# Patient Record
Sex: MALE | Race: WHITE | Employment: OTHER | URBAN - METROPOLITAN AREA
[De-identification: names, ages, dates, MRNs, and addresses within clinical notes are randomized per-mention and may not be internally consistent; named-entity substitution may affect disease eponyms.]

---

## 2017-03-09 ENCOUNTER — OFFICE VISIT (OUTPATIENT)
Dept: FAMILY MEDICINE CLINIC | Age: 37
End: 2017-03-09

## 2017-03-09 VITALS
BODY MASS INDEX: 38.98 KG/M2 | SYSTOLIC BLOOD PRESSURE: 108 MMHG | RESPIRATION RATE: 16 BRPM | DIASTOLIC BLOOD PRESSURE: 70 MMHG | WEIGHT: 278.4 LBS | HEIGHT: 71 IN | HEART RATE: 68 BPM | OXYGEN SATURATION: 98 %

## 2017-03-09 DIAGNOSIS — M25.461 KNEE EFFUSION, RIGHT: ICD-10-CM

## 2017-03-09 DIAGNOSIS — M25.561 ACUTE PAIN OF RIGHT KNEE: Primary | ICD-10-CM

## 2017-03-09 DIAGNOSIS — G44.209 TENSION HEADACHE: ICD-10-CM

## 2017-03-09 PROCEDURE — 99213 OFFICE O/P EST LOW 20 MIN: CPT | Performed by: FAMILY MEDICINE

## 2017-03-09 RX ORDER — MELOXICAM 15 MG/1
15 TABLET ORAL DAILY
Qty: 30 TABLET | Refills: 2 | Status: SHIPPED | OUTPATIENT
Start: 2017-03-09 | End: 2017-12-12 | Stop reason: ALTCHOICE

## 2017-03-09 ASSESSMENT — PATIENT HEALTH QUESTIONNAIRE - PHQ9
SUM OF ALL RESPONSES TO PHQ9 QUESTIONS 1 & 2: 0
2. FEELING DOWN, DEPRESSED OR HOPELESS: 0
SUM OF ALL RESPONSES TO PHQ QUESTIONS 1-9: 0
1. LITTLE INTEREST OR PLEASURE IN DOING THINGS: 0

## 2017-03-09 ASSESSMENT — ENCOUNTER SYMPTOMS
GASTROINTESTINAL NEGATIVE: 1
RESPIRATORY NEGATIVE: 1

## 2017-04-26 ENCOUNTER — TELEPHONE (OUTPATIENT)
Dept: CARDIOLOGY | Age: 37
End: 2017-04-26

## 2017-10-26 ENCOUNTER — OFFICE VISIT (OUTPATIENT)
Dept: PULMONOLOGY | Age: 37
End: 2017-10-26
Payer: COMMERCIAL

## 2017-10-26 VITALS
HEART RATE: 89 BPM | OXYGEN SATURATION: 97 % | DIASTOLIC BLOOD PRESSURE: 76 MMHG | BODY MASS INDEX: 38.16 KG/M2 | WEIGHT: 272.6 LBS | SYSTOLIC BLOOD PRESSURE: 112 MMHG | HEIGHT: 71 IN

## 2017-10-26 DIAGNOSIS — G47.33 OBSTRUCTIVE SLEEP APNEA: Primary | ICD-10-CM

## 2017-10-26 PROCEDURE — G8484 FLU IMMUNIZE NO ADMIN: HCPCS | Performed by: PHYSICIAN ASSISTANT

## 2017-10-26 PROCEDURE — 1036F TOBACCO NON-USER: CPT | Performed by: PHYSICIAN ASSISTANT

## 2017-10-26 PROCEDURE — G8417 CALC BMI ABV UP PARAM F/U: HCPCS | Performed by: PHYSICIAN ASSISTANT

## 2017-10-26 PROCEDURE — G8427 DOCREV CUR MEDS BY ELIG CLIN: HCPCS | Performed by: PHYSICIAN ASSISTANT

## 2017-10-26 PROCEDURE — 99213 OFFICE O/P EST LOW 20 MIN: CPT | Performed by: PHYSICIAN ASSISTANT

## 2017-12-12 ENCOUNTER — OFFICE VISIT (OUTPATIENT)
Dept: FAMILY MEDICINE CLINIC | Age: 37
End: 2017-12-12
Payer: COMMERCIAL

## 2017-12-12 VITALS
WEIGHT: 274.8 LBS | HEIGHT: 70 IN | SYSTOLIC BLOOD PRESSURE: 136 MMHG | RESPIRATION RATE: 16 BRPM | DIASTOLIC BLOOD PRESSURE: 88 MMHG | HEART RATE: 76 BPM | BODY MASS INDEX: 39.34 KG/M2

## 2017-12-12 DIAGNOSIS — M25.461 EFFUSION OF RIGHT KNEE: ICD-10-CM

## 2017-12-12 DIAGNOSIS — M23.91 INTERNAL DERANGEMENT OF RIGHT KNEE: ICD-10-CM

## 2017-12-12 DIAGNOSIS — M25.561 ACUTE PAIN OF RIGHT KNEE: Primary | ICD-10-CM

## 2017-12-12 PROCEDURE — G8427 DOCREV CUR MEDS BY ELIG CLIN: HCPCS | Performed by: FAMILY MEDICINE

## 2017-12-12 PROCEDURE — G8417 CALC BMI ABV UP PARAM F/U: HCPCS | Performed by: FAMILY MEDICINE

## 2017-12-12 PROCEDURE — 99213 OFFICE O/P EST LOW 20 MIN: CPT | Performed by: FAMILY MEDICINE

## 2017-12-12 PROCEDURE — 4004F PT TOBACCO SCREEN RCVD TLK: CPT | Performed by: FAMILY MEDICINE

## 2017-12-12 PROCEDURE — G8484 FLU IMMUNIZE NO ADMIN: HCPCS | Performed by: FAMILY MEDICINE

## 2017-12-12 RX ORDER — MELOXICAM 15 MG/1
15 TABLET ORAL DAILY
Qty: 30 TABLET | Refills: 2 | Status: SHIPPED | OUTPATIENT
Start: 2017-12-12 | End: 2018-01-10 | Stop reason: ALTCHOICE

## 2017-12-12 ASSESSMENT — ENCOUNTER SYMPTOMS
GASTROINTESTINAL NEGATIVE: 1
BACK PAIN: 1
RESPIRATORY NEGATIVE: 1

## 2017-12-12 NOTE — PROGRESS NOTES
Visit Information    Have you changed or started any medications since your last visit including any over-the-counter medicines, vitamins, or herbal medicines? no   Are you having any side effects from any of your medications? -  no  Have you stopped taking any of your medications? Is so, why? -  yes - tx complete    Have you seen any other physician or provider since your last visit? Yes, records obtained  Have you had any other diagnostic tests since your last visit? No  Have you been seen in the emergency room and/or had an admission to a hospital since we last saw you? No  Have you had your routine dental cleaning in the past 6 months? no    Have you activated your [employer] account? If not, what are your barriers?  Yes     Patient Care Team:  Héctor Sage DO as PCP - General (Family Medicine)    Medical History Review  Past Medical, Family, and Social History reviewed and does not contribute to the patient presenting condition    Health Maintenance   Topic Date Due    HIV screen  01/08/1994    DTaP/Tdap/Td vaccine (1 - Tdap) 01/08/1998    Flu vaccine (1) 09/01/2017

## 2017-12-12 NOTE — PROGRESS NOTES
Subjective:      Patient ID: Karlee Seo is a 44 y.o. male. HPI:    Chief Complaint   Patient presents with    Knee Pain     right knee pain, got worse the past 2wks     Pt here for follow up right knee pain. Seems to be getting worse over the last 2 weeks. Very swollen. Unable to walk due to the pain and swelling. Unable to flex the knee. Patient Active Problem List   Diagnosis    Tobacco abuse    Dyslipidemia    Nasal obstruction    Deviated nasal septum    Chronic maxillary sinusitis    Hypertrophy of both inferior nasal turbinates    Excessive cerumen in both ear canals    Shoulder pain    Right shoulder tendonitis    Obstructive sleep apnea    Obesity (BMI 30-39. 9)    Restless sleeper    Claustrophobia    Daytime somnolence    Atypical chest pain    Anxiety    IFG (impaired fasting glucose)    Metabolic syndrome     Past Surgical History:   Procedure Laterality Date    APPENDECTOMY  1996    SINUS SURGERY  07/16/2014    RT. MAXILLARY ANTROSTOMY WITH TISSUE REMOVAL SEPTOPLASTY TURBINATE REDUCTION EXCISION AND BIOPSLY LESION MOUTH      Prior to Admission medications    Medication Sig Start Date End Date Taking? Authorizing Provider   CPAP Machine MISC by Does not apply route Please change CPAP pressure to 10 cm H20. 10/26/17  Yes Negin Cr PA-C         Review of Systems   Constitutional: Negative. HENT: Negative. Respiratory: Negative. Cardiovascular: Negative. Gastrointestinal: Negative. Musculoskeletal: Positive for arthralgias (right knee pain), back pain and joint swelling. All other systems reviewed and are negative. Objective:   Physical Exam   Constitutional: He is oriented to person, place, and time. He appears well-developed and well-nourished. HENT:   Head: Normocephalic and atraumatic.    Right Ear: Tympanic membrane normal.   Left Ear: Tympanic membrane normal.   Mouth/Throat: Oropharynx is clear and moist and mucous membranes are normal.   Cardiovascular: Normal rate, regular rhythm and normal heart sounds. No murmur heard. Pulmonary/Chest: Effort normal and breath sounds normal.   Abdominal: Soft. Bowel sounds are normal.   Musculoskeletal: He exhibits no edema. Right knee: He exhibits decreased range of motion, swelling, effusion and abnormal meniscus. Tenderness found. Medial joint line tenderness noted. Neurological: He is alert and oriented to person, place, and time. Skin: Skin is warm and dry. Psychiatric: He has a normal mood and affect. His behavior is normal.   Nursing note and vitals reviewed. Assessment:      1. Acute pain of right knee  MRI LOWER EXTREMITY RIGHT W JT WO CONTRAST   2. Effusion of right knee  MRI LOWER EXTREMITY RIGHT W JT WO CONTRAST    meloxicam (MOBIC) 15 MG tablet   3.  Internal derangement of right knee  MRI LOWER EXTREMITY RIGHT W JT WO CONTRAST           Plan:      -  Continue Mobic  -  Order MRI, internal derangement likely  -  RTO prn for now, further recs after above

## 2017-12-21 ENCOUNTER — HOSPITAL ENCOUNTER (OUTPATIENT)
Dept: MRI IMAGING | Age: 37
Discharge: HOME | End: 2017-12-21
Payer: COMMERCIAL

## 2017-12-21 DIAGNOSIS — M94.261 CHONDROMALACIA, RIGHT KNEE: ICD-10-CM

## 2017-12-21 DIAGNOSIS — M25.561 ACUTE PAIN OF RIGHT KNEE: ICD-10-CM

## 2017-12-21 DIAGNOSIS — M25.561 ACUTE PAIN OF RIGHT KNEE: Primary | ICD-10-CM

## 2017-12-21 DIAGNOSIS — M23.91 INTERNAL DERANGEMENT OF RIGHT KNEE: ICD-10-CM

## 2017-12-21 DIAGNOSIS — M25.461 EFFUSION OF RIGHT KNEE: ICD-10-CM

## 2017-12-21 PROCEDURE — 73721 MRI JNT OF LWR EXTRE W/O DYE: CPT

## 2018-01-04 ENCOUNTER — TELEPHONE (OUTPATIENT)
Dept: FAMILY MEDICINE CLINIC | Age: 38
End: 2018-01-04

## 2018-01-04 NOTE — TELEPHONE ENCOUNTER
Pt was notified, he is getting the testing done tomorrow and appt for his pre op was scheduled next week.

## 2018-01-04 NOTE — TELEPHONE ENCOUNTER
Per pt was sent by Dr. Jose Dolan to [facility] for his knee. They are going to be doing arthroscopic knee surgery on him 1/17/2018. Pt. Is wondering does he have to come back in to get cleared or is the appt from 12/12/2017 okay for him to be cleared.

## 2018-01-10 ENCOUNTER — OFFICE VISIT (OUTPATIENT)
Dept: FAMILY MEDICINE CLINIC | Age: 38
End: 2018-01-10

## 2018-01-10 VITALS
WEIGHT: 276.6 LBS | DIASTOLIC BLOOD PRESSURE: 78 MMHG | SYSTOLIC BLOOD PRESSURE: 126 MMHG | HEART RATE: 76 BPM | HEIGHT: 70 IN | RESPIRATION RATE: 16 BRPM | BODY MASS INDEX: 39.6 KG/M2

## 2018-01-10 DIAGNOSIS — M25.561 RIGHT KNEE PAIN, UNSPECIFIED CHRONICITY: ICD-10-CM

## 2018-01-10 DIAGNOSIS — Z72.0 TOBACCO ABUSE: ICD-10-CM

## 2018-01-10 DIAGNOSIS — E78.5 DYSLIPIDEMIA: ICD-10-CM

## 2018-01-10 DIAGNOSIS — G47.33 OBSTRUCTIVE SLEEP APNEA: ICD-10-CM

## 2018-01-10 DIAGNOSIS — E66.9 OBESITY (BMI 30-39.9): ICD-10-CM

## 2018-01-10 DIAGNOSIS — M25.461 EFFUSION OF KNEE JOINT RIGHT: ICD-10-CM

## 2018-01-10 DIAGNOSIS — R73.01 IFG (IMPAIRED FASTING GLUCOSE): ICD-10-CM

## 2018-01-10 DIAGNOSIS — Z01.818 PRE-OP EVALUATION: Primary | ICD-10-CM

## 2018-01-10 PROCEDURE — PREOPEXAM PRE-OP EXAM: Performed by: FAMILY MEDICINE

## 2018-01-10 ASSESSMENT — ENCOUNTER SYMPTOMS
RESPIRATORY NEGATIVE: 1
GASTROINTESTINAL NEGATIVE: 1

## 2018-01-10 NOTE — PROGRESS NOTES
Visit Information    Have you changed or started any medications since your last visit including any over-the-counter medicines, vitamins, or herbal medicines? no   Are you having any side effects from any of your medications? -  no  Have you stopped taking any of your medications? Is so, why? -  no    Have you seen any other physician or provider since your last visit? Yes - Records Requested Dr Estelle Chapa  Have you had any other diagnostic tests since your last visit? Yes - Records Requested  Have you been seen in the emergency room and/or had an admission to a hospital since we last saw you? No  Have you had your routine dental cleaning in the past 6 months? no    Have you activated your [employer] account? If not, what are your barriers?  Yes     Patient Care Team:  Madisyn Bahena DO as PCP - General (Family Medicine)  Lorenza Angelucci, MD as Consulting Physician (Orthopedic Surgery)    Medical History Review  Past Medical, Family, and Social History reviewed and does contribute to the patient presenting condition    Health Maintenance   Topic Date Due    HIV screen  01/08/1994    DTaP/Tdap/Td vaccine (1 - Tdap) 01/08/1998    Pneumococcal med risk (1 of 1 - PPSV23) 01/08/1998    A1C test (Diabetic or Prediabetic)  09/28/2017    Flu vaccine (1) 09/01/2018 (Originally 9/1/2017)

## 2018-04-22 ENCOUNTER — HOSPITAL ENCOUNTER (EMERGENCY)
Age: 38
Discharge: HOME | End: 2018-04-23
Payer: COMMERCIAL

## 2018-04-22 DIAGNOSIS — S05.02XA ABRASION OF LEFT CORNEA, INITIAL ENCOUNTER: Primary | ICD-10-CM

## 2018-04-22 PROCEDURE — 99283 EMERGENCY DEPT VISIT LOW MDM: CPT

## 2018-04-22 RX ORDER — PROPARACAINE HYDROCHLORIDE 5 MG/ML
2 SOLUTION/ DROPS OPHTHALMIC ONCE
Status: COMPLETED | OUTPATIENT
Start: 2018-04-23 | End: 2018-04-23

## 2018-04-22 ASSESSMENT — VISUAL ACUITY
OU: 20 25
OD: 20 40
OS: 20 30

## 2018-04-22 ASSESSMENT — PAIN DESCRIPTION - ORIENTATION: ORIENTATION: LEFT

## 2018-04-22 ASSESSMENT — PAIN SCALES - GENERAL: PAINLEVEL_OUTOF10: 9

## 2018-04-22 ASSESSMENT — PAIN DESCRIPTION - LOCATION: LOCATION: EYE

## 2018-04-22 ASSESSMENT — PAIN DESCRIPTION - DESCRIPTORS: DESCRIPTORS: ACHING

## 2018-04-23 VITALS
OXYGEN SATURATION: 97 % | RESPIRATION RATE: 18 BRPM | HEART RATE: 71 BPM | DIASTOLIC BLOOD PRESSURE: 78 MMHG | TEMPERATURE: 97.8 F | SYSTOLIC BLOOD PRESSURE: 146 MMHG

## 2018-04-23 PROCEDURE — 6370000000 HC RX 637 (ALT 250 FOR IP): Performed by: NURSE PRACTITIONER

## 2018-04-23 RX ORDER — POLYMYXIN B SULFATE AND TRIMETHOPRIM 1; 10000 MG/ML; [USP'U]/ML
2 SOLUTION OPHTHALMIC ONCE
Status: COMPLETED | OUTPATIENT
Start: 2018-04-23 | End: 2018-04-23

## 2018-04-23 RX ADMIN — POLYMYXIN B SULFATE AND TRIMETHOPRIM SULFATE 2 DROP: 10000; 1 SOLUTION/ DROPS OPHTHALMIC at 01:59

## 2018-04-23 RX ADMIN — PROPARACAINE HYDROCHLORIDE 2 DROP: 5 SOLUTION/ DROPS OPHTHALMIC at 01:00

## 2018-04-23 ASSESSMENT — ENCOUNTER SYMPTOMS
ABDOMINAL PAIN: 0
EYE REDNESS: 0
DIARRHEA: 0
BACK PAIN: 0
NAUSEA: 0
VOMITING: 0
COUGH: 0
WHEEZING: 0
SORE THROAT: 0
SHORTNESS OF BREATH: 0
EYE PAIN: 1
EYE DISCHARGE: 0
RHINORRHEA: 0

## 2018-07-15 ENCOUNTER — HOSPITAL ENCOUNTER (EMERGENCY)
Age: 38
Discharge: HOME | End: 2018-07-15
Payer: COMMERCIAL

## 2018-07-15 VITALS
WEIGHT: 277 LBS | HEART RATE: 100 BPM | SYSTOLIC BLOOD PRESSURE: 124 MMHG | DIASTOLIC BLOOD PRESSURE: 78 MMHG | OXYGEN SATURATION: 96 % | HEIGHT: 70 IN | BODY MASS INDEX: 39.65 KG/M2 | RESPIRATION RATE: 16 BRPM | TEMPERATURE: 99.2 F

## 2018-07-15 DIAGNOSIS — H60.392 INFECTIVE OTITIS EXTERNA OF LEFT EAR: Primary | ICD-10-CM

## 2018-07-15 DIAGNOSIS — S53.402A ELBOW SPRAIN, LEFT, INITIAL ENCOUNTER: ICD-10-CM

## 2018-07-15 DIAGNOSIS — S30.0XXA CONTUSION OF BUTTOCK, INITIAL ENCOUNTER: ICD-10-CM

## 2018-07-15 PROCEDURE — 99212 OFFICE O/P EST SF 10 MIN: CPT

## 2018-07-15 PROCEDURE — 99213 OFFICE O/P EST LOW 20 MIN: CPT | Performed by: NURSE PRACTITIONER

## 2018-07-15 PROCEDURE — 6360000002 HC RX W HCPCS: Performed by: NURSE PRACTITIONER

## 2018-07-15 PROCEDURE — 96372 THER/PROPH/DIAG INJ SC/IM: CPT

## 2018-07-15 RX ORDER — OFLOXACIN 3 MG/ML
5 SOLUTION AURICULAR (OTIC) 2 TIMES DAILY
Qty: 10 ML | Refills: 0 | Status: SHIPPED | OUTPATIENT
Start: 2018-07-15 | End: 2018-07-16 | Stop reason: ALTCHOICE

## 2018-07-15 RX ORDER — OFLOXACIN 3 MG/ML
5 SOLUTION AURICULAR (OTIC) 2 TIMES DAILY
Qty: 10 ML | Refills: 0 | Status: SHIPPED | OUTPATIENT
Start: 2018-07-15 | End: 2018-07-15

## 2018-07-15 RX ORDER — METHYLPREDNISOLONE ACETATE 80 MG/ML
80 INJECTION, SUSPENSION INTRA-ARTICULAR; INTRALESIONAL; INTRAMUSCULAR; SOFT TISSUE ONCE
Status: COMPLETED | OUTPATIENT
Start: 2018-07-15 | End: 2018-07-15

## 2018-07-15 RX ORDER — IBUPROFEN 800 MG/1
800 TABLET ORAL EVERY 6 HOURS PRN
COMMUNITY
End: 2018-10-31 | Stop reason: ALTCHOICE

## 2018-07-15 RX ADMIN — METHYLPREDNISOLONE ACETATE 80 MG: 80 INJECTION, SUSPENSION INTRA-ARTICULAR; INTRALESIONAL; INTRAMUSCULAR; SOFT TISSUE at 16:55

## 2018-07-15 ASSESSMENT — ENCOUNTER SYMPTOMS
SORE THROAT: 0
NAUSEA: 0
VOMITING: 0
SHORTNESS OF BREATH: 0
DIARRHEA: 0
COUGH: 0

## 2018-07-15 ASSESSMENT — PAIN DESCRIPTION - DESCRIPTORS: DESCRIPTORS: DISCOMFORT;ACHING

## 2018-07-15 ASSESSMENT — PAIN DESCRIPTION - PAIN TYPE: TYPE: ACUTE PAIN

## 2018-07-15 ASSESSMENT — PAIN SCALES - GENERAL: PAINLEVEL_OUTOF10: 8

## 2018-07-15 ASSESSMENT — PAIN DESCRIPTION - FREQUENCY: FREQUENCY: CONTINUOUS

## 2018-07-15 ASSESSMENT — PAIN DESCRIPTION - ORIENTATION: ORIENTATION: LEFT

## 2018-07-15 NOTE — ED PROVIDER NOTES
[id number]  Urgent Care Encounter       CHIEF COMPLAINT       Chief Complaint   Patient presents with   James Botello     left ear    Elbow Pain     left from a fall    Buttocks Pain     left from a fall       Nurses Notes reviewed and I agree except as noted in the HPI. HISTORY OF PRESENT ILLNESS   Arabella Hoang is a 43 y.o. male who presents With complaints of left ear pain. Also complains of left elbow and left buttock pain from a fall over the weekend. Patient reports intermittent left ear pain for the past 2 weeks however, pain increased yesterday. He also reports not being able to hear out of the left ear as well. He does report temperature of 102°F today. He did take some Tylenol and the fever was down to 99.7°F at this visit. No reports of nausea, vomiting, diarrhea. Left elbow and left buttock pain are result of a fall while hunting over the weekend. He slipped while walking down a steep incline and braced himself with his left arm and also landed on a rock with his left buttock. The ports a large bruise to the left buttock. States he elbow is feeling better. The history is provided by the patient. No  was used. REVIEW OF SYSTEMS     Review of Systems   Constitutional: Positive for fever. Negative for appetite change, chills and fatigue. HENT: Positive for ear pain (Left). Negative for congestion and sore throat. Respiratory: Negative for cough and shortness of breath. Cardiovascular: Negative for chest pain. Gastrointestinal: Negative for diarrhea, nausea and vomiting. Musculoskeletal:        See HPI   Neurological: Negative for dizziness and headaches.        PAST MEDICAL HISTORY         Diagnosis Date    Anxiety     Asthma     as child    Depression     Hyperlipidemia     Metabolic syndrome 62/48/4854    Obesity     Sleep apnea     Has CPAP       SURGICAL HISTORY     Patient  has a past surgical history that includes Appendectomy (1996) and sinus surgery (07/16/2014). CURRENT MEDICATIONS       Discharge Medication List as of 7/15/2018  4:53 PM      CONTINUE these medications which have NOT CHANGED    Details   Pseudoephedrine-APAP-DM (DAYQUIL PO) Take by mouthHistorical Med      ibuprofen (ADVIL;MOTRIN) 800 MG tablet Take 800 mg by mouth every 6 hours as needed for PainHistorical Med      CPAP Machine MISC Starting Thu 10/26/2017, Disp-1 each, R-0, PrintPlease change CPAP pressure to 10 cm H20. ALLERGIES     Patient is has No Known Allergies. Patients   There is no immunization history on file for this patient. FAMILY HISTORY     Patient's family history includes Arthritis in his maternal grandfather, maternal grandmother, paternal grandfather, and paternal grandmother; Cancer in his father; Diabetes in his maternal grandfather, maternal grandmother, paternal grandfather, and paternal grandmother; Heart Disease in his maternal grandfather; Kidney Disease in his maternal grandfather; Stroke in his paternal grandmother. SOCIAL HISTORY     Patient  reports that he has been smoking Cigarettes. He started smoking about 22 years ago. He has a 20.00 pack-year smoking history. He has never used smokeless tobacco. He reports that he does not drink alcohol or use drugs. PHYSICAL EXAM     ED TRIAGE VITALS  BP: 124/78, Temp: 99.2 °F (37.3 °C), Pulse: 100, Resp: 16, SpO2: 96 %,Estimated body mass index is 39.75 kg/m² as calculated from the following:    Height as of this encounter: 5' 10\" (1.778 m). Weight as of this encounter: 277 lb (125.6 kg). ,No LMP for male patient. Physical Exam   Constitutional: He is oriented to person, place, and time. He appears well-developed and well-nourished. He is cooperative. He does not appear ill. He appears distressed (Mild). HENT:   Head: Normocephalic and atraumatic.    Right Ear: Hearing, tympanic membrane, external ear and ear canal normal.   Left Ear: Decreased hearing is noted. Left ear canal is almost completely swollen shut. Pain with ear movement tragus movement. Pulmonary/Chest: Effort normal. No respiratory distress. Musculoskeletal:        Legs:  Neurological: He is alert and oriented to person, place, and time. Skin: Skin is warm and dry. Psychiatric: He has a normal mood and affect. His speech is normal and behavior is normal. Thought content normal.   Nursing note and vitals reviewed. DIAGNOSTIC RESULTS   Labs:No results found for this visit on 07/15/18. IMAGING:    No orders to display         URGENT CARE COURSE:     Vitals:    07/15/18 1624 07/15/18 1712   BP: (!) 148/93 124/78   Pulse: 104 100   Resp: 18 16   Temp: 99.7 °F (37.6 °C) 99.2 °F (37.3 °C)   TempSrc: Oral Oral   SpO2: 96%    Weight: 277 lb (125.6 kg)    Height: 5' 10\" (1.778 m)        Medications   methylPREDNISolone acetate (DEPO-MEDROL) injection 80 mg (80 mg Intramuscular Given 7/15/18 1655)            PROCEDURES:  None    FINAL IMPRESSION      1. Infective otitis externa of left ear    2. Elbow sprain, left, initial encounter    3. Contusion of buttock, initial encounter          DISPOSITION/PLAN   DISPOSITION Decision To Discharge 07/15/2018 04:46:47 PM  Use eardrops as prescribed for the full 7 days.  Do not stop using even if symptoms have resolved. Keep ears clean and dry. No swimming or submerging head underwater for 7 days. If going to be around water, use the pliable earplugs. Follow-up with her family doctor as needed if symptoms do not resolve. Go to the emergency room for any concerns, fever greater than 102°F.  Patient with left otitis externa. He was given 80 mg of Depo-Medrol IM while in the urgent care center due to the edema of the ear canal.  He was prescribed ofloxacin drops to use ×7 days. Not concerned with the elbow and buttock injury. He can use ice to the buttock as desired. Left elbow with no swelling or decreased range of motion.     PATIENT

## 2018-07-16 ENCOUNTER — HOSPITAL ENCOUNTER (EMERGENCY)
Age: 38
Discharge: HOME | End: 2018-07-16
Payer: COMMERCIAL

## 2018-07-16 ENCOUNTER — OFFICE VISIT (OUTPATIENT)
Dept: ENT CLINIC | Age: 38
End: 2018-07-16
Payer: COMMERCIAL

## 2018-07-16 ENCOUNTER — APPOINTMENT (OUTPATIENT)
Dept: CT IMAGING | Age: 38
End: 2018-07-16
Payer: COMMERCIAL

## 2018-07-16 VITALS
WEIGHT: 277 LBS | HEIGHT: 70 IN | BODY MASS INDEX: 39.65 KG/M2 | SYSTOLIC BLOOD PRESSURE: 136 MMHG | RESPIRATION RATE: 18 BRPM | DIASTOLIC BLOOD PRESSURE: 92 MMHG | OXYGEN SATURATION: 96 % | TEMPERATURE: 98 F | HEART RATE: 85 BPM

## 2018-07-16 VITALS
WEIGHT: 279.9 LBS | TEMPERATURE: 98.2 F | SYSTOLIC BLOOD PRESSURE: 130 MMHG | BODY MASS INDEX: 40.07 KG/M2 | DIASTOLIC BLOOD PRESSURE: 84 MMHG | HEIGHT: 70 IN | HEART RATE: 96 BPM | RESPIRATION RATE: 16 BRPM

## 2018-07-16 DIAGNOSIS — H60.312 ACUTE DIFFUSE OTITIS EXTERNA OF LEFT EAR: Primary | ICD-10-CM

## 2018-07-16 DIAGNOSIS — H60.392 INFECTIVE OTITIS EXTERNA OF LEFT EAR: Primary | ICD-10-CM

## 2018-07-16 LAB
ANION GAP SERPL CALCULATED.3IONS-SCNC: 14 MEQ/L (ref 8–16)
BASOPHILS # BLD: 0.4 %
BASOPHILS ABSOLUTE: 0.1 THOU/MM3 (ref 0–0.1)
BUN BLDV-MCNC: 9 MG/DL (ref 7–22)
CALCIUM SERPL-MCNC: 9.4 MG/DL (ref 8.5–10.5)
CHLORIDE BLD-SCNC: 103 MEQ/L (ref 98–111)
CO2: 23 MEQ/L (ref 23–33)
CREAT SERPL-MCNC: 0.7 MG/DL (ref 0.4–1.2)
EOSINOPHIL # BLD: 1.3 %
EOSINOPHILS ABSOLUTE: 0.2 THOU/MM3 (ref 0–0.4)
ERYTHROCYTE [DISTWIDTH] IN BLOOD BY AUTOMATED COUNT: 13.7 % (ref 11.5–14.5)
ERYTHROCYTE [DISTWIDTH] IN BLOOD BY AUTOMATED COUNT: 46.5 FL (ref 35–45)
GFR SERPL CREATININE-BSD FRML MDRD: > 90 ML/MIN/1.73M2
GLUCOSE BLD-MCNC: 115 MG/DL (ref 70–108)
HCT VFR BLD CALC: 44 % (ref 42–52)
HEMOGLOBIN: 15.2 GM/DL (ref 14–18)
IMMATURE GRANS (ABS): 0.1 THOU/MM3 (ref 0–0.07)
IMMATURE GRANULOCYTES: 0.7 %
LYMPHOCYTES # BLD: 12.8 %
LYMPHOCYTES ABSOLUTE: 1.8 THOU/MM3 (ref 1–4.8)
MCH RBC QN AUTO: 31.9 PG (ref 26–33)
MCHC RBC AUTO-ENTMCNC: 34.5 GM/DL (ref 32.2–35.5)
MCV RBC AUTO: 92.4 FL (ref 80–94)
MONOCYTES # BLD: 9.9 %
MONOCYTES ABSOLUTE: 1.4 THOU/MM3 (ref 0.4–1.3)
NUCLEATED RED BLOOD CELLS: 0 /100 WBC
OSMOLALITY CALCULATION: 279 MOSMOL/KG (ref 275–300)
PLATELET # BLD: 226 THOU/MM3 (ref 130–400)
PMV BLD AUTO: 9.6 FL (ref 9.4–12.4)
POTASSIUM SERPL-SCNC: 4.1 MEQ/L (ref 3.5–5.2)
RBC # BLD: 4.76 MILL/MM3 (ref 4.7–6.1)
SEG NEUTROPHILS: 74.9 %
SEGMENTED NEUTROPHILS ABSOLUTE COUNT: 10.3 THOU/MM3 (ref 1.8–7.7)
SODIUM BLD-SCNC: 140 MEQ/L (ref 135–145)
WBC # BLD: 13.7 THOU/MM3 (ref 4.8–10.8)

## 2018-07-16 PROCEDURE — 87075 CULTR BACTERIA EXCEPT BLOOD: CPT

## 2018-07-16 PROCEDURE — 87205 SMEAR GRAM STAIN: CPT

## 2018-07-16 PROCEDURE — 4004F PT TOBACCO SCREEN RCVD TLK: CPT | Performed by: OTOLARYNGOLOGY

## 2018-07-16 PROCEDURE — G8427 DOCREV CUR MEDS BY ELIG CLIN: HCPCS | Performed by: OTOLARYNGOLOGY

## 2018-07-16 PROCEDURE — 6370000000 HC RX 637 (ALT 250 FOR IP): Performed by: EMERGENCY MEDICINE

## 2018-07-16 PROCEDURE — 87186 SC STD MICRODIL/AGAR DIL: CPT

## 2018-07-16 PROCEDURE — 36415 COLL VENOUS BLD VENIPUNCTURE: CPT

## 2018-07-16 PROCEDURE — 4130F TOPICAL PREP RX AOE: CPT | Performed by: OTOLARYNGOLOGY

## 2018-07-16 PROCEDURE — 80048 BASIC METABOLIC PNL TOTAL CA: CPT

## 2018-07-16 PROCEDURE — G8417 CALC BMI ABV UP PARAM F/U: HCPCS | Performed by: OTOLARYNGOLOGY

## 2018-07-16 PROCEDURE — 99203 OFFICE O/P NEW LOW 30 MIN: CPT | Performed by: OTOLARYNGOLOGY

## 2018-07-16 PROCEDURE — 85025 COMPLETE CBC W/AUTO DIFF WBC: CPT

## 2018-07-16 PROCEDURE — 87070 CULTURE OTHR SPECIMN AEROBIC: CPT

## 2018-07-16 PROCEDURE — 70480 CT ORBIT/EAR/FOSSA W/O DYE: CPT

## 2018-07-16 PROCEDURE — 99283 EMERGENCY DEPT VISIT LOW MDM: CPT

## 2018-07-16 RX ORDER — HYDROCODONE BITARTRATE AND ACETAMINOPHEN 5; 325 MG/1; MG/1
1 TABLET ORAL ONCE
Status: COMPLETED | OUTPATIENT
Start: 2018-07-16 | End: 2018-07-16

## 2018-07-16 RX ORDER — DEXAMETHASONE SODIUM PHOSPHATE 1 MG/ML
2 SOLUTION/ DROPS OPHTHALMIC ONCE
Status: DISCONTINUED | OUTPATIENT
Start: 2018-07-16 | End: 2018-07-16 | Stop reason: HOSPADM

## 2018-07-16 RX ORDER — CIPROFLOXACIN HYDROCHLORIDE 3.5 MG/ML
2 SOLUTION/ DROPS TOPICAL ONCE
Status: COMPLETED | OUTPATIENT
Start: 2018-07-16 | End: 2018-07-16

## 2018-07-16 RX ORDER — CIPROFLOXACIN AND DEXAMETHASONE 3; 1 MG/ML; MG/ML
4 SUSPENSION/ DROPS AURICULAR (OTIC) 2 TIMES DAILY
Qty: 1 BOTTLE | Refills: 0 | Status: SHIPPED | OUTPATIENT
Start: 2018-07-16 | End: 2018-07-23

## 2018-07-16 RX ORDER — NAPROXEN 500 MG/1
500 TABLET ORAL 2 TIMES DAILY
Qty: 60 TABLET | Refills: 0 | Status: SHIPPED | OUTPATIENT
Start: 2018-07-16 | End: 2018-10-31 | Stop reason: ALTCHOICE

## 2018-07-16 RX ORDER — TRAMADOL HYDROCHLORIDE 50 MG/1
50 TABLET ORAL EVERY 6 HOURS PRN
Qty: 12 TABLET | Refills: 0 | Status: SHIPPED | OUTPATIENT
Start: 2018-07-16 | End: 2018-07-19

## 2018-07-16 RX ADMIN — CIPROFLOXACIN HYDROCHLORIDE 2 DROP: 3 SOLUTION/ DROPS OPHTHALMIC at 09:00

## 2018-07-16 RX ADMIN — HYDROCODONE BITARTRATE AND ACETAMINOPHEN 1 TABLET: 5; 325 TABLET ORAL at 07:11

## 2018-07-16 ASSESSMENT — ENCOUNTER SYMPTOMS
WHEEZING: 0
NAUSEA: 0
COLOR CHANGE: 0
TROUBLE SWALLOWING: 0
TROUBLE SWALLOWING: 0
VOMITING: 0
VOICE CHANGE: 0
FACIAL SWELLING: 1
CHEST TIGHTNESS: 0
SHORTNESS OF BREATH: 0
ABDOMINAL PAIN: 0
BLOOD IN STOOL: 0
SINUS PAIN: 0
CONSTIPATION: 0
CHOKING: 0
SORE THROAT: 0
EYE REDNESS: 0
STRIDOR: 0
COLOR CHANGE: 0
RHINORRHEA: 0
EYE DISCHARGE: 0
FACIAL SWELLING: 1
EYE ITCHING: 0
SHORTNESS OF BREATH: 0
COUGH: 0
SORE THROAT: 0
SINUS PRESSURE: 0
ANAL BLEEDING: 0
ABDOMINAL DISTENTION: 0
PHOTOPHOBIA: 0
BACK PAIN: 0
VOICE CHANGE: 0
DIARRHEA: 0
SINUS PRESSURE: 0
COUGH: 0
EYE PAIN: 0
APNEA: 0
RECTAL PAIN: 0
ABDOMINAL PAIN: 0

## 2018-07-16 ASSESSMENT — PAIN DESCRIPTION - FREQUENCY: FREQUENCY: CONTINUOUS

## 2018-07-16 ASSESSMENT — PAIN DESCRIPTION - PAIN TYPE: TYPE: ACUTE PAIN

## 2018-07-16 ASSESSMENT — PAIN DESCRIPTION - PROGRESSION: CLINICAL_PROGRESSION: GRADUALLY WORSENING

## 2018-07-16 ASSESSMENT — PAIN DESCRIPTION - ONSET: ONSET: ON-GOING

## 2018-07-16 ASSESSMENT — PAIN SCALES - GENERAL
PAINLEVEL_OUTOF10: 8
PAINLEVEL_OUTOF10: 6
PAINLEVEL_OUTOF10: 10

## 2018-07-16 ASSESSMENT — PAIN DESCRIPTION - LOCATION: LOCATION: EAR

## 2018-07-16 ASSESSMENT — PAIN DESCRIPTION - ORIENTATION: ORIENTATION: LEFT

## 2018-07-16 NOTE — PROGRESS NOTES
Subjective:      Patient ID: Concetta Vitale is a 43 y.o. male. Patient is here for otitis externa, er per Crystal    HPI: Acute onset ear pain left SAturday. Seen in Urgent Care, placed on ear dops and a shot of steroids. Said drops were not going in. Pain worse. Went to ER. Had cultures done and they put in an ear wick. Two weeks ago, left ear was itchy. He stuck a Qtip in the ear and got blood on the QTip. Review of Systems   Constitutional: Negative for activity change, appetite change, chills, diaphoresis, fatigue, fever and unexpected weight change. HENT: Positive for ear pain, facial swelling and hearing loss. Negative for congestion, dental problem, drooling, ear discharge, mouth sores, nosebleeds, postnasal drip, rhinorrhea, sinus pressure, sneezing, sore throat, tinnitus, trouble swallowing and voice change. Eyes: Negative for photophobia, pain, discharge, redness, itching and visual disturbance. Respiratory: Negative for apnea, cough, choking, chest tightness, shortness of breath, wheezing and stridor. Cardiovascular: Negative for chest pain, palpitations and leg swelling. Gastrointestinal: Negative for abdominal distention, abdominal pain, anal bleeding, blood in stool, constipation, diarrhea, nausea, rectal pain and vomiting. Endocrine: Negative for cold intolerance, heat intolerance, polydipsia, polyphagia and polyuria. Genitourinary: Negative for decreased urine volume, difficulty urinating, discharge, dysuria, enuresis, flank pain, frequency, genital sores, hematuria, penile pain, penile swelling, scrotal swelling, testicular pain and urgency. Musculoskeletal: Negative for arthralgias, back pain, gait problem, joint swelling, myalgias, neck pain and neck stiffness. Skin: Negative for color change, pallor, rash and wound. Allergic/Immunologic: Negative for environmental allergies, food allergies and immunocompromised state.    Neurological: Negative for dizziness, tremors, seizures, syncope, speech difficulty, weakness, light-headedness, numbness and headaches. Hematological: Negative for adenopathy. Does not bruise/bleed easily. Psychiatric/Behavioral: Negative for agitation, behavioral problems, confusion, decreased concentration, dysphoric mood, hallucinations, self-injury, sleep disturbance and suicidal ideas. The patient is not nervous/anxious and is not hyperactive. Patient Active Problem List   Diagnosis    Tobacco abuse    Dyslipidemia    Nasal obstruction    Deviated nasal septum    Chronic maxillary sinusitis    Hypertrophy of both inferior nasal turbinates    Excessive cerumen in both ear canals    Shoulder pain    Right shoulder tendonitis    Obstructive sleep apnea    Obesity (BMI 30-39. 9)    Restless sleeper    Claustrophobia    Daytime somnolence    Atypical chest pain    Anxiety    IFG (impaired fasting glucose)    Metabolic syndrome       Past Medical History:   Diagnosis Date    Anxiety     Asthma     as child    Depression     Hyperlipidemia     Metabolic syndrome 70/50/7426    Obesity     Sleep apnea     Has CPAP        Past Surgical History:   Procedure Laterality Date    APPENDECTOMY  1996    KNEE ARTHROSCOPY  01/2018    SINUS SURGERY  07/16/2014    RT. MAXILLARY ANTROSTOMY WITH TISSUE REMOVAL SEPTOPLASTY TURBINATE REDUCTION EXCISION AND BIOPSLY LESION MOUTH        Current Outpatient Prescriptions   Medication Sig Dispense Refill    traMADol (ULTRAM) 50 MG tablet Take 1 tablet by mouth every 6 hours as needed for Pain for up to 3 days. Intended supply: 3 days. Take lowest dose possible to manage pain.  12 tablet 0    naproxen (NAPROSYN) 500 MG tablet Take 1 tablet by mouth 2 times daily 60 tablet 0    ciprofloxacin-dexamethasone (CIPRODEX) 0.3-0.1 % otic suspension Place 4 drops into the left ear 2 times daily for 7 days 1 Bottle 0    Pseudoephedrine-APAP-DM (DAYQUIL PO) Take by mouth as needed       ibuprofen

## 2018-07-16 NOTE — ED PROVIDER NOTES
Depression; Hyperlipidemia; Metabolic syndrome; Obesity; and Sleep apnea. SURGICAL HISTORY      has a past surgical history that includes Appendectomy () and sinus surgery (2014). CURRENT MEDICATIONS       Discharge Medication List as of 2018  8:54 AM      CONTINUE these medications which have NOT CHANGED    Details   Pseudoephedrine-APAP-DM (DAYQUIL PO) Take by mouthHistorical Med      ibuprofen (ADVIL;MOTRIN) 800 MG tablet Take 800 mg by mouth every 6 hours as needed for PainHistorical Med      CPAP Machine MISC Starting Thu 10/26/2017, Disp-1 each, R-0, PrintPlease change CPAP pressure to 10 cm H20. ALLERGIES     has No Known Allergies. FAMILY HISTORY     indicated that his mother is alive. He indicated that his father is . He indicated that his maternal grandmother is . He indicated that his maternal grandfather is . He indicated that his paternal grandmother is . He indicated that his paternal grandfather is . family history includes Arthritis in his maternal grandfather, maternal grandmother, paternal grandfather, and paternal grandmother; Cancer in his father; Diabetes in his maternal grandfather, maternal grandmother, paternal grandfather, and paternal grandmother; Heart Disease in his maternal grandfather; Kidney Disease in his maternal grandfather; Stroke in his paternal grandmother. SOCIAL HISTORY      reports that he has been smoking Cigarettes. He started smoking about 22 years ago. He has a 20.00 pack-year smoking history. He has never used smokeless tobacco. He reports that he does not drink alcohol or use drugs. PHYSICAL EXAM     INITIAL VITALS:  height is 5' 10\" (1.778 m) and weight is 277 lb (125.6 kg). His oral temperature is 98 °F (36.7 °C). His blood pressure is 136/92 (abnormal) and his pulse is 85. His respiration is 18 and oxygen saturation is 96%.     Physical Exam   Constitutional: He is oriented to person, electronically signed by Dr. Anusha Duvall on 7/16/2018 8:05 AM          LABS:     Labs Reviewed   CBC WITH AUTO DIFFERENTIAL - Abnormal; Notable for the following:        Result Value    WBC 13.7 (*)     RDW-SD 46.5 (*)     Segs Absolute 10.3 (*)     Monocytes # 1.4 (*)     Immature Grans (Abs) 0.10 (*)     All other components within normal limits   BASIC METABOLIC PANEL - Abnormal; Notable for the following:     Glucose 115 (*)     All other components within normal limits   ANAEROBIC AND AEROBIC CULTURE   ANION GAP   OSMOLALITY   GLOMERULAR FILTRATION RATE, ESTIMATED       EMERGENCY DEPARTMENT COURSE:   Vitals:    Vitals:    07/16/18 0617 07/16/18 0715   BP: (!) 144/97 (!) 136/92   Pulse: 87 85   Resp: 21 18   Temp: 98 °F (36.7 °C)    TempSrc: Oral    SpO2: 97% 96%   Weight: 277 lb (125.6 kg)    Height: 5' 10\" (1.778 m)        0650 AM: The patient was seen and evaluated. Per. Labs and imaging were ordered. The patient was treated for pain with Norco while in the department. Labs were reviewed. The patient does have leukocytosis with white blood cell count 13.7. Hemoglobin 15.2 hematocrit 44%. Blood cultures essentially normal.  Aerobic and anaerobic culture was collected of the left ear canal.  CT scan of the sella turcica shows that the mastoid air cells have to see small partial opacification, normally aerated, no bony destructive process. Is no abscess or cellulitis noted over the temporal mandibular joint. MDM:  I contacted Latasha Wynn NP ear nose and throat. She was able to review the labs and images of the CT scan. This is thought to be a severe otitis externa. She advised that she can see the patient in the office today at 11:30 to clean out the ear canal, evaluate and place airway and antibiotics. Until being evaluated, I will place an ear wick in the left ear canal and apply Cipro eardrops. Dexamethasone eardrops are not available at [facility]. Ce[facility] at this moment.   The patient

## 2018-07-16 NOTE — ED NOTES
Linda Andrea, NP at bedside to updated on results and plan of care.       Andrea Hanks, RN  07/16/18 6100

## 2018-07-16 NOTE — ED NOTES
Anaerobic and aerobic cultures obtained per Brooks Rizvi NP and sent to lab. Pt tolerated well. Rprts decreased pain following med administration and prior to cultures obtained. Lights dimmed. Call light within reach. Will continue to monitor for safety and comfort.       Andrea Hanks RN  07/16/18 8088

## 2018-07-16 NOTE — ED TRIAGE NOTES
Patient arrived to ED with c/o left ear pain that has been ongoing since Friday without improvement. Patient went to urgent care yesterday and was started on Ofloxacin ear drops and was given a steroid injection. Patient reports that pain has not improved and he feels as if it is getting worse. Denies dizziness or other symptoms. States that he does feel some jaw pain and toothache with it. The patient reports that he did fall on the left side and later after that he noticed the pain has gotten worse.

## 2018-07-17 ENCOUNTER — TELEPHONE (OUTPATIENT)
Dept: ENT CLINIC | Age: 38
End: 2018-07-17

## 2018-07-17 NOTE — TELEPHONE ENCOUNTER
Yes the otowick is working properly- it will turn a discolored, and it will take some time to absorb on the wick. The swelling will take several days of drops to help resolve. He can shower. Keep water out of the ear (can use cotton in outer ear).

## 2018-07-17 NOTE — TELEPHONE ENCOUNTER
Called patient and informed him that the otowick is working properly. Informed that it will turn discolored, and it will take some time to absorb on the wick. Informed the swelling will take several days of drops of drop to help resolve. informed patient he can shower. Keep ear dry can use cotton in the outer ear. He verbalized understanding.

## 2018-07-17 NOTE — TELEPHONE ENCOUNTER
Patients daughter in law called in and states she has a couple of questions, patient had otowick placed yesterday in the er and seen Dr Cheryl Corona in the office and his ear is swollen still. Checked patients HIPAA and daughter in law is not on HIPAA, spoke with patient and he states it is fine to talk to her, daughter in law states when they place the drops in his ear she can see it otowick and it has turned a yellowish/orangish color and also states the ear drops just sit there for awhile before being absorbed. Patient daughter in law just want to make sure that is what it is suppose to do, also she wants to know if patient can take a shower. Please advise.

## 2018-07-20 ENCOUNTER — OFFICE VISIT (OUTPATIENT)
Dept: ENT CLINIC | Age: 38
End: 2018-07-20
Payer: COMMERCIAL

## 2018-07-20 VITALS
TEMPERATURE: 98.3 F | SYSTOLIC BLOOD PRESSURE: 120 MMHG | RESPIRATION RATE: 16 BRPM | BODY MASS INDEX: 39.39 KG/M2 | WEIGHT: 274.5 LBS | DIASTOLIC BLOOD PRESSURE: 82 MMHG | HEART RATE: 72 BPM

## 2018-07-20 DIAGNOSIS — H60.312 ACUTE DIFFUSE OTITIS EXTERNA OF LEFT EAR: Primary | ICD-10-CM

## 2018-07-20 LAB
AEROBIC CULTURE: ABNORMAL
AEROBIC CULTURE: ABNORMAL
ANAEROBIC CULTURE: ABNORMAL
GRAM STAIN RESULT: ABNORMAL
ORGANISM: ABNORMAL

## 2018-07-20 PROCEDURE — G8427 DOCREV CUR MEDS BY ELIG CLIN: HCPCS | Performed by: OTOLARYNGOLOGY

## 2018-07-20 PROCEDURE — G8417 CALC BMI ABV UP PARAM F/U: HCPCS | Performed by: OTOLARYNGOLOGY

## 2018-07-20 PROCEDURE — 99212 OFFICE O/P EST SF 10 MIN: CPT | Performed by: OTOLARYNGOLOGY

## 2018-07-20 PROCEDURE — 4130F TOPICAL PREP RX AOE: CPT | Performed by: OTOLARYNGOLOGY

## 2018-07-20 PROCEDURE — 4004F PT TOBACCO SCREEN RCVD TLK: CPT | Performed by: OTOLARYNGOLOGY

## 2018-07-20 ASSESSMENT — ENCOUNTER SYMPTOMS
NAUSEA: 0
BLOOD IN STOOL: 0
SINUS PRESSURE: 0
APNEA: 0
ABDOMINAL PAIN: 0
EYE REDNESS: 0
DIARRHEA: 0
EYE DISCHARGE: 0
CHEST TIGHTNESS: 0
BACK PAIN: 0
EYE ITCHING: 0
WHEEZING: 0
PHOTOPHOBIA: 0
COLOR CHANGE: 0
ANAL BLEEDING: 0
VOICE CHANGE: 0
EYE PAIN: 0
RHINORRHEA: 0
SHORTNESS OF BREATH: 0
COUGH: 0
SORE THROAT: 0
CONSTIPATION: 0
ABDOMINAL DISTENTION: 0
CHOKING: 0
TROUBLE SWALLOWING: 0
FACIAL SWELLING: 0
VOMITING: 0
RECTAL PAIN: 0
STRIDOR: 0

## 2018-07-27 ENCOUNTER — OFFICE VISIT (OUTPATIENT)
Dept: ENT CLINIC | Age: 38
End: 2018-07-27
Payer: COMMERCIAL

## 2018-07-27 VITALS
DIASTOLIC BLOOD PRESSURE: 74 MMHG | HEART RATE: 64 BPM | SYSTOLIC BLOOD PRESSURE: 120 MMHG | RESPIRATION RATE: 16 BRPM | WEIGHT: 272.8 LBS | BODY MASS INDEX: 39.14 KG/M2 | TEMPERATURE: 98 F

## 2018-07-27 DIAGNOSIS — H61.22 LEFT EAR IMPACTED CERUMEN: ICD-10-CM

## 2018-07-27 DIAGNOSIS — H61.21 RIGHT EAR IMPACTED CERUMEN: Primary | ICD-10-CM

## 2018-07-27 PROCEDURE — 99213 OFFICE O/P EST LOW 20 MIN: CPT | Performed by: NURSE PRACTITIONER

## 2018-07-27 PROCEDURE — G8417 CALC BMI ABV UP PARAM F/U: HCPCS | Performed by: NURSE PRACTITIONER

## 2018-07-27 PROCEDURE — 4004F PT TOBACCO SCREEN RCVD TLK: CPT | Performed by: NURSE PRACTITIONER

## 2018-07-27 PROCEDURE — G8427 DOCREV CUR MEDS BY ELIG CLIN: HCPCS | Performed by: NURSE PRACTITIONER

## 2018-07-27 ASSESSMENT — ENCOUNTER SYMPTOMS
EYE REDNESS: 0
RECTAL PAIN: 0
ABDOMINAL DISTENTION: 0
PHOTOPHOBIA: 0
SHORTNESS OF BREATH: 0
VOMITING: 0
CHOKING: 0
WHEEZING: 0
NAUSEA: 0
EYE DISCHARGE: 0
TROUBLE SWALLOWING: 0
EYE PAIN: 0
APNEA: 0
BACK PAIN: 0
FACIAL SWELLING: 0
SINUS PRESSURE: 0
EYE ITCHING: 0
SORE THROAT: 0
ABDOMINAL PAIN: 0
STRIDOR: 0
BLOOD IN STOOL: 0
CONSTIPATION: 0
ANAL BLEEDING: 0
RHINORRHEA: 0
CHEST TIGHTNESS: 0
DIARRHEA: 0
COUGH: 0
COLOR CHANGE: 0
VOICE CHANGE: 0

## 2018-07-27 NOTE — PROGRESS NOTES
[facility]  [address] [facility]  [address] [place] [address] [address] [address]  Dept: [phone]  Dept Fax: [phone]  Loc: [phone]    Paul Ruiz is a 43 y.o. male who was referred by No ref. provider found for:  Chief Complaint   Patient presents with    Follow-up     Patient here for 1 week follow up for Acute diffuse otitis externa of left ear. Keo Didier HPI:       Here for follow up left acute otitis externa. Ear is felling better, but hearing is still decreased and feels like something is in the ear. No ear pain or ear drainage. Subjective:        Review of Systems   Constitutional: Negative for activity change, appetite change, chills, diaphoresis, fatigue, fever and unexpected weight change. HENT: Negative for congestion, dental problem, drooling, ear discharge, ear pain, facial swelling, hearing loss, mouth sores, nosebleeds, postnasal drip, rhinorrhea, sinus pressure, sneezing, sore throat, tinnitus, trouble swallowing and voice change. Eyes: Negative for photophobia, pain, discharge, redness, itching and visual disturbance. Respiratory: Negative for apnea, cough, choking, chest tightness, shortness of breath, wheezing and stridor. Cardiovascular: Negative for chest pain, palpitations and leg swelling. Gastrointestinal: Negative for abdominal distention, abdominal pain, anal bleeding, blood in stool, constipation, diarrhea, nausea, rectal pain and vomiting. Endocrine: Negative for cold intolerance, heat intolerance, polydipsia, polyphagia and polyuria. Genitourinary: Negative for decreased urine volume, difficulty urinating, discharge, dysuria, enuresis, flank pain, frequency, genital sores, hematuria, penile pain, penile swelling, scrotal swelling, testicular pain and urgency. Musculoskeletal: Negative for arthralgias, back pain, gait problem, joint swelling, myalgias, neck pain and neck stiffness.    Skin: Negative for color change, pallor, rash

## 2018-09-06 ENCOUNTER — HOSPITAL ENCOUNTER (EMERGENCY)
Age: 38
Discharge: HOME | End: 2018-09-06
Payer: COMMERCIAL

## 2018-09-06 VITALS
RESPIRATION RATE: 18 BRPM | HEIGHT: 70 IN | OXYGEN SATURATION: 97 % | BODY MASS INDEX: 38.65 KG/M2 | WEIGHT: 270 LBS | DIASTOLIC BLOOD PRESSURE: 81 MMHG | SYSTOLIC BLOOD PRESSURE: 133 MMHG | HEART RATE: 94 BPM | TEMPERATURE: 99.8 F

## 2018-09-06 DIAGNOSIS — J02.9 ACUTE PHARYNGITIS, UNSPECIFIED ETIOLOGY: Primary | ICD-10-CM

## 2018-09-06 LAB
GROUP A STREP CULTURE, REFLEX: NEGATIVE
REFLEX THROAT C + S: NORMAL

## 2018-09-06 PROCEDURE — 87070 CULTURE OTHR SPECIMN AEROBIC: CPT

## 2018-09-06 PROCEDURE — 99213 OFFICE O/P EST LOW 20 MIN: CPT | Performed by: NURSE PRACTITIONER

## 2018-09-06 PROCEDURE — 99213 OFFICE O/P EST LOW 20 MIN: CPT

## 2018-09-06 RX ORDER — DEXTROMETHORPHAN HYDROBROMIDE AND PROMETHAZINE HYDROCHLORIDE 15; 6.25 MG/5ML; MG/5ML
5 SYRUP ORAL NIGHTLY PRN
Qty: 100 ML | Refills: 0 | Status: SHIPPED | OUTPATIENT
Start: 2018-09-06 | End: 2018-09-11

## 2018-09-06 RX ORDER — AZELASTINE 1 MG/ML
1 SPRAY, METERED NASAL 2 TIMES DAILY
Qty: 1 BOTTLE | Refills: 0 | Status: SHIPPED | OUTPATIENT
Start: 2018-09-06 | End: 2018-10-31 | Stop reason: ALTCHOICE

## 2018-09-06 ASSESSMENT — ENCOUNTER SYMPTOMS
ABDOMINAL PAIN: 0
STRIDOR: 0
CONSTIPATION: 0
DIARRHEA: 0
SINUS CONGESTION: 1
SHORTNESS OF BREATH: 0
VOMITING: 0
COUGH: 1
SORE THROAT: 1
TROUBLE SWALLOWING: 0
WHEEZING: 0
VOICE CHANGE: 0
EYE DISCHARGE: 0
SINUS PRESSURE: 1
CHOKING: 0
APNEA: 0
RHINORRHEA: 1
CHEST TIGHTNESS: 0
NAUSEA: 0

## 2018-09-06 NOTE — ED TRIAGE NOTES
Patient to room with c/o sore throat and fever beginning yesterday. Voices concerns of strep throat. States two of his children recently has sore throats.

## 2018-09-06 NOTE — ED PROVIDER NOTES
stiffness. Skin: Negative for rash. Neurological: Positive for headaches. Negative for dizziness and light-headedness. Hematological: Negative for adenopathy. PAST MEDICAL HISTORY         Diagnosis Date    Anxiety     Asthma     as child    Depression     Hyperlipidemia     Metabolic syndrome 55/52/2053    Obesity     Sleep apnea     Has CPAP       SURGICAL HISTORY     Patient  has a past surgical history that includes Appendectomy (1996); sinus surgery (07/16/2014); and Knee arthroscopy (01/2018). CURRENT MEDICATIONS       Previous Medications    CPAP MACHINE MISC    by Does not apply route Please change CPAP pressure to 10 cm H20. IBUPROFEN (ADVIL;MOTRIN) 800 MG TABLET    Take 800 mg by mouth every 6 hours as needed for Pain    NAPROXEN (NAPROSYN) 500 MG TABLET    Take 1 tablet by mouth 2 times daily       ALLERGIES     Patient is has No Known Allergies. FAMILY HISTORY     Patient's family history includes Arthritis in his maternal grandfather, maternal grandmother, paternal grandfather, and paternal grandmother; Cancer in his father; Diabetes in his maternal grandfather, maternal grandmother, paternal grandfather, and paternal grandmother; Heart Disease in his maternal grandfather; Kidney Disease in his maternal grandfather; Stroke in his paternal grandmother. SOCIAL HISTORY     Patient  reports that he has been smoking Cigarettes. He started smoking about 22 years ago. He has a 20.00 pack-year smoking history. He has never used smokeless tobacco. He reports that he does not drink alcohol or use drugs. PHYSICAL EXAM     ED TRIAGE VITALS  BP: 133/81, Temp: 99.8 °F (37.7 °C), Pulse: 94, Resp: 18, SpO2: 97 %  Physical Exam   Constitutional: He is oriented to person, place, and time. Vital signs are normal. He appears well-developed and well-nourished. He is active and cooperative. Non-toxic appearance. He does not have a sickly appearance. He does not appear ill. No distress.

## 2018-09-08 LAB — THROAT/NOSE CULTURE: NORMAL

## 2018-10-31 ENCOUNTER — OFFICE VISIT (OUTPATIENT)
Dept: PULMONOLOGY | Age: 38
End: 2018-10-31
Payer: COMMERCIAL

## 2018-10-31 VITALS
OXYGEN SATURATION: 96 % | DIASTOLIC BLOOD PRESSURE: 78 MMHG | BODY MASS INDEX: 40.52 KG/M2 | WEIGHT: 283 LBS | HEART RATE: 84 BPM | SYSTOLIC BLOOD PRESSURE: 126 MMHG | HEIGHT: 70 IN

## 2018-10-31 DIAGNOSIS — E66.01 MORBID OBESITY WITH BMI OF 40.0-44.9, ADULT (HCC): ICD-10-CM

## 2018-10-31 DIAGNOSIS — Z72.0 TOBACCO ABUSE: ICD-10-CM

## 2018-10-31 DIAGNOSIS — J34.2 DEVIATED NASAL SEPTUM: ICD-10-CM

## 2018-10-31 DIAGNOSIS — G47.33 OBSTRUCTIVE SLEEP APNEA: Primary | ICD-10-CM

## 2018-10-31 DIAGNOSIS — G47.10 HYPERSOMNIA: ICD-10-CM

## 2018-10-31 PROCEDURE — G8484 FLU IMMUNIZE NO ADMIN: HCPCS | Performed by: PHYSICIAN ASSISTANT

## 2018-10-31 PROCEDURE — G8417 CALC BMI ABV UP PARAM F/U: HCPCS | Performed by: PHYSICIAN ASSISTANT

## 2018-10-31 PROCEDURE — 99214 OFFICE O/P EST MOD 30 MIN: CPT | Performed by: PHYSICIAN ASSISTANT

## 2018-10-31 PROCEDURE — 4004F PT TOBACCO SCREEN RCVD TLK: CPT | Performed by: PHYSICIAN ASSISTANT

## 2018-10-31 PROCEDURE — G8427 DOCREV CUR MEDS BY ELIG CLIN: HCPCS | Performed by: PHYSICIAN ASSISTANT

## 2018-10-31 RX ORDER — FLUTICASONE PROPIONATE 50 MCG
2 SPRAY, SUSPENSION (ML) NASAL DAILY
Qty: 1 BOTTLE | Refills: 6 | Status: SHIPPED | OUTPATIENT
Start: 2018-10-31 | End: 2019-03-08

## 2018-10-31 ASSESSMENT — ENCOUNTER SYMPTOMS
BACK PAIN: 0
WHEEZING: 0
EYES NEGATIVE: 1
CHEST TIGHTNESS: 0
STRIDOR: 0
NAUSEA: 0
ALLERGIC/IMMUNOLOGIC NEGATIVE: 1
DIARRHEA: 0
SHORTNESS OF BREATH: 0
COUGH: 0

## 2018-10-31 NOTE — PROGRESS NOTES
educated about my impression and plan. Patient verbalizes understanding.   We will see Juan High back in: 6 months with download    Venita Cee PA-C, MPAS  10/31/2018

## 2018-10-31 NOTE — PATIENT INSTRUCTIONS
Nicotine Anonymous, for people who are trying to quit smoking. · Consider signing up for a smoking cessation program, such as the American Lung Association's Freedom from Smoking program.  · Get text messaging support. Go to the website at www.smokefree. gov to sign up for the [facility] program.  · Set a quit date. Pick your date carefully so that it is not right in the middle of a big deadline or stressful time. Once you quit, do not even take a puff. Get rid of all ashtrays and lighters after your last cigarette. Clean your house and your clothes so that they do not smell of smoke. · Learn how to be a nonsmoker. Think about ways you can avoid those things that make you reach for a cigarette. ¨ Avoid situations that put you at greatest risk for smoking. For some people, it is hard to have a drink with friends without smoking. For others, they might skip a coffee break with coworkers who smoke. ¨ Change your daily routine. Take a different route to work or eat a meal in a different place. · Cut down on stress. Calm yourself or release tension by doing an activity you enjoy, such as reading a book, taking a hot bath, or gardening. · Talk to your doctor or pharmacist about nicotine replacement therapy, which replaces the nicotine in your body. You still get nicotine but you do not use tobacco. Nicotine replacement products help you slowly reduce the amount of nicotine you need. These products come in several forms, many of them available over-the-counter:  ¨ Nicotine patches  ¨ Nicotine gum and lozenges  ¨ Nicotine inhaler  · Ask your doctor about bupropion (Wellbutrin) or varenicline (Chantix), which are prescription medicines. They do not contain nicotine. They help you by reducing withdrawal symptoms, such as stress and anxiety. · Some people find hypnosis, acupuncture, and massage helpful for ending the smoking habit. · Eat a healthy diet and get regular exercise.  Having healthy habits will help your

## 2018-11-01 ENCOUNTER — HOSPITAL ENCOUNTER (OUTPATIENT)
Age: 38
Discharge: HOME | End: 2018-11-01
Payer: COMMERCIAL

## 2018-11-01 DIAGNOSIS — G47.10 HYPERSOMNIA: ICD-10-CM

## 2018-11-01 LAB
FOLATE: 8.1 NG/ML (ref 4.8–24.2)
TSH SERPL DL<=0.05 MIU/L-ACNC: 2.2 UIU/ML (ref 0.4–4.2)
VITAMIN B-12: 266 PG/ML (ref 211–911)
VITAMIN D 25-HYDROXY: 15 NG/ML (ref 30–100)

## 2018-11-01 PROCEDURE — 36415 COLL VENOUS BLD VENIPUNCTURE: CPT

## 2018-11-01 PROCEDURE — 84443 ASSAY THYROID STIM HORMONE: CPT

## 2018-11-01 PROCEDURE — 82746 ASSAY OF FOLIC ACID SERUM: CPT

## 2018-11-01 PROCEDURE — 82607 VITAMIN B-12: CPT

## 2018-11-01 PROCEDURE — 82306 VITAMIN D 25 HYDROXY: CPT

## 2018-11-02 ENCOUNTER — TELEPHONE (OUTPATIENT)
Dept: PULMONOLOGY | Age: 38
End: 2018-11-02

## 2018-11-02 DIAGNOSIS — E55.9 VITAMIN D DEFICIENCY: Primary | ICD-10-CM

## 2018-12-15 ENCOUNTER — HOSPITAL ENCOUNTER (EMERGENCY)
Age: 38
Discharge: HOME | End: 2018-12-15
Payer: COMMERCIAL

## 2018-12-15 VITALS
OXYGEN SATURATION: 97 % | WEIGHT: 280 LBS | HEART RATE: 81 BPM | TEMPERATURE: 99.4 F | BODY MASS INDEX: 40.18 KG/M2 | DIASTOLIC BLOOD PRESSURE: 95 MMHG | SYSTOLIC BLOOD PRESSURE: 158 MMHG | RESPIRATION RATE: 16 BRPM

## 2018-12-15 DIAGNOSIS — Z11.3 SCREENING EXAMINATION FOR STD (SEXUALLY TRANSMITTED DISEASE): ICD-10-CM

## 2018-12-15 DIAGNOSIS — Z20.2 EXPOSURE TO CHLAMYDIA: Primary | ICD-10-CM

## 2018-12-15 LAB
BILIRUBIN URINE: NEGATIVE
BLOOD, URINE: ABNORMAL
CHARACTER, URINE: CLEAR
COLOR: YELLOW
GLUCOSE, URINE: NEGATIVE MG/DL
KETONES, URINE: NEGATIVE
LEUKOCYTES, UA: ABNORMAL
NITRATE, UA: NEGATIVE
PH UA: 6 (ref 5–9)
PROTEIN UA: NEGATIVE MG/DL
REFLEX TO URINE C & S: ABNORMAL
SPECIFIC GRAVITY UA: 1.02 (ref 1–1.03)
UROBILINOGEN, URINE: 0.2 EU/DL (ref 0–1)

## 2018-12-15 PROCEDURE — 6370000000 HC RX 637 (ALT 250 FOR IP): Performed by: NURSE PRACTITIONER

## 2018-12-15 PROCEDURE — 99213 OFFICE O/P EST LOW 20 MIN: CPT | Performed by: NURSE PRACTITIONER

## 2018-12-15 PROCEDURE — 2500000003 HC RX 250 WO HCPCS: Performed by: NURSE PRACTITIONER

## 2018-12-15 PROCEDURE — 6360000002 HC RX W HCPCS: Performed by: NURSE PRACTITIONER

## 2018-12-15 PROCEDURE — 87591 N.GONORRHOEAE DNA AMP PROB: CPT

## 2018-12-15 PROCEDURE — 99213 OFFICE O/P EST LOW 20 MIN: CPT

## 2018-12-15 PROCEDURE — 81003 URINALYSIS AUTO W/O SCOPE: CPT

## 2018-12-15 PROCEDURE — 87491 CHLMYD TRACH DNA AMP PROBE: CPT

## 2018-12-15 PROCEDURE — 96372 THER/PROPH/DIAG INJ SC/IM: CPT

## 2018-12-15 RX ORDER — AZITHROMYCIN 250 MG/1
1000 TABLET, FILM COATED ORAL ONCE
Status: COMPLETED | OUTPATIENT
Start: 2018-12-15 | End: 2018-12-15

## 2018-12-15 RX ORDER — METRONIDAZOLE 500 MG/1
2000 TABLET ORAL ONCE
Status: COMPLETED | OUTPATIENT
Start: 2018-12-15 | End: 2018-12-15

## 2018-12-15 RX ADMIN — LIDOCAINE HYDROCHLORIDE 250 MG: 10 INJECTION, SOLUTION EPIDURAL; INFILTRATION; INTRACAUDAL; PERINEURAL at 18:16

## 2018-12-15 RX ADMIN — METRONIDAZOLE 2000 MG: 500 TABLET, FILM COATED ORAL at 18:20

## 2018-12-15 RX ADMIN — AZITHROMYCIN 1000 MG: 250 TABLET, FILM COATED ORAL at 18:14

## 2018-12-15 ASSESSMENT — ENCOUNTER SYMPTOMS
ABDOMINAL PAIN: 0
NAUSEA: 0
DIARRHEA: 0
SHORTNESS OF BREATH: 0
VOMITING: 0
CHEST TIGHTNESS: 0

## 2018-12-15 NOTE — ED PROVIDER NOTES
maternal grandfather, maternal grandmother, paternal grandfather, and paternal grandmother; Cancer in his father; Diabetes in his maternal grandfather, maternal grandmother, paternal grandfather, and paternal grandmother; Heart Disease in his maternal grandfather; Kidney Disease in his maternal grandfather; Stroke in his paternal grandmother. SOCIAL HISTORY     Patient  reports that he has been smoking Cigarettes. He started smoking about 23 years ago. He has a 20.00 pack-year smoking history. He has never used smokeless tobacco. He reports that he does not drink alcohol or use drugs. PHYSICAL EXAM     ED TRIAGE VITALS  BP: (!) 158/95, Temp: 99.4 °F (37.4 °C), Pulse: 81, Resp: 16, SpO2: 97 %  Physical Exam   Constitutional: He is oriented to person, place, and time. Vital signs are normal. He appears well-developed and well-nourished. He is cooperative. HENT:   Head: Normocephalic and atraumatic. Mouth/Throat: Mucous membranes are normal.   Eyes: Conjunctivae are normal.   Neck: Normal range of motion and full passive range of motion without pain. Cardiovascular: Normal rate. Pulmonary/Chest: Effort normal.   Neurological: He is alert and oriented to person, place, and time. Skin: Skin is warm and dry. No rash (on exposed surfaces) noted. Psychiatric: He has a normal mood and affect. His speech is normal.   Nursing note and vitals reviewed.       DIAGNOSTIC RESULTS   Labs:  Results for orders placed or performed during the hospital encounter of 12/15/18   UA without Microscopic Reflex C&S   Result Value Ref Range    Glucose, Urine Negative NEGATIVE mg/dl    Bilirubin Urine Negative NEGATIVE    Ketones, Urine Negative NEGATIVE    Specific Gravity, UA 1.020 1.002 - 1.03    Blood, Urine Trace-intact NEGATIVE    pH, UA 6.00 5.0 - 9.0    Protein, UA Negative NEGATIVE mg/dl    Urobilinogen, Urine 0.20 0.0 - 1.0 eu/dl    Nitrate, UA Negative NEGATIVE    LEUKOCYTES, UA Small (A) NEGATIVE    Color, UA Yellow STRAW-YELL    Character, Urine Clear CLEAR-SL C    REFLEX TO URINE C & S NOT INDICATED        IMAGING:  No orders to display     URGENT CARE COURSE:     Vitals:    12/15/18 1756   BP: (!) 158/95   Pulse: 81   Resp: 16   Temp: 99.4 °F (37.4 °C)   TempSrc: Temporal   SpO2: 97%   Weight: 280 lb (127 kg)       Medications   cefTRIAXone (ROCEPHIN) 250 mg in lidocaine 1 % 1 mL IM Injection (250 mg Intramuscular Given 12/15/18 1816)   azithromycin (ZITHROMAX) tablet 1,000 mg (1,000 mg Oral Given 12/15/18 1814)   metroNIDAZOLE (FLAGYL) tablet 2,000 mg (2,000 mg Oral Given 12/15/18 1820)     PROCEDURES:  None  FINALIMPRESSION      1. Exposure to chlamydia    2. Screening examination for STD (sexually transmitted disease)        DISPOSITION/PLAN   DISPOSITION Decision To Discharge 12/15/2018 06:06:37 PM   Treated in the urgent care as above. Chlamydia and gonorrhea pending. Refrain from all sexual activity for the next 7 days. Notify your sexual partner(s) regarding positive results so that they can be tested and treated as well. Patient has been encouraged to use condoms with each sexual encounter. [facility]  [facility]ually [facility]                Address: [place], [address]   [phone]  Regarding syphilis and HIV testing as per [facility] guidelines  [address] [facility]                                                               [phone]      Do not drink alcohol or consume foods or medicines that contain propylene glycol while you are taking metronidazole and for at least 3 days after you stop taking it.     PATIENT REFERRED TO:  Mert Ann 1, [address]  [phone]    Schedule an appointment as soon as possible for a visit in 3 days  for further evalation, If symptoms worsen, GO DIRECTLY TO [address] [place] [address] [facility] Department  [address] [place] [address]

## 2018-12-15 NOTE — ED NOTES
Patient understood instructions verbally,  Follow up with PCP with any concerns for STD,  ambulated self to lobby,stable condition.       Lennox Laster, LPN  18/50/33 6050

## 2018-12-16 LAB
CHLAMYDIA TRACHOMATIS BY RT-PCR: NOT DETECTED
CT/NG SOURCE: NORMAL
NEISSERIA GONORRHOEAE BY RT-PCR: NOT DETECTED

## 2019-03-08 ENCOUNTER — OFFICE VISIT (OUTPATIENT)
Dept: FAMILY MEDICINE CLINIC | Age: 39
End: 2019-03-08
Payer: COMMERCIAL

## 2019-03-08 ENCOUNTER — HOSPITAL ENCOUNTER (OUTPATIENT)
Age: 39
Discharge: HOME | End: 2019-03-08
Payer: COMMERCIAL

## 2019-03-08 ENCOUNTER — HOSPITAL ENCOUNTER (OUTPATIENT)
Dept: GENERAL RADIOLOGY | Age: 39
Discharge: HOME | End: 2019-03-08
Payer: COMMERCIAL

## 2019-03-08 VITALS
HEART RATE: 80 BPM | RESPIRATION RATE: 16 BRPM | WEIGHT: 288.1 LBS | DIASTOLIC BLOOD PRESSURE: 76 MMHG | BODY MASS INDEX: 41.24 KG/M2 | HEIGHT: 70 IN | SYSTOLIC BLOOD PRESSURE: 122 MMHG

## 2019-03-08 DIAGNOSIS — M79.672 ACUTE FOOT PAIN, LEFT: Primary | ICD-10-CM

## 2019-03-08 DIAGNOSIS — M79.672 ACUTE FOOT PAIN, LEFT: ICD-10-CM

## 2019-03-08 PROCEDURE — G8417 CALC BMI ABV UP PARAM F/U: HCPCS | Performed by: FAMILY MEDICINE

## 2019-03-08 PROCEDURE — 4004F PT TOBACCO SCREEN RCVD TLK: CPT | Performed by: FAMILY MEDICINE

## 2019-03-08 PROCEDURE — G8427 DOCREV CUR MEDS BY ELIG CLIN: HCPCS | Performed by: FAMILY MEDICINE

## 2019-03-08 PROCEDURE — G8484 FLU IMMUNIZE NO ADMIN: HCPCS | Performed by: FAMILY MEDICINE

## 2019-03-08 PROCEDURE — 73630 X-RAY EXAM OF FOOT: CPT

## 2019-03-08 PROCEDURE — 99213 OFFICE O/P EST LOW 20 MIN: CPT | Performed by: FAMILY MEDICINE

## 2019-03-08 RX ORDER — METHYLPREDNISOLONE 4 MG/1
TABLET ORAL
Qty: 1 KIT | Refills: 0 | Status: SHIPPED | OUTPATIENT
Start: 2019-03-08 | End: 2019-03-14

## 2019-03-08 ASSESSMENT — ENCOUNTER SYMPTOMS
GASTROINTESTINAL NEGATIVE: 1
RESPIRATORY NEGATIVE: 1

## 2019-03-08 ASSESSMENT — PATIENT HEALTH QUESTIONNAIRE - PHQ9
2. FEELING DOWN, DEPRESSED OR HOPELESS: 0
SUM OF ALL RESPONSES TO PHQ9 QUESTIONS 1 & 2: 0
1. LITTLE INTEREST OR PLEASURE IN DOING THINGS: 0
SUM OF ALL RESPONSES TO PHQ QUESTIONS 1-9: 0
SUM OF ALL RESPONSES TO PHQ QUESTIONS 1-9: 0

## 2019-04-03 ENCOUNTER — TELEPHONE (OUTPATIENT)
Dept: FAMILY MEDICINE CLINIC | Age: 39
End: 2019-04-03

## 2019-04-03 RX ORDER — ETODOLAC 500 MG/1
500 TABLET, FILM COATED ORAL 2 TIMES DAILY
Qty: 60 TABLET | Refills: 0 | Status: SHIPPED | OUTPATIENT
Start: 2019-04-03 | End: 2019-05-10 | Stop reason: ALTCHOICE

## 2019-04-03 NOTE — TELEPHONE ENCOUNTER
Patient is calling stating the steroid helped for a while and 2 days ago his left foot is very painful, hard to walk, touch the outside of foot it sends a jolt, he is wondering what to do next. Pharmacy is [facility] [address], he states he does not want the same steroid as before as it made him silverman.  Please advise at [phone]

## 2019-04-30 ENCOUNTER — OFFICE VISIT (OUTPATIENT)
Dept: PULMONOLOGY | Age: 39
End: 2019-04-30
Payer: COMMERCIAL

## 2019-04-30 VITALS
HEIGHT: 70 IN | HEART RATE: 88 BPM | SYSTOLIC BLOOD PRESSURE: 124 MMHG | OXYGEN SATURATION: 98 % | DIASTOLIC BLOOD PRESSURE: 78 MMHG | BODY MASS INDEX: 41.97 KG/M2 | WEIGHT: 293.2 LBS

## 2019-04-30 DIAGNOSIS — E55.9 VITAMIN D DEFICIENCY: ICD-10-CM

## 2019-04-30 DIAGNOSIS — G47.33 OBSTRUCTIVE SLEEP APNEA: Primary | ICD-10-CM

## 2019-04-30 DIAGNOSIS — E66.01 MORBID OBESITY WITH BMI OF 40.0-44.9, ADULT (HCC): ICD-10-CM

## 2019-04-30 DIAGNOSIS — G47.9 RESTLESS SLEEPER: ICD-10-CM

## 2019-04-30 DIAGNOSIS — R09.81 NASAL CONGESTION: ICD-10-CM

## 2019-04-30 PROCEDURE — 4004F PT TOBACCO SCREEN RCVD TLK: CPT | Performed by: PHYSICIAN ASSISTANT

## 2019-04-30 PROCEDURE — G8427 DOCREV CUR MEDS BY ELIG CLIN: HCPCS | Performed by: PHYSICIAN ASSISTANT

## 2019-04-30 PROCEDURE — G8417 CALC BMI ABV UP PARAM F/U: HCPCS | Performed by: PHYSICIAN ASSISTANT

## 2019-04-30 PROCEDURE — 99213 OFFICE O/P EST LOW 20 MIN: CPT | Performed by: PHYSICIAN ASSISTANT

## 2019-04-30 ASSESSMENT — ENCOUNTER SYMPTOMS
CHEST TIGHTNESS: 0
DIARRHEA: 0
EYES NEGATIVE: 1
ALLERGIC/IMMUNOLOGIC NEGATIVE: 1
SHORTNESS OF BREATH: 0
COUGH: 0
BACK PAIN: 0
NAUSEA: 0
WHEEZING: 0
STRIDOR: 0

## 2019-04-30 NOTE — PROGRESS NOTES
[facility] [address]         [id number]  4/30/2019   Chief Complaint   Patient presents with    Follow-up     SUSANA 6 month follow up with a download        Pt of Dr. Jackie aSntos    PAP Download:   Original or initialAHI: 11.5     Date of initial study: 4/7/15      Compliant  100%     Noncompliant 0 %     PAP Type CPAP  Level  10 cmH20  Avg Hrs/Day 8:44  AHI: 0.3   Recorded compliance dates , 3/31/19  to 4/29/19   Machine/Mfg: REMstar Interface: Nasal     Provider:    Rosa Diana           [employer]        __ [employer]    __ Chet Steven            __[facility] __[facility]   __[place]:       __Other    Neck Size: 16  Mallampati Mallampati 4  ESS:  6  SAQLI: 73     Presentation:   Opal Marquez presents for sleep medicine follow up for obstructive sleep apnea  Since the last visit, Opal Marquez is doing well with PAP. He is having some trouble with nasal congestion. He had been following with ENT but has not seen them in over 6 months. He is still having some hypersomnia at times. He is taking Vit D replacement for low level. Equipment issues: The pressure is  acceptable, the mask is acceptable and he  is  using the humidity. Sleep issues:  Do you feel better? Yes and No  More rested? No   Better concentration? no    Progress History:   Since last visit any new medical issues? No  New ER or hospitlal visits? No  Any new or changes in medicines? No  Any new sleep medicines? No        Past Medical History:   Diagnosis Date    Anxiety     Asthma     as child    Depression     Hyperlipidemia     Metabolic syndrome 07/83/7414    Obesity     Sleep apnea     Has CPAP       Past Surgical History:   Procedure Laterality Date    APPENDECTOMY  1996    KNEE ARTHROSCOPY  01/2018   [facility] SINUS SURGERY  07/16/2014    RT.  MAXILLARY ANTROSTOMY WITH TISSUE REMOVAL SEPTOPLASTY TURBINATE REDUCTION EXCISION AND BIOPSLY LESION MOUTH        Social History     Tobacco Use    Smoking status: Current Every Day Smoker     Packs/day: 1.50     Years: 24.00     Pack years: 36.00     Types: Cigarettes     Start date: 12/12/1995    Smokeless tobacco: Never Used   Substance Use Topics    Alcohol use: No     Alcohol/week: 0.0 oz    Drug use: No       No Known Allergies    Current Outpatient Medications   Medication Sig Dispense Refill    CPAP Machine MISC by Does not apply route Please change CPAP pressure to 10 cm H20. 1 each 0    etodolac (LODINE) 500 MG tablet Take 1 tablet by mouth 2 times daily 60 tablet 0     No current facility-administered medications for this visit. Family History   Problem Relation Age of Onset    Cancer Father     Arthritis Maternal Grandmother     Diabetes Maternal Grandmother     Arthritis Maternal Grandfather     Diabetes Maternal Grandfather     Heart Disease Maternal Grandfather     Kidney Disease Maternal Grandfather     Arthritis Paternal Grandmother     Diabetes Paternal Grandmother     Stroke Paternal Grandmother     Arthritis Paternal Grandfather     Diabetes Paternal Grandfather         Review of Systems -   Review of Systems   Constitutional: Negative for activity change, appetite change, chills and fever. HENT: Negative for congestion and postnasal drip. Eyes: Negative. Respiratory: Negative for cough, chest tightness, shortness of breath, wheezing and stridor. Cardiovascular: Negative for chest pain and leg swelling. Gastrointestinal: Negative for diarrhea and nausea. Endocrine: Negative. Genitourinary: Negative. Musculoskeletal: Negative. Negative for arthralgias and back pain. Skin: Negative. Allergic/Immunologic: Negative. Neurological: Negative. Negative for dizziness and light-headedness. Psychiatric/Behavioral: Negative. All other systems reviewed and are negative. Physical Exam:    BMI:  Body mass index is 42.07 kg/m².     Wt Readings from Last 3 Encounters:   04/30/19 293 lb 3.2 oz (133 kg) 03/08/19 288 lb 1.6 oz (130.7 kg)   12/15/18 280 lb (127 kg)     Weight gained 13 lbs over 3 months  Vitals: /78 (Site: Right Upper Arm, Position: Sitting, Cuff Size: Medium Adult)   Pulse 88   Ht 5' 10\" (1.778 m)   Wt 293 lb 3.2 oz (133 kg)   SpO2 98% Comment: on RA  BMI 42.07 kg/m²       Physical Exam   Constitutional: He is oriented to person, place, and time. He appears well-developed and well-nourished. HENT:   Head: Normocephalic and atraumatic. Right Ear: External ear normal.   Left Ear: External ear normal.   Mouth/Throat: Oropharynx is clear and moist.   Eyes: Pupils are equal, round, and reactive to light. Conjunctivae and EOM are normal.   Neck: Normal range of motion. Neck supple. Cardiovascular: Normal rate, regular rhythm and normal heart sounds. Pulmonary/Chest: Effort normal and breath sounds normal.   Abdominal: Soft. Musculoskeletal: Normal range of motion. Neurological: He is alert and oriented to person, place, and time. Skin: Skin is warm and dry. Psychiatric: He has a normal mood and affect. His behavior is normal. Judgment and thought content normal.         ASSESSMENT/DIAGNOSIS     Diagnosis Orders   1. Obstructive sleep apnea     2. Morbid obesity with BMI of 40.0-44.9, adult ([address]     3. Restless sleeper     4. Vitamin D deficiency              Plan   Do you need any equipment today? No  - Continue Vit D  - Will recheck level. - Refer back to ENT for sinus congestion  - He  was advised to continue current positive airway pressure therapy with above described pressure.    - He  advised to keep goodcompliance with current recommended pressure to get optimal results and clinical improvement  - Recommend 7-9 hours of sleep with PAP  - He was advised to call [employer] regarding supplies if needed.   -He call my office for earlier appointment if needed for worsening of sleep symptoms.   - He was instructed on weight loss  - Jolie Meredith was educated about my impression and plan. Patient verbalizesunderstanding.   We will see Augusta Song back in: 1 year with download    Information added by my medical assistant/LPN was reviewed today         José Ingram PA-C, MPAS  4/30/2019

## 2019-04-30 NOTE — PATIENT INSTRUCTIONS
smoking, you improve the health of everyone who now breathes in your smoke. · Their heart, lung, and cancer risks drop, much like yours. · They are sick less. For babies and small children, living smoke-free means they're less likely to have ear infections, pneumonia, and bronchitis. · If you're a woman who is or will be pregnant someday, quitting smoking means a healthier . · Children who are close to you are less likely to become adult smokers. Where can you learn more? Go to https://[employer]."[employer]". org and sign in to your [employer] account. Enter [phone] in the [facility] box to learn more about \"Learning About Benefits From Quitting Smoking. \"     If you do not have an account, please click on the \"Sign Up Now\" link. Current as of: 2018  Content Version: 11.9  © 9961-2950 [employer]. Care instructions adapted under license by [facility] ([facility]). If you have questions about a medical condition or this instruction, always ask your healthcare professional. [address] any warranty or liability for your use of this information.

## 2019-05-10 ENCOUNTER — OFFICE VISIT (OUTPATIENT)
Dept: ENT CLINIC | Age: 39
End: 2019-05-10
Payer: COMMERCIAL

## 2019-05-10 VITALS
BODY MASS INDEX: 41.35 KG/M2 | HEART RATE: 80 BPM | WEIGHT: 288.8 LBS | SYSTOLIC BLOOD PRESSURE: 138 MMHG | HEIGHT: 70 IN | DIASTOLIC BLOOD PRESSURE: 94 MMHG | TEMPERATURE: 98.3 F | RESPIRATION RATE: 14 BRPM

## 2019-05-10 DIAGNOSIS — J34.89 CONCHA BULLOSA: ICD-10-CM

## 2019-05-10 DIAGNOSIS — R40.0 DAYTIME SOMNOLENCE: ICD-10-CM

## 2019-05-10 DIAGNOSIS — G47.33 OBSTRUCTIVE SLEEP APNEA: ICD-10-CM

## 2019-05-10 DIAGNOSIS — J34.3 HYPERTROPHY OF INFERIOR NASAL TURBINATE: ICD-10-CM

## 2019-05-10 DIAGNOSIS — R06.5 MOUTH BREATHING: ICD-10-CM

## 2019-05-10 DIAGNOSIS — J34.2 NASAL SEPTAL DEVIATION: Primary | ICD-10-CM

## 2019-05-10 PROCEDURE — 4004F PT TOBACCO SCREEN RCVD TLK: CPT | Performed by: NURSE PRACTITIONER

## 2019-05-10 PROCEDURE — 99213 OFFICE O/P EST LOW 20 MIN: CPT | Performed by: NURSE PRACTITIONER

## 2019-05-10 PROCEDURE — G8427 DOCREV CUR MEDS BY ELIG CLIN: HCPCS | Performed by: NURSE PRACTITIONER

## 2019-05-10 PROCEDURE — G8417 CALC BMI ABV UP PARAM F/U: HCPCS | Performed by: NURSE PRACTITIONER

## 2019-05-10 ASSESSMENT — ENCOUNTER SYMPTOMS
APNEA: 0
CHOKING: 0
COUGH: 0
SINUS PRESSURE: 0
DIARRHEA: 0
SORE THROAT: 0
STRIDOR: 0
RHINORRHEA: 0
FACIAL SWELLING: 0
NAUSEA: 0
ABDOMINAL PAIN: 0
TROUBLE SWALLOWING: 0
CHEST TIGHTNESS: 0
COLOR CHANGE: 0
WHEEZING: 0
SHORTNESS OF BREATH: 0
VOICE CHANGE: 0
VOMITING: 0

## 2019-05-10 NOTE — PROGRESS NOTES
[facility] Jimmy, NOSE AND THROAT  [address] [address]  Dept: [phone]  Dept Fax: [phone]  Loc: Martin Fallon is a 35 y.o. male who was referred by No ref. provider found for:  Chief Complaint   Patient presents with    Nasal Congestion     Pt. returns for congestion, would like the nasal cavities to be re-evaluated. Frederick Cornelius HPI:       Patient here for evaluation of nasal congestion and obstruction. This has been an ongoing issue for over 6 months. He feels constant nasal congestion and obstruction. + mouth breathing.  + snoring. SUSANA on CPAP. Feels fatigued throughout the day. No allergy or sinus issues. Subjective:        Review of Systems   Constitutional: Negative for activity change, appetite change, chills, diaphoresis, fatigue, fever and unexpected weight change. HENT: Positive for congestion. Negative for dental problem, ear discharge, ear pain, facial swelling, hearing loss, mouth sores, nosebleeds, postnasal drip, rhinorrhea, sinus pressure, sneezing, sore throat, tinnitus, trouble swallowing and voice change. Eyes: Negative for visual disturbance. Respiratory: Negative for apnea, cough, choking, chest tightness, shortness of breath, wheezing and stridor. Cardiovascular: Negative for chest pain, palpitations and leg swelling. Gastrointestinal: Negative for abdominal pain, diarrhea, nausea and vomiting. Endocrine: Negative for cold intolerance, heat intolerance, polydipsia and polyuria. Genitourinary: Negative for difficulty urinating, discharge, dysuria, enuresis, hematuria, penile pain, penile swelling, scrotal swelling, testicular pain and urgency. Musculoskeletal: Negative for arthralgias, gait problem, neck pain and neck stiffness. Skin: Negative for color change, rash and wound.    Allergic/Immunologic: Negative for environmental allergies, food allergies and immunocompromised state. Neurological: Negative for dizziness, seizures, syncope, facial asymmetry, speech difficulty, light-headedness and headaches. Hematological: Negative for adenopathy. Does not bruise/bleed easily. Psychiatric/Behavioral: Negative for confusion, sleep disturbance and suicidal ideas. The patient is not nervous/anxious. Objective:       Physical Exam     This is a 35 y.o. male. Patient is alert and oriented to person, place and time. Mood is happy. No respiratory distress. No nasal voice, no hoarseness. Not obviously hearing impaired. Vitals:    05/10/19 1013   BP: (!) 138/94   Pulse: 80   Resp: 14   Temp: 98.3 °F (36.8 °C)       Head is normocephalic, no obvious masses or lesions. VIVIANA, EOM full. Conjunctivae pink, moist, no discharge. External ears are normal: no scars, lesions or masses. R External auditory canal clear and free of any pathology  L External auditory canal clear and free of any pathology   Tympanic membranes:  R intact, translucent                                            L intact, translucent    Nasal bones: intact  Septum:  Deviated right anterior, severe left posterior  Mucosa:  clear  Turbinates: hypertrophic right inferior turbinate   Discharge:  none    Lips, tongue and oral cavity show tongue is midline, mobile, no lesions. Dentition: good, no malocclusion  Oral mucosa: moist  Tonsils: unremarkable  Oropharynx: normal-appearing mucosa  Hard and soft palates symmetrical and intact. Uvula midline. Gag reflex present  Nasopharynx: not seen    No facial redness, swelling or tenderness. Salivary glands not enlarged.     Neck symmetrical, supple  Cervical adenopathy: no palpable lymphadenopathy  Trachea midline    Chest equal and symmetrical expansion, no retractions    Extremities: no clubbing, cyanosis or edema  Gait steady  Skin: normal exposed surfaces  Cranial nerves grossly intact    Data:  All of the past medical history, past surgical history, family history, social history, allergies and current medications were reviewed. Nasal septal deviation, right inferior turbinate hypertrophy, bilateral middle turbinate martín bullosae        Assessment & Plan:        1. Nasal septal deviation    2. Hypertrophy of inferior nasal turbinate, right    3. Martín bullosa, bilateral middle turbinate    4. Obstructive sleep apnea    5. Daytime somnolence    6. Mouth breathing    May trial nasal saline irrigations and nasal steroids, but this will not correct the structural obstruction of his severely deviated nasal septum, turbinate hypertrophy and martín bullosa. We discussed potential surgical correction. He is not interested in surgery at this time. If he is interested in correcting his nasal airway, will schedule with Dr Aviva Tran for surgical evaluation and planning. Return if symptoms worsen or fail to improve.

## 2019-05-31 PROBLEM — J34.89 CONCHA BULLOSA: Status: ACTIVE | Noted: 2019-05-31

## 2019-05-31 PROBLEM — J34.2 NASAL SEPTAL DEVIATION: Status: ACTIVE | Noted: 2019-05-31

## 2019-06-19 ENCOUNTER — OFFICE VISIT (OUTPATIENT)
Dept: FAMILY MEDICINE CLINIC | Age: 39
End: 2019-06-19
Payer: COMMERCIAL

## 2019-06-19 VITALS
WEIGHT: 281.3 LBS | BODY MASS INDEX: 40.27 KG/M2 | HEIGHT: 70 IN | TEMPERATURE: 98.2 F | HEART RATE: 76 BPM | SYSTOLIC BLOOD PRESSURE: 116 MMHG | RESPIRATION RATE: 16 BRPM | DIASTOLIC BLOOD PRESSURE: 78 MMHG

## 2019-06-19 DIAGNOSIS — J22 LRTI (LOWER RESPIRATORY TRACT INFECTION): Primary | ICD-10-CM

## 2019-06-19 DIAGNOSIS — Z72.0 TOBACCO ABUSE: ICD-10-CM

## 2019-06-19 PROCEDURE — 99213 OFFICE O/P EST LOW 20 MIN: CPT | Performed by: FAMILY MEDICINE

## 2019-06-19 PROCEDURE — G8427 DOCREV CUR MEDS BY ELIG CLIN: HCPCS | Performed by: FAMILY MEDICINE

## 2019-06-19 PROCEDURE — G8417 CALC BMI ABV UP PARAM F/U: HCPCS | Performed by: FAMILY MEDICINE

## 2019-06-19 PROCEDURE — 4004F PT TOBACCO SCREEN RCVD TLK: CPT | Performed by: FAMILY MEDICINE

## 2019-06-19 RX ORDER — VARENICLINE TARTRATE 1 MG/1
1 TABLET, FILM COATED ORAL 2 TIMES DAILY
Qty: 60 TABLET | Refills: 2 | Status: SHIPPED | OUTPATIENT
Start: 2019-06-19 | End: 2020-08-04

## 2019-06-19 RX ORDER — PREDNISONE 20 MG/1
20 TABLET ORAL 2 TIMES DAILY
Qty: 10 TABLET | Refills: 0 | Status: SHIPPED | OUTPATIENT
Start: 2019-06-19 | End: 2019-06-24

## 2019-06-19 RX ORDER — DOXYCYCLINE HYCLATE 100 MG
100 TABLET ORAL 2 TIMES DAILY
Qty: 20 TABLET | Refills: 0 | Status: SHIPPED | OUTPATIENT
Start: 2019-06-19 | End: 2019-06-29

## 2019-06-19 RX ORDER — VARENICLINE TARTRATE 25 MG
KIT ORAL
Qty: 53 TABLET | Refills: 0 | Status: SHIPPED | OUTPATIENT
Start: 2019-06-19 | End: 2020-08-04

## 2019-06-19 RX ORDER — ALBUTEROL SULFATE 90 UG/1
2 AEROSOL, METERED RESPIRATORY (INHALATION) EVERY 6 HOURS PRN
Qty: 1 INHALER | Refills: 0 | Status: SHIPPED | OUTPATIENT
Start: 2019-06-19 | End: 2021-12-14

## 2019-06-19 ASSESSMENT — ENCOUNTER SYMPTOMS
SINUS PRESSURE: 1
WHEEZING: 1
GASTROINTESTINAL NEGATIVE: 1
SINUS PAIN: 1
COUGH: 1
CHEST TIGHTNESS: 1

## 2019-06-19 NOTE — PROGRESS NOTES
Visit Information    Have you changed or started any medications since your last visit including any over-the-counter medicines, vitamins, or herbal medicines? no   Are you having any side effects from any of your medications? -  no  Have you stopped taking any of your medications? Is so, why? -  no    Have you seen any other physician or provider since your last visit? No  Have you had any other diagnostic tests since your last visit? No  Have you been seen in the emergency room and/or had an admission to a hospital since we last saw you? No  Have you had your routine dental cleaning in the past 6 months? no    Have you activated your [employer] account? If not, what are your barriers?  Yes     Patient Care Team:  Meg Rubinstein, DO as PCP - General (Family Medicine)  Meg Rubinstein, DO as PCP - [facility] Provider  Liberty Walters MD as Consulting Physician (Orthopedic Surgery)    Medical History Review  Past Medical, Family, and Social History reviewed and does not contribute to the patient presenting condition    Health Maintenance   Topic Date Due    Pneumococcal 0-64 years Vaccine (1 of 1 - PPSV23) 01/08/1985    HIV screen  01/08/1994    DTaP/Tdap/Td vaccine (1 - Tdap) 01/08/1998    A1C test (Diabetic or Prediabetic)  09/28/2017    Flu vaccine (Season Ended) 09/01/2019    Lipid screen  11/11/2021

## 2019-06-19 NOTE — PROGRESS NOTES
Subjective:      Patient ID: Leyda Marie is a 35 y.o. male. HPI:    Chief Complaint   Patient presents with    Cough     productive cough    Chest Congestion    Sweats    Other     had a tick attached to his chin 2wks ago     Pt here for chest congestion, productive cough x 2-3 weeks. Started as allergy symptoms and now progressively getting worse. He does notice some sinus pressure and pain, + HA. No fevers, has had sweats. Pt states that he was bit by a tick on his chin 2 weeks ago. Was taking a shower and pulled a tick out of his beard. Patient Active Problem List   Diagnosis    Tobacco abuse    Dyslipidemia    Nasal obstruction    Deviated nasal septum    Chronic maxillary sinusitis    Hypertrophy of inferior nasal turbinate    Excessive cerumen in both ear canals    Shoulder pain    Right shoulder tendonitis    Obstructive sleep apnea    Morbid obesity with BMI of 40.0-44.9, adult (HCC)    Restless sleeper    Claustrophobia    Daytime somnolence    Atypical chest pain    Anxiety    IFG (impaired fasting glucose)    Metabolic syndrome    Vitamin D deficiency    Nasal septal deviation    Niecy bullosa, bilateral middle turbinate     Past Surgical History:   Procedure Laterality Date    APPENDECTOMY  1996    KNEE ARTHROSCOPY  01/2018    SINUS SURGERY  07/16/2014    RT. MAXILLARY ANTROSTOMY WITH TISSUE REMOVAL SEPTOPLASTY TURBINATE REDUCTION EXCISION AND BIOPSLY LESION MOUTH      Prior to Admission medications    Medication Sig Start Date End Date Taking? Authorizing Provider   CPAP Machine MISC by Does not apply route Please change CPAP pressure to 10 cm H20. 10/26/17  Yes Buster Granger PA-C         Review of Systems   Constitutional: Negative. Negative for fever. HENT: Positive for congestion, sinus pressure and sinus pain. Respiratory: Positive for cough, chest tightness and wheezing. Cardiovascular: Negative. Gastrointestinal: Negative. Musculoskeletal: Negative. All other systems reviewed and are negative. Objective:   Physical Exam   Constitutional: He is oriented to person, place, and time. He appears well-developed and well-nourished. HENT:   Head: Normocephalic and atraumatic. Right Ear: Tympanic membrane normal.   Left Ear: Tympanic membrane normal.   Mouth/Throat: Oropharynx is clear and moist and mucous membranes are normal.   Cardiovascular: Normal rate, regular rhythm and normal heart sounds. No murmur heard. Pulmonary/Chest: Effort normal. He has decreased breath sounds. He has wheezes. He has rhonchi. Abdominal: Soft. Bowel sounds are normal.   Musculoskeletal: He exhibits no edema. Neurological: He is alert and oriented to person, place, and time. Skin: Skin is warm and dry. Psychiatric: He has a normal mood and affect. His behavior is normal.   Nursing note and vitals reviewed. Assessment:       Diagnosis Orders   1. LRTI (lower respiratory tract infection)  doxycycline hyclate (VIBRA-TABS) 100 MG tablet    predniSONE (DELTASONE) 20 MG tablet    albuterol sulfate  (90 Base) MCG/ACT inhaler   2.  Tobacco abuse  varenicline (CHANTIX STARTING MONTH ALECIA) 0.5 MG X 11 & 1 MG X 42 tablet    varenicline (CHANTIX CONTINUING MONTH ALECIA) 1 MG tablet           Plan:      -  Orders above  -  Start Chantix, risks/benefits discussed (approx 3-5 minutes spent on tobacco cessation counseling)  -  RTO prn        Catrachita Medel DO

## 2019-10-31 DIAGNOSIS — M25.461 EFFUSION OF RIGHT KNEE: ICD-10-CM

## 2019-10-31 RX ORDER — MELOXICAM 15 MG/1
15 TABLET ORAL DAILY
Qty: 30 TABLET | Refills: 2 | Status: SHIPPED | OUTPATIENT
Start: 2019-10-31 | End: 2020-10-20 | Stop reason: SDUPTHER

## 2019-11-05 ENCOUNTER — OFFICE VISIT (OUTPATIENT)
Dept: FAMILY MEDICINE CLINIC | Age: 39
End: 2019-11-05
Payer: COMMERCIAL

## 2019-11-05 ENCOUNTER — TELEPHONE (OUTPATIENT)
Dept: FAMILY MEDICINE CLINIC | Age: 39
End: 2019-11-05

## 2019-11-05 VITALS
RESPIRATION RATE: 24 BRPM | TEMPERATURE: 99.8 F | BODY MASS INDEX: 40.69 KG/M2 | DIASTOLIC BLOOD PRESSURE: 84 MMHG | SYSTOLIC BLOOD PRESSURE: 118 MMHG | WEIGHT: 283.6 LBS | HEART RATE: 92 BPM

## 2019-11-05 DIAGNOSIS — R05.9 COUGH: ICD-10-CM

## 2019-11-05 DIAGNOSIS — R05.9 COUGH: Primary | ICD-10-CM

## 2019-11-05 DIAGNOSIS — Z72.0 TOBACCO ABUSE: ICD-10-CM

## 2019-11-05 DIAGNOSIS — J22 LRTI (LOWER RESPIRATORY TRACT INFECTION): Primary | ICD-10-CM

## 2019-11-05 PROCEDURE — G8417 CALC BMI ABV UP PARAM F/U: HCPCS | Performed by: NURSE PRACTITIONER

## 2019-11-05 PROCEDURE — 4004F PT TOBACCO SCREEN RCVD TLK: CPT | Performed by: NURSE PRACTITIONER

## 2019-11-05 PROCEDURE — G8484 FLU IMMUNIZE NO ADMIN: HCPCS | Performed by: NURSE PRACTITIONER

## 2019-11-05 PROCEDURE — G8427 DOCREV CUR MEDS BY ELIG CLIN: HCPCS | Performed by: NURSE PRACTITIONER

## 2019-11-05 PROCEDURE — 99213 OFFICE O/P EST LOW 20 MIN: CPT | Performed by: NURSE PRACTITIONER

## 2019-11-05 RX ORDER — DEXTROMETHORPHAN HYDROBROMIDE AND PROMETHAZINE HYDROCHLORIDE 15; 6.25 MG/5ML; MG/5ML
5 SYRUP ORAL 4 TIMES DAILY PRN
Qty: 120 ML | Refills: 0 | Status: SHIPPED | OUTPATIENT
Start: 2019-11-05 | End: 2019-11-12

## 2019-11-05 RX ORDER — PREDNISONE 50 MG/1
50 TABLET ORAL DAILY
Qty: 5 TABLET | Refills: 0 | Status: SHIPPED | OUTPATIENT
Start: 2019-11-05 | End: 2019-11-10

## 2019-11-05 RX ORDER — HYDROCODONE POLISTIREX AND CHLORPHENIRAMINE POLISTIREX 10; 8 MG/5ML; MG/5ML
5 SUSPENSION, EXTENDED RELEASE ORAL EVERY 12 HOURS PRN
Qty: 120 ML | Refills: 0 | Status: SHIPPED | OUTPATIENT
Start: 2019-11-05 | End: 2019-11-05

## 2019-11-05 RX ORDER — LEVOFLOXACIN 500 MG/1
500 TABLET, FILM COATED ORAL DAILY
Qty: 10 TABLET | Refills: 0 | Status: SHIPPED | OUTPATIENT
Start: 2019-11-05 | End: 2019-11-15

## 2019-11-05 ASSESSMENT — ENCOUNTER SYMPTOMS
NAUSEA: 0
ABDOMINAL PAIN: 0
CHEST TIGHTNESS: 1
RHINORRHEA: 0
SHORTNESS OF BREATH: 1
COUGH: 1

## 2020-06-01 NOTE — PROGRESS NOTES
Pack years: 36.00     Types: Cigarettes     Start date: 12/12/1995    Smokeless tobacco: Never Used   Substance Use Topics    Alcohol use: No     Alcohol/week: 0.0 standard drinks    Drug use: No       No Known Allergies    Current Outpatient Medications   Medication Sig Dispense Refill    meloxicam (MOBIC) 15 MG tablet Take 1 tablet by mouth daily 30 tablet 2    albuterol sulfate  (90 Base) MCG/ACT inhaler Inhale 2 puffs into the lungs every 6 hours as needed for Wheezing or Shortness of Breath 1 Inhaler 0    CPAP Machine MISC by Does not apply route Please change CPAP pressure to 10 cm H20. 1 each 0    varenicline (CHANTIX STARTING MONTH PAK) 0.5 MG X 11 & 1 MG X 42 tablet Take as directed on package. (Patient not taking: Reported on 11/5/2019) 53 tablet 0    varenicline (CHANTIX CONTINUING MONTH PAK) 1 MG tablet Take 1 tablet by mouth 2 times daily (Patient not taking: Reported on 11/5/2019) 60 tablet 2     No current facility-administered medications for this visit. Family History   Problem Relation Age of Onset    Cancer Father     Arthritis Maternal Grandmother     Diabetes Maternal Grandmother     Arthritis Maternal Grandfather     Diabetes Maternal Grandfather     Heart Disease Maternal Grandfather     Kidney Disease Maternal Grandfather     Arthritis Paternal Grandmother     Diabetes Paternal Grandmother     Stroke Paternal Grandmother     Arthritis Paternal Grandfather     Diabetes Paternal Grandfather         Review of Systems -   Review of Systems   Constitutional: Negative for activity change, appetite change, chills and fever. HENT: Negative for congestion and postnasal drip. Eyes: Negative. Respiratory: Negative for cough, chest tightness, shortness of breath, wheezing and stridor. Cardiovascular: Negative for chest pain and leg swelling. Gastrointestinal: Negative for diarrhea and nausea. Endocrine: Negative. Genitourinary: Negative. replacement machine. Their machine is obsolete and loud. - Will check Vit D and see if still low  - May be the cause of hypersomnia  - He  was advised to continue current positive airway pressure therapy with above described pressure. - He  advised to keep good compliance with current recommended pressure to get optimal results and clinical improvement  - Recommend 7-9 hours of sleep with PAP  - He was advised to call [employer] regarding supplies if needed.   -He call my office for earlier appointment if needed for worsening of sleep symptoms.   - He was instructed on weight loss  - Kasey Sosa was educated about my impression and plan. Patient verbalizesunderstanding.   We will see [employer] back in: 4 months with download    Information added by my medical assistant/LPN was reviewed today         Sandra Randall PA-C, MPAS  6/2/2020

## 2020-06-02 ENCOUNTER — NURSE ONLY (OUTPATIENT)
Dept: LAB | Age: 40
End: 2020-06-02

## 2020-06-02 ENCOUNTER — OFFICE VISIT (OUTPATIENT)
Dept: PULMONOLOGY | Age: 40
End: 2020-06-02
Payer: COMMERCIAL

## 2020-06-02 VITALS
TEMPERATURE: 99.2 F | HEIGHT: 70 IN | OXYGEN SATURATION: 97 % | DIASTOLIC BLOOD PRESSURE: 88 MMHG | WEIGHT: 290.4 LBS | HEART RATE: 75 BPM | SYSTOLIC BLOOD PRESSURE: 132 MMHG | BODY MASS INDEX: 41.57 KG/M2

## 2020-06-02 LAB — VITAMIN D 25-HYDROXY: 13 NG/ML (ref 30–100)

## 2020-06-02 PROCEDURE — G8427 DOCREV CUR MEDS BY ELIG CLIN: HCPCS | Performed by: PHYSICIAN ASSISTANT

## 2020-06-02 PROCEDURE — G8417 CALC BMI ABV UP PARAM F/U: HCPCS | Performed by: PHYSICIAN ASSISTANT

## 2020-06-02 PROCEDURE — 99214 OFFICE O/P EST MOD 30 MIN: CPT | Performed by: PHYSICIAN ASSISTANT

## 2020-06-02 PROCEDURE — 4004F PT TOBACCO SCREEN RCVD TLK: CPT | Performed by: PHYSICIAN ASSISTANT

## 2020-06-02 ASSESSMENT — ENCOUNTER SYMPTOMS
WHEEZING: 0
BACK PAIN: 0
STRIDOR: 0
CHEST TIGHTNESS: 0
COUGH: 0
ALLERGIC/IMMUNOLOGIC NEGATIVE: 1
DIARRHEA: 0
NAUSEA: 0
EYES NEGATIVE: 1
SHORTNESS OF BREATH: 0

## 2020-06-03 ENCOUNTER — TELEPHONE (OUTPATIENT)
Dept: PULMONOLOGY | Age: 40
End: 2020-06-03

## 2020-06-18 ENCOUNTER — TELEPHONE (OUTPATIENT)
Dept: PULMONOLOGY | Age: 40
End: 2020-06-18

## 2020-07-02 ENCOUNTER — TELEPHONE (OUTPATIENT)
Dept: PULMONOLOGY | Age: 40
End: 2020-07-02

## 2020-07-02 NOTE — TELEPHONE ENCOUNTER
Kirit Erwin called stating his facial mask does not fit well, can't get a seal and it opens and closes his mouth. You had did a pressure change. And took off ramp. So he went back to Nasal mask, but now he feels like he is suffocating. Takes him .5 hour to .75 hr to fall asleep. AHI is high. He said his pressures at 5 are too low. Maybe he suggested 7-10.  DL in EPIC

## 2020-07-02 NOTE — TELEPHONE ENCOUNTER
AHI on down;load is not high at all. But will change pressure to 7-10. He will need to work with DME regarding mask issues. He also needs to change the setting in the PAP for nasal mask or FFM depending on which one he is using. It will make it feel better.

## 2020-08-04 ENCOUNTER — OFFICE VISIT (OUTPATIENT)
Dept: FAMILY MEDICINE CLINIC | Age: 40
End: 2020-08-04
Payer: COMMERCIAL

## 2020-08-04 VITALS
RESPIRATION RATE: 18 BRPM | SYSTOLIC BLOOD PRESSURE: 132 MMHG | TEMPERATURE: 97.8 F | HEART RATE: 76 BPM | WEIGHT: 289.3 LBS | DIASTOLIC BLOOD PRESSURE: 80 MMHG | BODY MASS INDEX: 41.51 KG/M2

## 2020-08-04 PROCEDURE — G8417 CALC BMI ABV UP PARAM F/U: HCPCS | Performed by: FAMILY MEDICINE

## 2020-08-04 PROCEDURE — 4004F PT TOBACCO SCREEN RCVD TLK: CPT | Performed by: FAMILY MEDICINE

## 2020-08-04 PROCEDURE — G8427 DOCREV CUR MEDS BY ELIG CLIN: HCPCS | Performed by: FAMILY MEDICINE

## 2020-08-04 PROCEDURE — 99214 OFFICE O/P EST MOD 30 MIN: CPT | Performed by: FAMILY MEDICINE

## 2020-08-04 SDOH — ECONOMIC STABILITY: TRANSPORTATION INSECURITY
IN THE PAST 12 MONTHS, HAS THE LACK OF TRANSPORTATION KEPT YOU FROM MEDICAL APPOINTMENTS OR FROM GETTING MEDICATIONS?: NO

## 2020-08-04 SDOH — ECONOMIC STABILITY: FOOD INSECURITY: WITHIN THE PAST 12 MONTHS, YOU WORRIED THAT YOUR FOOD WOULD RUN OUT BEFORE YOU GOT MONEY TO BUY MORE.: NEVER TRUE

## 2020-08-04 SDOH — ECONOMIC STABILITY: TRANSPORTATION INSECURITY
IN THE PAST 12 MONTHS, HAS LACK OF TRANSPORTATION KEPT YOU FROM MEETINGS, WORK, OR FROM GETTING THINGS NEEDED FOR DAILY LIVING?: NO

## 2020-08-04 SDOH — ECONOMIC STABILITY: FOOD INSECURITY: WITHIN THE PAST 12 MONTHS, THE FOOD YOU BOUGHT JUST DIDN'T LAST AND YOU DIDN'T HAVE MONEY TO GET MORE.: NEVER TRUE

## 2020-08-04 SDOH — ECONOMIC STABILITY: INCOME INSECURITY: HOW HARD IS IT FOR YOU TO PAY FOR THE VERY BASICS LIKE FOOD, HOUSING, MEDICAL CARE, AND HEATING?: NOT HARD AT ALL

## 2020-08-04 ASSESSMENT — ENCOUNTER SYMPTOMS
GASTROINTESTINAL NEGATIVE: 1
RESPIRATORY NEGATIVE: 1

## 2020-08-04 ASSESSMENT — PATIENT HEALTH QUESTIONNAIRE - PHQ9
SUM OF ALL RESPONSES TO PHQ QUESTIONS 1-9: 0
SUM OF ALL RESPONSES TO PHQ QUESTIONS 1-9: 0
1. LITTLE INTEREST OR PLEASURE IN DOING THINGS: 0
2. FEELING DOWN, DEPRESSED OR HOPELESS: 0
SUM OF ALL RESPONSES TO PHQ9 QUESTIONS 1 & 2: 0

## 2020-08-04 NOTE — PROGRESS NOTES
Subjective:      Patient ID: Aleena Arriola is a 38 y.o. male. HPI:    Chief Complaint   Patient presents with    Cyst     on lower back - pain and draining for the past 3 months    Swelling     left from elbow down to fingers - on and off for 2 years - notices it when it is warm out    Joint Swelling     fluid on bilateral knees    Other     discuss the need for a colonoscopy    Mole     on neck and inner right thigh     Pt here with multiple concerns today. Pt here for cyst on low back region for the last 3 mos. Per pt, started with 1 hole that was draining but now has another one. Constantly draining. Some redness and discomfort. Pt also c/o multiple joint pains for years. Has issues with bilateral knee pain and swelling. Hx of arthroscopic surgery on the right knee but now having pain in the left knee. Also c/o left arm swelling with walking, this has been going on for the last 2 yrs. The swelling will resolve once done walking. No dark or painful extremity. No cold extremity. Pt also concern about     Patient Active Problem List   Diagnosis    Tobacco abuse    Dyslipidemia    Nasal obstruction    Deviated nasal septum    Chronic maxillary sinusitis    Hypertrophy of inferior nasal turbinate    Excessive cerumen in both ear canals    Shoulder pain    Right shoulder tendonitis    Obstructive sleep apnea    Morbid obesity with BMI of 40.0-44.9, adult (HCC)    Restless sleeper    Claustrophobia    Daytime somnolence    Atypical chest pain    Anxiety    IFG (impaired fasting glucose)    Metabolic syndrome    Vitamin D deficiency    Nasal septal deviation    Niecy bullosa, bilateral middle turbinate     Past Surgical History:   Procedure Laterality Date    APPENDECTOMY  1996    KNEE ARTHROSCOPY  01/2018    SINUS SURGERY  07/16/2014    RT.  MAXILLARY ANTROSTOMY WITH TISSUE REMOVAL SEPTOPLASTY TURBINATE REDUCTION EXCISION AND BIOPSLY LESION MOUTH      Prior to Admission medications    Medication Sig Start Date End Date Taking? Authorizing Provider   CPAP Machine MISC by Does not apply route Please change CPAP pressure to auto 7-10 cm H20. 7/2/20  Yes Aron Gu PA-C   Cholecalciferol 50 MCG (2000 UT) CAPS Take 50 mcg by mouth daily 6/3/20  Yes Aron Gu PA-C   meloxicam [facility]) 15 MG tablet Take 1 tablet by mouth daily 10/31/19  Yes Howard Hammonds DO   albuterol sulfate  (90 Base) MCG/ACT inhaler Inhale 2 puffs into the lungs every 6 hours as needed for Wheezing or Shortness of Breath 6/19/19  Yes Howard Hammonds, DO   varenicline (CHANTIX STARTING MONTH PAK) 0.5 MG X 11 & 1 MG X 42 tablet Take as directed on package. Patient not taking: Reported on 11/5/2019 6/19/19   Howard Hammonds DO   varenicline [facility] CONTINUING MONTH ALECIA) 1 MG tablet Take 1 tablet by mouth 2 times daily  Patient not taking: Reported on 11/5/2019 6/19/19   Howard Hammonds DO         Review of Systems   Constitutional: Negative. HENT: Negative. Respiratory: Negative. Cardiovascular: Negative. Gastrointestinal: Negative. Genitourinary: Positive for difficulty urinating. Musculoskeletal: Positive for arthralgias (bl knee pain) and joint swelling (left arm, only  when walking). Skin: Positive for wound (buttock). Moles of concern   All other systems reviewed and are negative. Objective:   Physical Exam  Vitals signs and nursing note reviewed. Constitutional:       Appearance: He is well-developed. HENT:      Head: Normocephalic and atraumatic. Right Ear: Tympanic membrane normal.      Left Ear: Tympanic membrane normal.   Cardiovascular:      Rate and Rhythm: Normal rate and regular rhythm. Heart sounds: Normal heart sounds. No murmur. Pulmonary:      Effort: Pulmonary effort is normal.      Breath sounds: Normal breath sounds.    Abdominal:      General: Bowel sounds are normal.      Palpations: Abdomen is soft.   Skin:     General: Skin is warm and dry. Comments: Multiple skin tags and atypical moles on back     Neurological:      Mental Status: He is alert and oriented to person, place, and time. Psychiatric:         Behavior: Behavior normal.         Assessment:       Diagnosis Orders   1. Non-healing skin lesion  Leighton Benítez MD, Plastic Surgery, [facility]   2. Atypical mole  Leighton Benítez MD, Plastic Surgery, [facility]   3. Polyarthralgia  CBC Auto Differential    Cyclic Citrul Peptide Antibody, IgG    JESUS Screen with Reflex    C-Reactive Protein    Sedimentation Rate    Rheumatoid Factor    Anti-DNA Antibody, Double-Stranded   4. Left arm swelling     5. Tobacco abuse     6. Morbid obesity with BMI of 40.0-44.9, adult ([facility] [address]     7. IFG (impaired fasting glucose)  Comprehensive Metabolic Panel    Hemoglobin A1C   8. Pure hypercholesterolemia  Lipid Panel    Comprehensive Metabolic Panel    TSH with Reflex   9.  Urinary hesitancy  PSA, Prostatic Specific Antigen           Plan:      -  Multiple concerns today, each addressed at length  -  Orders above  -  RTO prn for now, further recommendations after seeing labs        Ron Villanueva DO

## 2020-08-04 NOTE — PATIENT INSTRUCTIONS
You may receive a survey regarding the care you received during your visit. Your input is valuable to us. We encourage you to complete and return your survey. We hope you will choose us in the future for your healthcare needs. Tobacco Cessation Programs     Telephonic behavior modification  Meseret Ballard ([phone])   Counseling service for those who are ready to quit using tobacco.     Available for uninsured [place] residents, [place] recipients, pregnant women, or patients whose health plans or employers are members of the [address] [place] [address] behavior modification   http://[place]. Quitlogix. org   Online support program to help patients through each step of the quitting process. Available 24 hours a day 7 days a week. Provides up to date researched based tool, step-by-step guides, and motivational messages. Online behavior modification   www.lungusa.org/stop-smoking/how-to-quit   HelpLine: [facility] (902-1758)   Email questions to:  Kathy@[employer]. org    Website offers resources to help tobacco users figure out their reasons for quitting and then take the big step of quitting for good. Hypnosis   Location: [facility] [place], [place]   Contact: Barbara Medeiros, PhD at [phone]   Hypnosis for tobacco cessation   Cost $225 for the initial session and $175 for each session afterwards. Most patients require 6-8 sessions. There is the option to submit through the patients insurance. Hypnosis and behavior modification   Location: [place] [address],  [facility] [address] [place], [place]   Contact: Amalia Meza, PhD at [phone]  [facility] Counseling and hypnosis for nicotine addition   Cost: For uninsured patients:  Please call above phone number  Cost for insured patients depends on patients insurance plan.     Behavior modification   Location: [facility], [address] [place], [address]: [address] four one-on-one appointments between the patient and a respiratory therapist.  The four appointments span over three weeks. The respiratory therapist schedules one of the appointments to occur 48 hours after the patients quit date.  Cost $100 total for the four sessions.   Tobacco cessation products are not included in the cost and are not provided by [facility], [place], operated by [facility].     Xiomara Gonsales

## 2020-08-05 ENCOUNTER — HOSPITAL ENCOUNTER (OUTPATIENT)
Age: 40
Discharge: HOME | End: 2020-08-05
Payer: COMMERCIAL

## 2020-08-05 LAB
ALBUMIN SERPL-MCNC: 4.6 G/DL (ref 3.5–5.1)
ALP BLD-CCNC: 104 U/L (ref 38–126)
ALT SERPL-CCNC: 76 U/L (ref 11–66)
ANION GAP SERPL CALCULATED.3IONS-SCNC: 15 MEQ/L (ref 8–16)
AST SERPL-CCNC: 43 U/L (ref 5–40)
AVERAGE GLUCOSE: 117 MG/DL (ref 70–126)
BASOPHILS # BLD: 0.8 %
BASOPHILS ABSOLUTE: 0.1 THOU/MM3 (ref 0–0.1)
BILIRUB SERPL-MCNC: 0.5 MG/DL (ref 0.3–1.2)
BUN BLDV-MCNC: 11 MG/DL (ref 7–22)
C-REACTIVE PROTEIN: 0.6 MG/DL (ref 0–1)
CALCIUM SERPL-MCNC: 9.8 MG/DL (ref 8.5–10.5)
CHLORIDE BLD-SCNC: 100 MEQ/L (ref 98–111)
CHOLESTEROL, TOTAL: 235 MG/DL (ref 100–199)
CO2: 25 MEQ/L (ref 23–33)
CREAT SERPL-MCNC: 0.9 MG/DL (ref 0.4–1.2)
EOSINOPHIL # BLD: 2.6 %
EOSINOPHILS ABSOLUTE: 0.3 THOU/MM3 (ref 0–0.4)
ERYTHROCYTE [DISTWIDTH] IN BLOOD BY AUTOMATED COUNT: 13.1 % (ref 11.5–14.5)
ERYTHROCYTE [DISTWIDTH] IN BLOOD BY AUTOMATED COUNT: 44.1 FL (ref 35–45)
GFR SERPL CREATININE-BSD FRML MDRD: > 90 ML/MIN/1.73M2
GLUCOSE BLD-MCNC: 107 MG/DL (ref 70–108)
HBA1C MFR BLD: 5.9 % (ref 4.4–6.4)
HCT VFR BLD CALC: 50.3 % (ref 42–52)
HDLC SERPL-MCNC: 34 MG/DL
HEMOGLOBIN: 17.1 GM/DL (ref 14–18)
IMMATURE GRANS (ABS): 0.1 THOU/MM3 (ref 0–0.07)
IMMATURE GRANULOCYTES: 0.8 %
LDL CHOLESTEROL CALCULATED: ABNORMAL MG/DL
LYMPHOCYTES # BLD: 26.6 %
LYMPHOCYTES ABSOLUTE: 3.2 THOU/MM3 (ref 1–4.8)
MCH RBC QN AUTO: 31.6 PG (ref 26–33)
MCHC RBC AUTO-ENTMCNC: 34 GM/DL (ref 32.2–35.5)
MCV RBC AUTO: 93 FL (ref 80–94)
MONOCYTES # BLD: 5.8 %
MONOCYTES ABSOLUTE: 0.7 THOU/MM3 (ref 0.4–1.3)
NUCLEATED RED BLOOD CELLS: 0 /100 WBC
PLATELET # BLD: 264 THOU/MM3 (ref 130–400)
PMV BLD AUTO: 9.7 FL (ref 9.4–12.4)
POTASSIUM SERPL-SCNC: 4 MEQ/L (ref 3.5–5.2)
PROSTATE SPECIFIC ANTIGEN: 1.18 NG/ML (ref 0–1)
RBC # BLD: 5.41 MILL/MM3 (ref 4.7–6.1)
RHEUMATOID FACTOR: < 10 IU/ML (ref 0–13)
SEDIMENTATION RATE, ERYTHROCYTE: 13 MM/HR (ref 0–10)
SEG NEUTROPHILS: 63.4 %
SEGMENTED NEUTROPHILS ABSOLUTE COUNT: 7.6 THOU/MM3 (ref 1.8–7.7)
SODIUM BLD-SCNC: 140 MEQ/L (ref 135–145)
TOTAL PROTEIN: 7.8 G/DL (ref 6.1–8)
TRIGL SERPL-MCNC: 491 MG/DL (ref 0–199)
TSH SERPL DL<=0.05 MIU/L-ACNC: 2.32 UIU/ML (ref 0.4–4.2)
WBC # BLD: 12 THOU/MM3 (ref 4.8–10.8)

## 2020-08-05 PROCEDURE — 86140 C-REACTIVE PROTEIN: CPT

## 2020-08-05 PROCEDURE — 80053 COMPREHEN METABOLIC PANEL: CPT

## 2020-08-05 PROCEDURE — 86225 DNA ANTIBODY NATIVE: CPT

## 2020-08-05 PROCEDURE — 80061 LIPID PANEL: CPT

## 2020-08-05 PROCEDURE — 85651 RBC SED RATE NONAUTOMATED: CPT

## 2020-08-05 PROCEDURE — 85025 COMPLETE CBC W/AUTO DIFF WBC: CPT

## 2020-08-05 PROCEDURE — 86430 RHEUMATOID FACTOR TEST QUAL: CPT

## 2020-08-05 PROCEDURE — 86200 CCP ANTIBODY: CPT

## 2020-08-05 PROCEDURE — 36415 COLL VENOUS BLD VENIPUNCTURE: CPT

## 2020-08-05 PROCEDURE — 83036 HEMOGLOBIN GLYCOSYLATED A1C: CPT

## 2020-08-05 PROCEDURE — 84153 ASSAY OF PSA TOTAL: CPT

## 2020-08-05 PROCEDURE — 84443 ASSAY THYROID STIM HORMONE: CPT

## 2020-08-05 PROCEDURE — 86038 ANTINUCLEAR ANTIBODIES: CPT

## 2020-08-06 ENCOUNTER — OFFICE VISIT (OUTPATIENT)
Dept: SURGERY | Age: 40
End: 2020-08-06
Payer: COMMERCIAL

## 2020-08-06 ENCOUNTER — TELEPHONE (OUTPATIENT)
Dept: SURGERY | Age: 40
End: 2020-08-06

## 2020-08-06 VITALS
TEMPERATURE: 97.5 F | WEIGHT: 291 LBS | BODY MASS INDEX: 41.66 KG/M2 | DIASTOLIC BLOOD PRESSURE: 86 MMHG | RESPIRATION RATE: 18 BRPM | OXYGEN SATURATION: 98 % | HEIGHT: 70 IN | HEART RATE: 86 BPM | SYSTOLIC BLOOD PRESSURE: 136 MMHG

## 2020-08-06 VITALS
WEIGHT: 291 LBS | SYSTOLIC BLOOD PRESSURE: 155 MMHG | BODY MASS INDEX: 41.66 KG/M2 | TEMPERATURE: 97.2 F | HEIGHT: 70 IN | HEART RATE: 72 BPM | DIASTOLIC BLOOD PRESSURE: 100 MMHG

## 2020-08-06 PROCEDURE — 4004F PT TOBACCO SCREEN RCVD TLK: CPT | Performed by: SURGERY

## 2020-08-06 PROCEDURE — 99205 OFFICE O/P NEW HI 60 MIN: CPT | Performed by: SURGERY

## 2020-08-06 PROCEDURE — 99202 OFFICE O/P NEW SF 15 MIN: CPT | Performed by: SURGERY

## 2020-08-06 PROCEDURE — G8427 DOCREV CUR MEDS BY ELIG CLIN: HCPCS | Performed by: SURGERY

## 2020-08-06 PROCEDURE — G8417 CALC BMI ABV UP PARAM F/U: HCPCS | Performed by: SURGERY

## 2020-08-06 RX ORDER — LIDOCAINE HYDROCHLORIDE AND EPINEPHRINE BITARTRATE 20; .01 MG/ML; MG/ML
3 INJECTION, SOLUTION SUBCUTANEOUS ONCE
Status: COMPLETED | OUTPATIENT
Start: 2020-08-06 | End: 2020-08-06

## 2020-08-06 RX ADMIN — LIDOCAINE HYDROCHLORIDE AND EPINEPHRINE BITARTRATE 3 ML: 20; .01 INJECTION, SOLUTION SUBCUTANEOUS at 09:30

## 2020-08-06 ASSESSMENT — ENCOUNTER SYMPTOMS
SINUS PRESSURE: 0
PHOTOPHOBIA: 0
TROUBLE SWALLOWING: 0
SHORTNESS OF BREATH: 0
ABDOMINAL PAIN: 0
COUGH: 0
FACIAL SWELLING: 0
COLOR CHANGE: 1
SINUS PAIN: 0

## 2020-08-06 ASSESSMENT — VISUAL ACUITY: OU: 1

## 2020-08-06 NOTE — PROGRESS NOTES
(01/2018). Family History  family history includes Arthritis in his maternal grandfather, maternal grandmother, paternal grandfather, and paternal grandmother; Cancer in his father; Diabetes in his maternal grandfather, maternal grandmother, paternal grandfather, and paternal grandmother; Heart Disease in his maternal grandfather; Kidney Disease in his maternal grandfather; Stroke in his paternal grandmother. Social History   reports that he has been smoking cigarettes. He started smoking about 24 years ago. He has a 36.00 pack-year smoking history. He has never used smokeless tobacco. He reports that he does not drink alcohol or use drugs. Health Maintenance:    Health Maintenance   Topic Date Due    Pneumococcal 0-64 years Vaccine (1 of 1 - PPSV23) 01/08/1985    HIV screen  01/08/1994    DTaP/Tdap/Td vaccine (1 - Tdap) 01/08/1998    Flu vaccine (1) 09/01/2020    A1C test (Diabetic or Prediabetic)  08/05/2021    Lipid screen  08/05/2025    Hepatitis A vaccine  Aged Out    Hepatitis B vaccine  Aged Out    Hib vaccine  Aged Out    Meningococcal (ACWY) vaccine  Aged Out       Subjective:      Review of Systems   Constitutional: Negative for activity change, appetite change, chills, fatigue, fever and unexpected weight change. HENT: Negative for dental problem, facial swelling, nosebleeds, sinus pressure, sinus pain and trouble swallowing. Eyes: Negative for photophobia and visual disturbance. Respiratory: Negative for cough and shortness of breath. Cardiovascular: Negative for chest pain and leg swelling. Gastrointestinal: Negative for abdominal pain. Endocrine: Negative for cold intolerance and heat intolerance. Musculoskeletal: Negative for neck pain and neck stiffness. Skin: Positive for color change. Negative for pallor, rash and wound. Neurological: Negative for dizziness, facial asymmetry, light-headedness, numbness and headaches.    Hematological: Does not bruise/bleed easily. Objective:     BP (!) 155/100   Pulse 72   Temp 97.2 °F (36.2 °C)   Ht 5' 10\" (1.778 m)   Wt 291 lb (132 kg)   BMI 41.75 kg/m²     Physical Exam  Vitals signs and nursing note reviewed. Constitutional:       General: He is awake. He is not in acute distress. Appearance: Normal appearance. He is obese. HENT:      Head: Normocephalic and atraumatic. Jaw: There is normal jaw occlusion. Right Ear: Hearing and external ear normal.      Left Ear: Hearing and external ear normal.      Nose: Nose normal.      Mouth/Throat:      Lips: Pink. Mouth: Mucous membranes are moist.      Pharynx: Oropharynx is clear. Eyes:      General: Lids are normal. Vision grossly intact. Right eye: No discharge. Left eye: No discharge. Extraocular Movements: Extraocular movements intact. Conjunctiva/sclera: Conjunctivae normal.      Pupils: Pupils are equal, round, and reactive to light. Neck:      Musculoskeletal: Full passive range of motion without pain, normal range of motion and neck supple. Thyroid: No thyromegaly. Trachea: Trachea and phonation normal. No tracheal deviation. Cardiovascular:      Rate and Rhythm: Normal rate. Pulses: Normal pulses. No decreased pulses. Pulmonary:      Effort: Pulmonary effort is normal. No respiratory distress. Abdominal:      General: Abdomen is flat. There is no distension. Palpations: Abdomen is soft. Tenderness: There is no abdominal tenderness. Musculoskeletal: Normal range of motion. General: No deformity. Skin:     General: Skin is warm and dry. Capillary Refill: Capillary refill takes 2 to 3 seconds. Findings: Lesion present. No erythema or rash. Neurological:      General: No focal deficit present. Mental Status: He is alert and oriented to person, place, and time. Mental status is at baseline. Sensory: No sensory deficit.    Psychiatric:         Mood and Affect: Mood normal.         Behavior: Behavior normal. Behavior is cooperative. Thought Content: Thought content normal.       Derm Physical Exam       Assessment/Plan:     1. Pilonidal cyst -I have referred the patient to general surgery and I spoke with Dr. Queta Smith   and he will see the patient in his clinic today  2. Neoplasm of uncertain behavior of the skin involving the left lateral mid back that appears                 dysplastic. I will perform a punch biopsy of this lesion today in the office. The other  pigmented lesion of the upper mid back along the midline I have instructed the patient  to continue observation for increased in size, change in appearance, change in color,  or any symptoms of increased pain or tenderness involving this lesion. Procedure: After informed consent was obtained listing risk and benefits of the procedure and discussed with patient the plan for punch biopsy of the pigmented lesion of the left mid lateral back, he agreed to proceed. The area was then prepped and draped in usual aseptic manner and local infiltration with mixture 1% lidocaine with epinephrine was then infiltrated to the planned site of biopsy. A 3 mm punch biopsy was taken of this area at the border of pigmentation and normal-appearing skin. This was placed in formalin and sent to pathology for permanent section. Closure of the punch biopsy site was performed with interrupted 4-0 nylon suture followed by antibiotic and a dressing. The patient tolerated well. The encounter diagnosis was Neoplasm of uncertain behavior of skin.   Orders Placed This Encounter   Procedures    Surgical Pathology     Left mid back     Standing Status:   Future     Standing Expiration Date:   8/6/2021     Order Specific Question:   PREVIOUS BIOPSY     Answer:   No     Order Specific Question:   PREOP DIAGNOSIS     Answer:   neoplasm     Order Specific Question:   FROZEN SECTION - NO OR YES/SPECIMEN     Answer: No        No follow-ups on file. Patient given educational materials - see patient instructions. Discussed use, benefit, and side effects of prescribed medications. All patient questions answered. Pt voiced understanding. Reviewed health maintenance.        Electronically signed Ray Garcia MD on 8/6/2020 at 10:48 AM EDT

## 2020-08-07 LAB — CYCLIC CITRULLINATED PEPTIDE ANTIBODY IGG: < 1.5 U/ML

## 2020-08-08 LAB
ANA SCREEN: NORMAL
DSDNA ANTIBODY: NORMAL

## 2020-08-10 ENCOUNTER — TELEMEDICINE (OUTPATIENT)
Dept: FAMILY MEDICINE CLINIC | Age: 40
End: 2020-08-10
Payer: COMMERCIAL

## 2020-08-10 PROCEDURE — 99214 OFFICE O/P EST MOD 30 MIN: CPT | Performed by: FAMILY MEDICINE

## 2020-08-10 PROCEDURE — G8428 CUR MEDS NOT DOCUMENT: HCPCS | Performed by: FAMILY MEDICINE

## 2020-08-10 NOTE — PROGRESS NOTES
Subjective:      Patient ID: Quique Snyder is a 38 y.o. male. HPI:    Chief Complaint   Patient presents with    Abnormal Lab     Quique Snyder (:  1980) has requested an audio/video evaluation for the following concern(s):    Pt presents today to review labs. Overall look ok. TG's > 400. LFT's elevated. Lab Results   Component Value Date    LABA1C 5.9 2020     Wt Readings from Last 3 Encounters:   20 291 lb (132 kg)   20 291 lb (132 kg)   20 289 lb 4.8 oz (131.2 kg)     Rheum work up negative. Patient Active Problem List   Diagnosis    Tobacco abuse    Dyslipidemia    Nasal obstruction    Deviated nasal septum    Chronic maxillary sinusitis    Hypertrophy of inferior nasal turbinate    Excessive cerumen in both ear canals    Shoulder pain    Right shoulder tendonitis    Obstructive sleep apnea    Morbid obesity with BMI of 40.0-44.9, adult (HCC)    Restless sleeper    Claustrophobia    Daytime somnolence    Atypical chest pain    Anxiety    IFG (impaired fasting glucose)    Metabolic syndrome    Vitamin D deficiency    Nasal septal deviation    Niecy bullosa, bilateral middle turbinate     Past Surgical History:   Procedure Laterality Date    APPENDECTOMY  1996    KNEE ARTHROSCOPY  2018    SINUS SURGERY  2014    RT. MAXILLARY ANTROSTOMY WITH TISSUE REMOVAL SEPTOPLASTY TURBINATE REDUCTION EXCISION AND BIOPSLY LESION MOUTH      Prior to Admission medications    Medication Sig Start Date End Date Taking? Authorizing Provider   CPAP Machine MISC by Does not apply route Please change CPAP pressure to auto 7-10 cm H20.   Patient not taking: Reported on 2020   Jorden Taveras PA-C   Cholecalciferol 50 MCG (9982 UT) CAPS Take 50 mcg by mouth daily 6/3/20   Jorden Taveras PA-C   meloxicam [facility]) 15 MG tablet Take 1 tablet by mouth daily 10/31/19   Tao Tan,    albuterol sulfate  (90 Base) MCG/ACT inhaler Inhale 2 puffs into the lungs every 6 hours as needed for Wheezing or Shortness of Breath 6/19/19   Romina Allan, DO         Review of Systems   Constitutional: Negative. HENT: Negative. Respiratory: Negative. Cardiovascular: Negative. Gastrointestinal: Negative. Musculoskeletal: Negative. All other systems reviewed and are negative. Objective:   Physical Exam  Constitutional:       General: He is not in acute distress. Appearance: Normal appearance. He is well-developed. He is not ill-appearing. HENT:      Head: Normocephalic and atraumatic. Right Ear: External ear normal.      Left Ear: External ear normal.   Eyes:      Conjunctiva/sclera: Conjunctivae normal.   Pulmonary:      Effort: Pulmonary effort is normal. No respiratory distress. Skin:     Findings: No rash (on exposed surfaces). Neurological:      Mental Status: He is alert and oriented to person, place, and time. Psychiatric:         Mood and Affect: Mood normal.         Behavior: Behavior normal.         Thought Content: Thought content normal.         Judgment: Judgment normal.         Assessment:       Diagnosis Orders   1. Dyslipidemia  Lipid Panel   2. IFG (impaired fasting glucose)     3. GRIFFIN (nonalcoholic steatohepatitis)  Hepatic Function Panel   4. Polyarthralgia     5. Morbid obesity with BMI of 40.0-44.9, adult ([facility] [address]             Plan:      -  Chronic medical problems stable  -  Labs reviewed, ok overall other than TG's and LFT's  -  Dramatic dietary changes encouraged  -  Weight loss a must  -  Continue current medications  -  Follow up with specialists as scheduled  -  Recheck labs 6 mos  -  RTO 6 mos    Due to this being a TeleHealth encounter (During [place] [address] public health emergency), evaluation of the following organ systems was limited: Vitals/Constitutional/EENT/Resp/CV/GI//MS/Neuro/Skin/Heme-Lymph-Imm.   Pursuant to the emergency declaration under the [address] Emergencies Act, 1135 waiver authority and the Coronavirus Preparedness and Response Supplemental Appropriations Act, this Virtual Visit was conducted with patient's (and/or legal guardian's) consent, to reduce the patient's risk of exposure to COVID-19 and provide necessary medical care. The patient (and/or legal guardian) has also been advised to contact this office for worsening conditions or problems, and seek emergency medical treatment and/or call 911 if deemed necessary. Patient identification was verified at the start of the visit: Yes    Total time spent for this encounter: Not billed by time    Services were provided through a video synchronous discussion virtually to substitute for in-person clinic visit. Patient and provider were located at their individual homes.           Dixie aPrra, DO

## 2020-08-11 ASSESSMENT — ENCOUNTER SYMPTOMS
RESPIRATORY NEGATIVE: 1
GASTROINTESTINAL NEGATIVE: 1

## 2020-08-12 ENCOUNTER — OFFICE VISIT (OUTPATIENT)
Dept: SURGERY | Age: 40
End: 2020-08-12
Payer: COMMERCIAL

## 2020-08-12 VITALS — WEIGHT: 288.2 LBS | HEIGHT: 70 IN | BODY MASS INDEX: 41.26 KG/M2 | TEMPERATURE: 97.2 F

## 2020-08-12 PROCEDURE — G8417 CALC BMI ABV UP PARAM F/U: HCPCS | Performed by: SURGERY

## 2020-08-12 PROCEDURE — G8428 CUR MEDS NOT DOCUMENT: HCPCS | Performed by: SURGERY

## 2020-08-12 PROCEDURE — 99214 OFFICE O/P EST MOD 30 MIN: CPT | Performed by: SURGERY

## 2020-08-12 PROCEDURE — 4004F PT TOBACCO SCREEN RCVD TLK: CPT | Performed by: SURGERY

## 2020-08-19 ASSESSMENT — ENCOUNTER SYMPTOMS
SORE THROAT: 0
VOICE CHANGE: 0
EYE DISCHARGE: 0
ALLERGIC/IMMUNOLOGIC NEGATIVE: 1
WHEEZING: 0
EYE REDNESS: 0
SINUS PRESSURE: 0
BACK PAIN: 0
GASTROINTESTINAL NEGATIVE: 1
EYES NEGATIVE: 1
CHEST TIGHTNESS: 0
APNEA: 1
STRIDOR: 0
EYE ITCHING: 0
CHOKING: 0
SHORTNESS OF BREATH: 0
FACIAL SWELLING: 0
COLOR CHANGE: 0
COUGH: 0
PHOTOPHOBIA: 0
SINUS PAIN: 0
TROUBLE SWALLOWING: 0
RHINORRHEA: 0
EYE PAIN: 0

## 2020-08-19 NOTE — PROGRESS NOTES
use: No     Alcohol/week: 0.0 standard drinks          Current Outpatient Medications   Medication Sig Dispense Refill    Cholecalciferol 50 MCG (2000 UT) CAPS Take 50 mcg by mouth daily 30 capsule 3    meloxicam (MOBIC) 15 MG tablet Take 1 tablet by mouth daily 30 tablet 2    albuterol sulfate  (90 Base) MCG/ACT inhaler Inhale 2 puffs into the lungs every 6 hours as needed for Wheezing or Shortness of Breath 1 Inhaler 0    CPAP Machine MISC by Does not apply route Please change CPAP pressure to auto 7-10 cm H20. (Patient not taking: Reported on 8/6/2020) 1 each 0     No current facility-administered medications for this visit. No Known Allergies    Subjective:      Review of Systems   Constitutional: Negative. Negative for activity change, appetite change, chills, diaphoresis, fatigue, fever and unexpected weight change. HENT: Negative. Negative for congestion, dental problem, drooling, ear discharge, ear pain, facial swelling, hearing loss, mouth sores, nosebleeds, postnasal drip, rhinorrhea, sinus pressure, sinus pain, sneezing, sore throat, tinnitus, trouble swallowing and voice change. Eyes: Negative. Negative for photophobia, pain, discharge, redness, itching and visual disturbance. Respiratory: Positive for apnea. Negative for cough, choking, chest tightness, shortness of breath, wheezing and stridor. Cardiovascular: Negative. Negative for chest pain, palpitations and leg swelling. Gastrointestinal: Negative. Endocrine: Negative. Negative for cold intolerance, heat intolerance, polydipsia, polyphagia and polyuria. Genitourinary: Negative. Negative for decreased urine volume, difficulty urinating, discharge, dysuria, enuresis, flank pain, frequency, genital sores, hematuria, penile pain, penile swelling, scrotal swelling, testicular pain and urgency. Musculoskeletal: Positive for joint swelling.  Negative for arthralgias, back pain, gait problem, myalgias, neck pain and neck stiffness. Skin: Positive for wound. Negative for color change, pallor and rash. Allergic/Immunologic: Negative. Negative for environmental allergies, food allergies and immunocompromised state. Neurological: Negative. Negative for dizziness, tremors, seizures, syncope, facial asymmetry, speech difficulty, weakness, light-headedness, numbness and headaches. Hematological: Negative. Negative for adenopathy. Does not bruise/bleed easily. Psychiatric/Behavioral: Negative. Negative for agitation, behavioral problems, confusion, decreased concentration, dysphoric mood, hallucinations, self-injury, sleep disturbance and suicidal ideas. The patient is not nervous/anxious and is not hyperactive. Objective:   /86 (Site: Left Upper Arm, Position: Sitting, Cuff Size: Medium Adult)   Pulse 86   Temp 97.5 °F (36.4 °C) (Temporal)   Resp 18   Ht 5' 10\" (1.778 m)   Wt 291 lb (132 kg)   SpO2 98%   BMI 41.75 kg/m²     Physical Exam  Constitutional:       Appearance: He is well-developed. HENT:      Head: Normocephalic and atraumatic. Eyes:      General: No scleral icterus. Left eye: No discharge. Conjunctiva/sclera: Conjunctivae normal.      Pupils: Pupils are equal, round, and reactive to light. Neck:      Thyroid: No thyromegaly. Trachea: No tracheal deviation. Cardiovascular:      Rate and Rhythm: Normal rate and regular rhythm. Heart sounds: Normal heart sounds. No murmur. No friction rub. No gallop. Pulmonary:      Effort: Pulmonary effort is normal. No respiratory distress. Breath sounds: Normal breath sounds. No wheezing or rales. Chest:      Chest wall: No tenderness. Abdominal:      General: There is no distension. Palpations: There is no mass. Tenderness: There is no abdominal tenderness. There is no guarding or rebound. Hernia: No hernia is present. Musculoskeletal: Normal range of motion. General: No tenderness. Lymphadenopathy:      Cervical: No cervical adenopathy. Skin:     General: Skin is warm and dry. Coloration: Skin is not pale. Findings: No erythema or rash. Neurological:      Mental Status: He is alert and oriented to person, place, and time. Psychiatric:         Behavior: Behavior normal.         Thought Content:  Thought content normal.         Judgment: Judgment normal.            Results for orders placed or performed during the hospital encounter of 08/05/20   Lipid Panel   Result Value Ref Range    Cholesterol, Total 235 (H) 100 - 199 mg/dL    Triglycerides 491 (H) 0 - 199 mg/dL    HDL 34 mg/dL    LDL Calculated SEE BELOW mg/dL   Comprehensive Metabolic Panel   Result Value Ref Range    Glucose 107 70 - 108 mg/dL    CREATININE 0.9 0.4 - 1.2 mg/dL    BUN 11 7 - 22 mg/dL    Sodium 140 135 - 145 meq/L    Potassium 4.0 3.5 - 5.2 meq/L    Chloride 100 98 - 111 meq/L    CO2 25 23 - 33 meq/L    Calcium 9.8 8.5 - 10.5 mg/dL    AST 43 (H) 5 - 40 U/L    Alkaline Phosphatase 104 38 - 126 U/L    Total Protein 7.8 6.1 - 8.0 g/dL    Alb 4.6 3.5 - 5.1 g/dL    Total Bilirubin 0.5 0.3 - 1.2 mg/dL    ALT 76 (H) 11 - 66 U/L   Hemoglobin A1C   Result Value Ref Range    Hemoglobin A1C 5.9 4.4 - 6.4 %    AVERAGE GLUCOSE 117 70 - 126 mg/dL   TSH with Reflex   Result Value Ref Range    TSH 2.320 0.400 - 4.20 uIU/mL   CBC Auto Differential   Result Value Ref Range    WBC 12.0 (H) 4.8 - 10.8 thou/mm3    RBC 5.41 4.70 - 6.10 mill/mm3    Hemoglobin 17.1 14.0 - 18.0 gm/dl    Hematocrit 50.3 42.0 - 52.0 %    MCV 93.0 80.0 - 94.0 fL    MCH 31.6 26.0 - 33.0 pg    MCHC 34.0 32.2 - 35.5 gm/dl    RDW-CV 13.1 11.5 - 14.5 %    RDW-SD 44.1 35.0 - 45.0 fL    Platelets 782 523 - 289 thou/mm3    MPV 9.7 9.4 - 12.4 fL    Seg Neutrophils 63.4 %    Lymphocytes 26.6 %    Monocytes 5.8 %    Eosinophils 2.6 %    Basophils 0.8 %    Immature Granulocytes 0.8 %    Segs Absolute 7.6 1.8 - 7.7 thou/mm3    Lymphocytes Absolute 3.2 1.0 - 4.8 thou/mm3    Monocytes Absolute 0.7 0.4 - 1.3 thou/mm3    Eosinophils Absolute 0.3 0.0 - 0.4 thou/mm3    Basophils Absolute 0.1 0.0 - 0.1 thou/mm3    Immature Grans (Abs) 0.10 (H) 0.00 - 0.07 thou/mm3    nRBC 0 /100 wbc   PSA, Prostatic Specific Antigen   Result Value Ref Range    PSA 1.18 (H) 0.00 - 9.02 ng/mL   Cyclic Citrul Peptide Antibody, IgG   Result Value Ref Range    CC Peptide,IgG Ab < 1.5 <4.0 U/mL   JESUS Screen with Reflex   Result Value Ref Range    JESUS SCREEN None Detected None Detec   C-Reactive Protein   Result Value Ref Range    CRP 0.60 0.00 - 1.00 mg/dl   Sedimentation Rate   Result Value Ref Range    Sed Rate 13 (H) 0 - 10 mm/hr   Rheumatoid Factor   Result Value Ref Range    Rheumatoid Factor < 10 0 - 13 IU/mL   Anti-DNA Antibody, Double-Stranded   Result Value Ref Range    dsDNA Ab SEE BELOW    Anion Gap   Result Value Ref Range    Anion Gap 15.0 8.0 - 16.0 meq/L   Glomerular Filtration Rate, Estimated   Result Value Ref Range    Est, Glom Filt Rate >90 ml/min/1.73m2       Assessment:     Pilonidal disease this has been going on for a while patient is likely going to require excision of same however reasonable to try some antibiotics and return to the office    Plan:     Return to office in 2 weeks      Electronicallysigned by Gonzalo Kim MD on 8/19/2020 at 7:14 PM

## 2020-09-02 ASSESSMENT — ENCOUNTER SYMPTOMS
PHOTOPHOBIA: 0
COUGH: 0
SHORTNESS OF BREATH: 0
SINUS PAIN: 0
COLOR CHANGE: 0
FACIAL SWELLING: 0
TROUBLE SWALLOWING: 0
ABDOMINAL PAIN: 0
SINUS PRESSURE: 0

## 2020-09-02 NOTE — PROGRESS NOTES
Subjective:      Patient ID: Tomas Jama is a 38 y.o. male. Marisabel Magana returns the office for follow-up check and for suture removal from the area of the punch biopsy of the pigmented lesion of the back that was questionable for being dysplastic. He does state that he was seen by Dr. Pia Lindo for evaluation of the pilonidal cyst.      Review of Systems   Constitutional: Negative for unexpected weight change. HENT: Negative for dental problem, facial swelling, nosebleeds, sinus pressure, sinus pain and trouble swallowing. Eyes: Negative for photophobia and visual disturbance. Respiratory: Negative for cough and shortness of breath. Cardiovascular: Negative for chest pain and leg swelling. Gastrointestinal: Negative for abdominal pain. Endocrine: Negative for cold intolerance and heat intolerance. Musculoskeletal: Negative for neck pain and neck stiffness. Skin: Negative for color change, pallor, rash and wound. Neurological: Negative for dizziness, facial asymmetry, light-headedness, numbness and headaches. Hematological: Does not bruise/bleed easily. Objective:   Physical Exam  Vitals signs and nursing note reviewed. Constitutional:       General: He is not in acute distress. Appearance: Normal appearance. HENT:      Head: Normocephalic and atraumatic. Right Ear: External ear normal.      Left Ear: External ear normal.      Nose: Nose normal.      Mouth/Throat:      Mouth: Mucous membranes are moist.      Pharynx: Oropharynx is clear. Eyes:      General:         Right eye: No discharge. Left eye: No discharge. Extraocular Movements: Extraocular movements intact. Pupils: Pupils are equal, round, and reactive to light. Neck:      Musculoskeletal: Normal range of motion and neck supple. Thyroid: No thyromegaly. Trachea: No tracheal deviation. Cardiovascular:      Rate and Rhythm: Normal rate. Pulses: Normal pulses.  No decreased pulses. Pulmonary:      Effort: Pulmonary effort is normal. No respiratory distress. Abdominal:      General: Abdomen is flat. There is no distension. Palpations: Abdomen is soft. Tenderness: There is no abdominal tenderness. Musculoskeletal:         General: No deformity. Skin:     General: Skin is warm. Capillary Refill: Capillary refill takes 2 to 3 seconds. Findings: No erythema or rash. Neurological:      General: No focal deficit present. Mental Status: He is alert and oriented to person, place, and time. Mental status is at baseline. Sensory: No sensory deficit. Psychiatric:         Mood and Affect: Mood normal.         Behavior: Behavior normal.         Thought Content: Thought content normal.         Judgment: Judgment normal.         Assessment:      Neoplasm of uncertain behavior of the skin of the back. Pathology results are reviewed with the patient and the punch biopsy was negative for any malignancy as the tissue demonstrated minimal perivascular chronic inflammation without eosinophils.       Plan:      Follow-up as needed        Jamal John MD

## 2020-10-06 ENCOUNTER — NURSE ONLY (OUTPATIENT)
Dept: LAB | Age: 40
End: 2020-10-06

## 2020-10-06 ENCOUNTER — OFFICE VISIT (OUTPATIENT)
Dept: PULMONOLOGY | Age: 40
End: 2020-10-06
Payer: COMMERCIAL

## 2020-10-06 VITALS
SYSTOLIC BLOOD PRESSURE: 126 MMHG | DIASTOLIC BLOOD PRESSURE: 72 MMHG | OXYGEN SATURATION: 97 % | HEART RATE: 71 BPM | BODY MASS INDEX: 41.03 KG/M2 | WEIGHT: 286.6 LBS | HEIGHT: 70 IN

## 2020-10-06 LAB — VITAMIN D 25-HYDROXY: 23 NG/ML (ref 30–100)

## 2020-10-06 PROCEDURE — 4004F PT TOBACCO SCREEN RCVD TLK: CPT | Performed by: PHYSICIAN ASSISTANT

## 2020-10-06 PROCEDURE — G8484 FLU IMMUNIZE NO ADMIN: HCPCS | Performed by: PHYSICIAN ASSISTANT

## 2020-10-06 PROCEDURE — 99213 OFFICE O/P EST LOW 20 MIN: CPT | Performed by: PHYSICIAN ASSISTANT

## 2020-10-06 PROCEDURE — G8427 DOCREV CUR MEDS BY ELIG CLIN: HCPCS | Performed by: PHYSICIAN ASSISTANT

## 2020-10-06 PROCEDURE — G8417 CALC BMI ABV UP PARAM F/U: HCPCS | Performed by: PHYSICIAN ASSISTANT

## 2020-10-06 ASSESSMENT — ENCOUNTER SYMPTOMS
SHORTNESS OF BREATH: 0
WHEEZING: 0
ALLERGIC/IMMUNOLOGIC NEGATIVE: 1
DIARRHEA: 0
BACK PAIN: 0
EYES NEGATIVE: 1
STRIDOR: 0
NAUSEA: 0
CHEST TIGHTNESS: 0
COUGH: 0

## 2020-10-06 NOTE — PROGRESS NOTES
Social History     Tobacco Use    Smoking status: Current Every Day Smoker     Packs/day: 1.50     Years: 24.00     Pack years: 36.00     Types: Cigarettes     Start date: 12/12/1995    Smokeless tobacco: Never Used    Tobacco comment: 1 ppd 10/6/20   Substance Use Topics    Alcohol use: No     Alcohol/week: 0.0 standard drinks    Drug use: No       No Known Allergies    Current Outpatient Medications   Medication Sig Dispense Refill    CPAP Machine MISC by Does not apply route Please change CPAP pressure to auto 7-10 cm H20. 1 each 0    meloxicam (MOBIC) 15 MG tablet Take 1 tablet by mouth daily 30 tablet 2    albuterol sulfate  (90 Base) MCG/ACT inhaler Inhale 2 puffs into the lungs every 6 hours as needed for Wheezing or Shortness of Breath 1 Inhaler 0    Cholecalciferol 50 MCG (2000 UT) CAPS Take 50 mcg by mouth daily (Patient not taking: Reported on 10/6/2020) 30 capsule 3     No current facility-administered medications for this visit. Family History   Problem Relation Age of Onset    Cancer Father     Arthritis Maternal Grandmother     Diabetes Maternal Grandmother     Arthritis Maternal Grandfather     Diabetes Maternal Grandfather     Heart Disease Maternal Grandfather     Kidney Disease Maternal Grandfather     Arthritis Paternal Grandmother     Diabetes Paternal Grandmother     Stroke Paternal Grandmother     Arthritis Paternal Grandfather     Diabetes Paternal Grandfather         Review of Systems -   Review of Systems   Constitutional: Negative for activity change, appetite change, chills and fever. HENT: Negative for congestion and postnasal drip. Eyes: Negative. Respiratory: Negative for cough, chest tightness, shortness of breath, wheezing and stridor. Cardiovascular: Negative for chest pain and leg swelling. Gastrointestinal: Negative for diarrhea and nausea. Endocrine: Negative. Genitourinary: Negative. Musculoskeletal: Negative. Negative for arthralgias and back pain. Skin: Negative. Allergic/Immunologic: Negative. Neurological: Negative. Negative for dizziness and light-headedness. Psychiatric/Behavioral: Negative. All other systems reviewed and are negative. Physical Exam:    BMI:  Body mass index is 41.12 kg/m². Wt Readings from Last 3 Encounters:   10/06/20 286 lb 9.6 oz (130 kg)   08/12/20 288 lb 3.2 oz (130.7 kg)   08/06/20 291 lb (132 kg)     Weight stable / unchanged  Vitals: /72 (Site: Left Upper Arm, Position: Sitting, Cuff Size: Large Adult)   Pulse 71   Ht 5' 10\" (1.778 m)   Wt 286 lb 9.6 oz (130 kg)   SpO2 97% Comment: on room air at rest  BMI 41.12 kg/m²       Physical Exam  Constitutional:       Appearance: He is well-developed. HENT:      Head: Normocephalic and atraumatic. Right Ear: External ear normal.      Left Ear: External ear normal.   Eyes:      Conjunctiva/sclera: Conjunctivae normal.      Pupils: Pupils are equal, round, and reactive to light. Neck:      Musculoskeletal: Normal range of motion and neck supple. Cardiovascular:      Rate and Rhythm: Normal rate and regular rhythm. Heart sounds: Normal heart sounds. Pulmonary:      Effort: Pulmonary effort is normal.      Breath sounds: Normal breath sounds. Musculoskeletal: Normal range of motion. Skin:     General: Skin is warm and dry. Neurological:      Mental Status: He is alert and oriented to person, place, and time. Psychiatric:         Behavior: Behavior normal.         Thought Content: Thought content normal.         Judgment: Judgment normal.         ASSESSMENT/DIAGNOSIS     Diagnosis Orders   1. Obstructive sleep apnea     2. Morbid obesity with BMI of 40.0-44.9, adult ([facility] [address]     3. Hypersomnia     4. Vitamin D deficiency              Plan   Do you need any equipment today?  No  - Will check Vit D level- continue replacement pending level  - Hypersomnia improved with replacement  - Will set pressure

## 2020-10-06 NOTE — PATIENT INSTRUCTIONS
Patient Education        Learning About Benefits From Quitting Smoking  How does quitting smoking make you healthier? If you're thinking about quitting smoking, you may have a few reasons to be smoke-free. Your health may be one of them. · When you quit smoking, you lower your risks for cancer, lung disease, heart attack, stroke, blood vessel disease, and blindness from macular degeneration. · When you're smoke-free, you get sick less often, and you heal faster. You are less likely to get colds, flu, bronchitis, and pneumonia. · As a nonsmoker, you may find that your mood is better and you are less stressed. When and how will you feel healthier? Quitting has real health benefits that start from day 1 of being smoke-free. And the longer you stay smoke-free, the healthier you get and the better you feel. The first hours  · After just 20 minutes, your blood pressure and heart rate go down. That means there's less stress on your heart and blood vessels. · Within 12 hours, the level of carbon monoxide in your blood drops back to normal. That makes room for more oxygen. With more oxygen in your body, you may notice that you have more energy than when you smoked. After 2 weeks  · Your lungs start to work better. · Your risk of heart attack starts to drop. After 1 month  · When your lungs are clear, you cough less and breathe deeper, so it's easier to be active. · Your sense of taste and smell return. That means you can enjoy food more than you have since you started smoking. Over the years  · Over the years, your risks of heart disease, heart attack, and stroke are lower. · After 10 years, your risk of dying from lung cancer is cut by about half. And your risk for many other types of cancer is lower too. How would quitting help others in your life? When you quit smoking, you improve the health of everyone who now breathes in your smoke.   · Their heart, lung, and cancer risks drop, much like yours.  · They are sick less. For babies and small children, living smoke-free means they're less likely to have ear infections, pneumonia, and bronchitis. · If you're a woman who is or will be pregnant someday, quitting smoking means a healthier . · Children who are close to you are less likely to become adult smokers. Where can you learn more? Go to https://[employer]. org and sign in to your [employer] account. Enter [phone] in the [employer] box to learn more about \"Learning About Benefits From Quitting Smoking. \"     If you do not have an account, please click on the \"Sign Up Now\" link. Current as of: 2020               Content Version: 12.5  © 6567-7249 Healthwise, Incorporated. Care instructions adapted under license by [facility] ([facility]). If you have questions about a medical condition or this instruction, always ask your healthcare professional. [place] [address] any warranty or liability for your use of this information.

## 2020-10-07 ENCOUNTER — TELEPHONE (OUTPATIENT)
Dept: PULMONOLOGY | Age: 40
End: 2020-10-07

## 2020-10-07 NOTE — TELEPHONE ENCOUNTER
Attempted to call patient to inform to continue Vitamin D per Louise Field per labs. Message left with son for patient to call back.

## 2020-10-20 RX ORDER — MELOXICAM 15 MG/1
15 TABLET ORAL DAILY
Qty: 30 TABLET | Refills: 2 | Status: SHIPPED | OUTPATIENT
Start: 2020-10-20 | End: 2021-04-28 | Stop reason: SDUPTHER

## 2020-10-20 NOTE — TELEPHONE ENCOUNTER
Luis Tamayo called requesting a refill on the following medications:  Requested Prescriptions     Pending Prescriptions Disp Refills    meloxicam (MOBIC) 15 MG tablet 30 tablet 2     Sig: Take 1 tablet by mouth daily     Pharmacy verified: Yes  . pv      Date of last visit: 8/10/20  Date of next visit (if applicable): Visit date not found      PT Requesting a call back if medication can be filled now or if he needs an appt.

## 2021-02-04 ENCOUNTER — OFFICE VISIT (OUTPATIENT)
Dept: SURGERY | Age: 41
End: 2021-02-04
Payer: COMMERCIAL

## 2021-02-04 VITALS
RESPIRATION RATE: 14 BRPM | WEIGHT: 286 LBS | TEMPERATURE: 96.5 F | HEIGHT: 70 IN | SYSTOLIC BLOOD PRESSURE: 130 MMHG | HEART RATE: 83 BPM | DIASTOLIC BLOOD PRESSURE: 78 MMHG | BODY MASS INDEX: 40.94 KG/M2 | OXYGEN SATURATION: 98 %

## 2021-02-04 DIAGNOSIS — Z01.818 PRE-OP TESTING: ICD-10-CM

## 2021-02-04 DIAGNOSIS — L05.91 PILONIDAL CYST: Primary | ICD-10-CM

## 2021-02-04 PROCEDURE — G8427 DOCREV CUR MEDS BY ELIG CLIN: HCPCS | Performed by: SURGERY

## 2021-02-04 PROCEDURE — 4004F PT TOBACCO SCREEN RCVD TLK: CPT | Performed by: SURGERY

## 2021-02-04 PROCEDURE — G8417 CALC BMI ABV UP PARAM F/U: HCPCS | Performed by: SURGERY

## 2021-02-04 PROCEDURE — 99212 OFFICE O/P EST SF 10 MIN: CPT | Performed by: SURGERY

## 2021-02-04 PROCEDURE — G8484 FLU IMMUNIZE NO ADMIN: HCPCS | Performed by: SURGERY

## 2021-02-04 NOTE — PROGRESS NOTES
[address] [facility] [address]  Dept: [phone]  Dept Fax: [phone]  Loc: [phone]    Visit Date: 2/4/2021    Eliza Patino is a 42 y.o. male who presentstoday for:  Chief Complaint   Patient presents with    Surgical Consult     est pt- last seen 8/2020- pilonidal disease       HPI:     HPI 40-year-old white male who was seen in August of last year with fairly classic pilonidal process he had been referred from Dr. Mariaelena Loera plastic surgeon. At that time he had fairly classic sinus pitting in his gluteal crease consistent with pilonidal disease it was felt that he would likely have ongoing problems but he was given antibiotics patient is continue to have pain and drainage and is here for my evaluation    Past Medical History:   Diagnosis Date    Anxiety     Asthma     as child    Depression     Hyperlipidemia     Metabolic syndrome 82/37/2959    Obesity     Sleep apnea     Has CPAP      Past Surgical History:   Procedure Laterality Date    [place]  [place]    [place], [place]    KNEE ARTHROSCOPY  01/2018   [employer] SINUS SURGERY  07/16/2014    RT.  MAXILLARY ANTROSTOMY WITH TISSUE REMOVAL SEPTOPLASTY TURBINATE REDUCTION EXCISION AND BIOPSLY LESION MOUTH         Family History   Problem Relation Age of Onset    Cancer Father     Arthritis Maternal Grandmother     Diabetes Maternal Grandmother     Arthritis Maternal Grandfather     Diabetes Maternal Grandfather     Heart Disease Maternal Grandfather     Kidney Disease Maternal Grandfather     Arthritis Paternal Grandmother     Diabetes Paternal Grandmother     Stroke Paternal Grandmother     Arthritis Paternal Grandfather     Diabetes Paternal Grandfather         Social History     Tobacco Use    Smoking status: Current Every Day Smoker     Packs/day: 1.50     Years: 24.00     Pack years: 36.00     Types: Cigarettes     Start date: 12/12/1995  Smokeless tobacco: Never Used    Tobacco comment: 1 ppd 10/6/20   Substance Use Topics    Alcohol use: No     Alcohol/week: 0.0 standard drinks          Current Outpatient Medications   Medication Sig Dispense Refill    meloxicam (MOBIC) 15 MG tablet Take 1 tablet by mouth daily 30 tablet 2    CPAP Machine MISC by Does not apply route Please change CPAP pressure to 10 cm H20. 1 each 0    Cholecalciferol 50 MCG (2000 UT) CAPS Take 50 mcg by mouth daily 30 capsule 5    albuterol sulfate  (90 Base) MCG/ACT inhaler Inhale 2 puffs into the lungs every 6 hours as needed for Wheezing or Shortness of Breath (Patient not taking: Reported on 2/4/2021) 1 Inhaler 0     No current facility-administered medications for this visit. No Known Allergies    Subjective:      Review of Systems   Constitutional: Negative. Negative for activity change, appetite change, chills, diaphoresis, fatigue, fever and unexpected weight change. HENT: Negative. Negative for congestion, dental problem, drooling, ear discharge, ear pain, facial swelling, hearing loss, mouth sores, nosebleeds, postnasal drip, rhinorrhea, sinus pressure, sinus pain, sneezing, sore throat, tinnitus, trouble swallowing and voice change. Eyes: Negative. Negative for photophobia, pain, discharge, redness, itching and visual disturbance. Respiratory: Negative. Negative for apnea, cough, choking, chest tightness, shortness of breath, wheezing and stridor. Cardiovascular: Negative. Negative for chest pain, palpitations and leg swelling. Gastrointestinal: Negative. Endocrine: Negative. Negative for cold intolerance, heat intolerance, polydipsia, polyphagia and polyuria. Genitourinary: Negative. Negative for decreased urine volume, difficulty urinating, discharge, dysuria, enuresis, flank pain, frequency, genital sores, hematuria, penile pain, penile swelling, scrotal swelling, testicular pain and urgency. Musculoskeletal: Negative. Negative for arthralgias, back pain, gait problem, joint swelling, myalgias, neck pain and neck stiffness. Skin: Negative for color change, pallor, rash and wound. Allergic/Immunologic: Negative. Negative for environmental allergies, food allergies and immunocompromised state. Neurological: Negative. Negative for dizziness, tremors, seizures, syncope, facial asymmetry, speech difficulty, weakness, light-headedness, numbness and headaches. Hematological: Negative. Negative for adenopathy. Does not bruise/bleed easily. Psychiatric/Behavioral: Negative. Negative for agitation, behavioral problems, confusion, decreased concentration, dysphoric mood, hallucinations, self-injury, sleep disturbance and suicidal ideas. The patient is not nervous/anxious and is not hyperactive. Objective:   /78 (Site: Right Lower Arm, Position: Sitting, Cuff Size: Medium Adult)   Pulse 83   Temp 96.5 °F (35.8 °C) (Tympanic)   Resp 14   Ht 5' 10\" (1.778 m)   Wt 286 lb (129.7 kg)   SpO2 98%   BMI 41.04 kg/m²     Physical Exam  Constitutional:       Appearance: He is well-developed. HENT:      Head: Normocephalic and atraumatic. Eyes:      General: No scleral icterus. Left eye: No discharge. Conjunctiva/sclera: Conjunctivae normal.      Pupils: Pupils are equal, round, and reactive to light. Neck:      Thyroid: No thyromegaly. Trachea: No tracheal deviation. Cardiovascular:      Rate and Rhythm: Normal rate and regular rhythm. Heart sounds: Normal heart sounds. No murmur. No friction rub. No gallop. Pulmonary:      Effort: Pulmonary effort is normal. No respiratory distress. Breath sounds: Normal breath sounds. No wheezing or rales. Chest:      Chest wall: No tenderness. Abdominal:      General: There is no distension. Tenderness: There is no abdominal tenderness. There is no rebound. Hernia: No hernia is present. Musculoskeletal: Normal range of motion. General: No tenderness. Lymphadenopathy:      Cervical: No cervical adenopathy. Skin:     General: Skin is warm and dry. Coloration: Skin is not pale. Findings: No erythema or rash. Neurological:      Mental Status: He is alert and oriented to person, place, and time. Psychiatric:         Behavior: Behavior normal.         Thought Content: Thought content normal.         Judgment: Judgment normal.            Results for orders placed or performed in visit on 10/06/20   Vitamin D 25 Hydroxy   Result Value Ref Range    Vit D, 25-Hydroxy 23 (L) 30 - 100 ng/ml       Assessment:     Persistent pilonidal disease we discussed patient the need to excise this area we also discussed the potential problems with recurrence in spite of excision he voices understanding would like to proceed    Plan:     1. Schedule Marizol Law for excision of pilonidal disease  2. The risks, benefits and alternatives were discussed with Marizol Law. He understands and wishes to proceed with surgical intervention. 3. Restrictions discussed with Marizol Law and he expresses understanding. 4. He is advised to call back directly if there are further questions/concerns, or if his symptoms worsen prior to surgery.             Electronicallysigned by Real Castillo MD on 2/4/2021 at 3:17 PM

## 2021-02-08 ENCOUNTER — TELEPHONE (OUTPATIENT)
Dept: SURGERY | Age: 41
End: 2021-02-08

## 2021-02-08 ENCOUNTER — PREP FOR PROCEDURE (OUTPATIENT)
Dept: SURGERY | Age: 41
End: 2021-02-08

## 2021-02-08 RX ORDER — SODIUM CHLORIDE 9 MG/ML
INJECTION, SOLUTION INTRAVENOUS CONTINUOUS
Status: CANCELLED | OUTPATIENT
Start: 2021-02-08

## 2021-02-11 ASSESSMENT — ENCOUNTER SYMPTOMS
VOICE CHANGE: 0
SINUS PAIN: 0
CHEST TIGHTNESS: 0
EYES NEGATIVE: 1
SHORTNESS OF BREATH: 0
TROUBLE SWALLOWING: 0
CHOKING: 0
FACIAL SWELLING: 0
WHEEZING: 0
BACK PAIN: 0
GASTROINTESTINAL NEGATIVE: 1
COUGH: 0
PHOTOPHOBIA: 0
RESPIRATORY NEGATIVE: 1
SORE THROAT: 0
SINUS PRESSURE: 0
RHINORRHEA: 0
STRIDOR: 0
EYE ITCHING: 0
COLOR CHANGE: 0
EYE DISCHARGE: 0
APNEA: 0
EYE REDNESS: 0
ALLERGIC/IMMUNOLOGIC NEGATIVE: 1
EYE PAIN: 0

## 2021-02-23 ENCOUNTER — TELEPHONE (OUTPATIENT)
Dept: SURGERY | Age: 41
End: 2021-02-23

## 2021-02-23 NOTE — TELEPHONE ENCOUNTER
Efeelvis Benjamin called in to cancel his surgery for this Friday due to work. He will call back to reschedule when he can.

## 2021-03-28 ENCOUNTER — APPOINTMENT (OUTPATIENT)
Dept: GENERAL RADIOLOGY | Age: 41
End: 2021-03-28
Payer: COMMERCIAL

## 2021-03-28 ENCOUNTER — HOSPITAL ENCOUNTER (EMERGENCY)
Age: 41
Discharge: HOME | End: 2021-03-28
Payer: COMMERCIAL

## 2021-03-28 VITALS
RESPIRATION RATE: 17 BRPM | SYSTOLIC BLOOD PRESSURE: 150 MMHG | WEIGHT: 290 LBS | BODY MASS INDEX: 41.61 KG/M2 | HEART RATE: 77 BPM | TEMPERATURE: 98.2 F | DIASTOLIC BLOOD PRESSURE: 91 MMHG | OXYGEN SATURATION: 95 %

## 2021-03-28 DIAGNOSIS — S83.92XA SPRAIN OF LEFT KNEE, UNSPECIFIED LIGAMENT, INITIAL ENCOUNTER: Primary | ICD-10-CM

## 2021-03-28 PROCEDURE — 73564 X-RAY EXAM KNEE 4 OR MORE: CPT

## 2021-03-28 PROCEDURE — 99282 EMERGENCY DEPT VISIT SF MDM: CPT

## 2021-03-28 ASSESSMENT — PAIN SCALES - GENERAL: PAINLEVEL_OUTOF10: 8

## 2021-03-28 ASSESSMENT — PAIN DESCRIPTION - PAIN TYPE: TYPE: ACUTE PAIN

## 2021-03-28 ASSESSMENT — PAIN DESCRIPTION - FREQUENCY: FREQUENCY: CONTINUOUS

## 2021-03-28 NOTE — ED PROVIDER NOTES
[address] [facility] [address] [address] EMERGENCY DEPT      CHIEF COMPLAINT     Knee pain. Nurses Notes reviewed and I agree except as noted in the HPI. HISTORY OF PRESENT ILLNESS    Janae Portillo is a 42 y.o. male who presents for left knee pain and leg swelling. Twisted playing golf. Meloxicam usually helps, not this time. Patient has used CBD cream, ice, elevation without improvement. Some paresthesias. Hard to ambulate therefore using crutches. No other complaints. REVIEW OF SYSTEMS     Review of Systems   Constitutional: Negative for fever. HENT: Negative for rhinorrhea. Eyes: Negative for photophobia. Respiratory: Negative for shortness of breath. Cardiovascular: Negative for chest pain. Gastrointestinal: Negative for nausea and vomiting. Musculoskeletal: Positive for arthralgias and gait problem. Negative for back pain and neck pain. Skin: Negative for rash and wound. Neurological: Negative for weakness and numbness (there are paresthesias). Psychiatric/Behavioral: Negative for confusion. PAST MEDICAL HISTORY    has a past medical history of Anxiety, Asthma, Depression, Hyperlipidemia, Metabolic syndrome, Obesity, and Sleep apnea. SURGICAL HISTORY      has a past surgical history that includes Appendectomy (1996); sinus surgery (07/16/2014); and Knee arthroscopy (01/2018). CURRENT MEDICATIONS       Discharge Medication List as of 3/28/2021  3:59 PM      CONTINUE these medications which have NOT CHANGED    Details   meloxicam (MOBIC) 15 MG tablet Take 1 tablet by mouth daily, Disp-30 tablet, R-2Normal      CPAP Machine MISC Starting Tue 10/6/2020, Disp-1 each,R-0, PrintPlease change CPAP pressure to 10 cm H20.       Cholecalciferol 50 MCG (2000 UT) CAPS Take 50 mcg by mouth daily, Disp-30 capsule,R-5Normal      albuterol sulfate  (90 Base) MCG/ACT inhaler Inhale 2 puffs into the lungs every 6 hours as needed for Wheezing or Shortness of Breath, Disp-1 Inhaler, R-0Normal ALLERGIES     has No Known Allergies. FAMILY HISTORY     He indicated that his mother is alive. He indicated that his father is . He indicated that his maternal grandmother is . He indicated that his maternal grandfather is . He indicated that his paternal grandmother is . He indicated that his paternal grandfather is . family history includes Arthritis in his maternal grandfather, maternal grandmother, paternal grandfather, and paternal grandmother; Cancer in his father; Diabetes in his maternal grandfather, maternal grandmother, paternal grandfather, and paternal grandmother; Heart Disease in his maternal grandfather; Kidney Disease in his maternal grandfather; Stroke in his paternal grandmother. SOCIAL HISTORY    reports that he has been smoking cigarettes. He started smoking about 25 years ago. He has a 36.00 pack-year smoking history. He has never used smokeless tobacco. He reports that he does not drink alcohol or use drugs. PHYSICAL EXAM     INITIAL VITALS:  weight is 290 lb (131.5 kg). His oral temperature is 98.2 °F (36.8 °C). His blood pressure is 150/91 (abnormal) and his pulse is 77. His respiration is 17 and oxygen saturation is 95%. Physical Exam  Vitals signs and nursing note reviewed. Constitutional:       General: He is not in acute distress. Appearance: He is well-developed. He is not toxic-appearing or diaphoretic. HENT:      Head: Normocephalic and atraumatic. Right Ear: Hearing normal.      Left Ear: Hearing normal.      Nose: Nose normal. No nasal deformity. Eyes:      General: Lids are normal.      Conjunctiva/sclera: Conjunctivae normal.   Neck:      Musculoskeletal: No neck rigidity. Trachea: Trachea normal. No tracheal deviation. Cardiovascular:      Rate and Rhythm: Normal rate and regular rhythm. Pulses:           Dorsalis pedis pulses are 2+ on the right side and 2+ on the left side.         Posterior Vitals:    03/28/21 1451   BP: (!) 150/91   Pulse: 77   Resp: 17   Temp: 98.2 °F (36.8 °C)   TempSrc: Oral   SpO2: 95%   Weight: 290 lb (131.5 kg)       MDM:  The patient was seen and evaluated by me in the intake area. Vital signs were reviewed. Physical exam revealed swelling to the left knee with effusion. Appropriate testing was ordered. Results were reviewed by me upon completion. Results showed spurring but no fracture. Results were discussed with the patient and discharge plan was discussed. I have given the patient strict written and verbal instructions about care at home, follow-up, and signs and symptoms of worsening of condition and they did verbalize understanding. CRITICAL CARE:   None    CONSULTS:  None    PROCEDURES:  None    FINAL IMPRESSION      1. Sprain of left knee, unspecified ligament, initial encounter          DISPOSITION/PLAN     1.  Sprain of left knee, unspecified ligament, initial encounter        PATIENT REFERRED TO:  [address] [phone]  Schedule an appointment as soon as possible for a visit         DISCHARGE MEDICATIONS:  Discharge Medication List as of 3/28/2021  3:59 PM          (Please note that portions of this note were completed with a voice recognition program.  Efforts were made to edit the dictations but occasionally words are mis-transcribed.)    Pamela Barajas PA-C 04/03/21 4:47 PM    SVETLANA Ortiz PA-C  04/03/21 [id number]

## 2021-04-03 ASSESSMENT — ENCOUNTER SYMPTOMS
PHOTOPHOBIA: 0
VOMITING: 0
SHORTNESS OF BREATH: 0
RHINORRHEA: 0
BACK PAIN: 0
NAUSEA: 0

## 2021-04-06 ENCOUNTER — OFFICE VISIT (OUTPATIENT)
Dept: PULMONOLOGY | Age: 41
End: 2021-04-06
Payer: COMMERCIAL

## 2021-04-06 VITALS
HEIGHT: 70 IN | BODY MASS INDEX: 42.46 KG/M2 | HEART RATE: 77 BPM | WEIGHT: 296.6 LBS | OXYGEN SATURATION: 97 % | SYSTOLIC BLOOD PRESSURE: 138 MMHG | DIASTOLIC BLOOD PRESSURE: 86 MMHG | TEMPERATURE: 98.4 F

## 2021-04-06 DIAGNOSIS — G47.33 OBSTRUCTIVE SLEEP APNEA: Primary | ICD-10-CM

## 2021-04-06 DIAGNOSIS — G47.10 HYPERSOMNIA: ICD-10-CM

## 2021-04-06 DIAGNOSIS — E55.9 VITAMIN D DEFICIENCY: ICD-10-CM

## 2021-04-06 DIAGNOSIS — E66.01 MORBID OBESITY WITH BMI OF 40.0-44.9, ADULT (HCC): ICD-10-CM

## 2021-04-06 PROCEDURE — 4004F PT TOBACCO SCREEN RCVD TLK: CPT | Performed by: PHYSICIAN ASSISTANT

## 2021-04-06 PROCEDURE — G8427 DOCREV CUR MEDS BY ELIG CLIN: HCPCS | Performed by: PHYSICIAN ASSISTANT

## 2021-04-06 PROCEDURE — G8417 CALC BMI ABV UP PARAM F/U: HCPCS | Performed by: PHYSICIAN ASSISTANT

## 2021-04-06 PROCEDURE — 99213 OFFICE O/P EST LOW 20 MIN: CPT | Performed by: PHYSICIAN ASSISTANT

## 2021-04-06 ASSESSMENT — ENCOUNTER SYMPTOMS
CHEST TIGHTNESS: 0
ALLERGIC/IMMUNOLOGIC NEGATIVE: 1
COUGH: 0
DIARRHEA: 0
EYES NEGATIVE: 1
STRIDOR: 0
NAUSEA: 0
WHEEZING: 0
BACK PAIN: 0
SHORTNESS OF BREATH: 0

## 2021-04-06 NOTE — PATIENT INSTRUCTIONS
Patient Education        Stopping Smoking: Care Instructions  Your Care Instructions     Cigarette smokers crave the nicotine in cigarettes. Giving it up is much harder than simply changing a habit. Your body has to stop craving the nicotine. It is hard to quit, but you can do it. There are many tools that people use to quit smoking. You may find that combining tools works best for you. There are several steps to quitting. First you get ready to quit. Then you get support to help you. After that, you learn new skills and behaviors to become a nonsmoker. For many people, a necessary step is getting and using medicine. Your doctor will help you set up the plan that best meets your needs. You may want to attend a smoking cessation program to help you quit smoking. When you choose a program, look for one that has proven success. Ask your doctor for ideas. You will greatly increase your chances of success if you take medicine as well as get counseling or join a cessation program.  Some of the changes you feel when you first quit tobacco are uncomfortable. Your body will miss the nicotine at first, and you may feel short-tempered and grumpy. You may have trouble sleeping or concentrating. Medicine can help you deal with these symptoms. You may struggle with changing your smoking habits and rituals. The last step is the tricky one: Be prepared for the smoking urge to continue for a time. This is a lot to deal with, but keep at it. You will feel better. Follow-up care is a key part of your treatment and safety. Be sure to make and go to all appointments, and call your doctor if you are having problems. It's also a good idea to know your test results and keep a list of the medicines you take. How can you care for yourself at home? · Ask your family, friends, and coworkers for support. You have a better chance of quitting if you have help and support.   · Join a support group, such as Nicotine Anonymous, for people who are trying to quit smoking. · Consider signing up for a smoking cessation program, such as the American Lung Association's Freedom from Smoking program.  · Get text messaging support. Go to the website at www.smokefree. gov to sign up for the [facility] program.  · Set a quit date. Pick your date carefully so that it is not right in the middle of a big deadline or stressful time. Once you quit, do not even take a puff. Get rid of all ashtrays and lighters after your last cigarette. Clean your house and your clothes so that they do not smell of smoke. · Learn how to be a nonsmoker. Think about ways you can avoid those things that make you reach for a cigarette. ? Avoid situations that put you at greatest risk for smoking. For some people, it is hard to have a drink with friends without smoking. For others, they might skip a coffee break with coworkers who smoke. ? Change your daily routine. Take a different route to work or eat a meal in a different place. · Cut down on stress. Calm yourself or release tension by doing an activity you enjoy, such as reading a book, taking a hot bath, or gardening. · Talk to your doctor or pharmacist about nicotine replacement therapy, which replaces the nicotine in your body. You still get nicotine but you do not use tobacco. Nicotine replacement products help you slowly reduce the amount of nicotine you need. These products come in several forms, many of them available over-the-counter:  ? Nicotine patches  ? Nicotine gum and lozenges  ? Nicotine inhaler  · Ask your doctor about bupropion (Wellbutrin) or varenicline (Chantix), which are prescription medicines. They do not contain nicotine. They help you by reducing withdrawal symptoms, such as stress and anxiety. · Some people find hypnosis, acupuncture, and massage helpful for ending the smoking habit. · Eat a healthy diet and get regular exercise.  Having healthy habits will help your body move past its craving for nicotine. · Be prepared to keep trying. Most people are not successful the first few times they try to quit. Do not get mad at yourself if you smoke again. Make a list of things you learned and think about when you want to try again, such as next week, next month, or next year. Where can you learn more? Go to https://NsGenepedouglaseweb.[employer]. org and sign in to your [employer] account. Enter C666 in the ReFashioner box to learn more about \"Stopping Smoking: Care Instructions. \"     If you do not have an account, please click on the \"Sign Up Now\" link. Current as of: March 12, 2020               Content Version: 12.8  © 2006-2021 Healthwise, Incorporated. Care instructions adapted under license by [facility]. If you have questions about a medical condition or this instruction, always ask your healthcare professional. [address] any warranty or liability for your use of this information.

## 2021-04-06 NOTE — PROGRESS NOTES
[facility]      Jose Francisco Oh         [id number]  4/6/2021   Chief Complaint   Patient presents with    Follow-up     SUSANA 6 month follow up with a [address] [employer] pap download        Pt of Dr. Kishor Weiss    PAP Download:   Original or initial AHI: 11.5     Date of initial study: 04/07/2015      Compliant  100%     Noncompliant 0 %     PAP Type AutoSet Level  10   Avg Hrs/Day 8 hours 32 minutes  AHI: 0.4   Recorded compliance dates , 03/06/2021  to 04/04/2021   Machine/Mfg:   [x] ResMed    [] Respironics/Dreamstation   Interface:   [x] Nasal    [] Nasal pillows   [] FFM      Provider:      [x] [facility]     [][employer]     [] [employer]    [] Lizzette Enter    [] [facility]               [] [facility]      [] [employer]    [] [address]      [] Other    Neck Size: 16  Mallampati Mallampati 4  ESS:  5  SAQLI: 84    Here is a scan of the most recent download:            Presentation:   Jeff Westbrook presents for sleep medicine follow up for obstructive sleep apnea  Since the last visit, Jeff Westbrook is doing well with PAP. He is tossing and turning at time. He feels rested since being on Vit D. Equipment issues: The pressure is  acceptable, the mask is acceptable     Sleep issues:  Do you feel better? Yes  More rested? Yes   Better concentration? yes    Progress History:   Since last visit any new medical issues? No  New ER or hospitlal visits? No  Any new or changes in medicines? No  Any new sleep medicines? No    Review of Systems -   Review of Systems   Constitutional: Negative for activity change, appetite change, chills and fever. HENT: Negative for congestion and postnasal drip. Eyes: Negative. Respiratory: Negative for cough, chest tightness, shortness of breath, wheezing and stridor. Cardiovascular: Negative for chest pain and leg swelling. Gastrointestinal: Negative for diarrhea and nausea. Endocrine: Negative. Genitourinary: Negative. Musculoskeletal: Negative.   Negative was advised to continue current positive airway pressure therapy with above described pressure. - He  advised to keep good compliance with current recommended pressure to get optimal results and clinical improvement  - Recommend 7-9 hours of sleep with PAP  - He was advised to call [employer] company regarding supplies if needed.   -He call my office for earlier appointment if needed for worsening of sleep symptoms.   - He was instructed on weight loss  - Arnoldo Sheldon was educated about my impression and plan. Patient verbalizesunderstanding.   We will see Jade Brunner back in: 1 year with download    Information added by my medical assistant/LPN was reviewed today         Tacos Mitchell PA-C, MPAS  4/6/2021

## 2021-04-28 DIAGNOSIS — M25.461 EFFUSION OF RIGHT KNEE: ICD-10-CM

## 2021-04-28 RX ORDER — MELOXICAM 15 MG/1
15 TABLET ORAL DAILY
Qty: 30 TABLET | Refills: 2 | Status: SHIPPED | OUTPATIENT
Start: 2021-04-28 | End: 2022-02-14

## 2021-04-28 NOTE — TELEPHONE ENCOUNTER
Requested Prescriptions     Pending Prescriptions Disp Refills    meloxicam (MOBIC) 15 MG tablet 30 tablet 2     Sig: Take 1 tablet by mouth daily       If no call back pt will check with GAURAV Arrington around 1 pm today

## 2021-12-14 ENCOUNTER — OFFICE VISIT (OUTPATIENT)
Dept: FAMILY MEDICINE CLINIC | Age: 41
End: 2021-12-14
Payer: COMMERCIAL

## 2021-12-14 VITALS
WEIGHT: 275 LBS | SYSTOLIC BLOOD PRESSURE: 128 MMHG | RESPIRATION RATE: 20 BRPM | BODY MASS INDEX: 39.46 KG/M2 | DIASTOLIC BLOOD PRESSURE: 82 MMHG | HEART RATE: 96 BPM

## 2021-12-14 DIAGNOSIS — R63.4 UNINTENDED WEIGHT LOSS: Primary | ICD-10-CM

## 2021-12-14 DIAGNOSIS — E78.00 PURE HYPERCHOLESTEROLEMIA: ICD-10-CM

## 2021-12-14 DIAGNOSIS — R35.89 POLYURIA: ICD-10-CM

## 2021-12-14 DIAGNOSIS — R73.01 IFG (IMPAIRED FASTING GLUCOSE): ICD-10-CM

## 2021-12-14 DIAGNOSIS — H53.9 VISION CHANGES: ICD-10-CM

## 2021-12-14 DIAGNOSIS — K21.9 GASTROESOPHAGEAL REFLUX DISEASE, UNSPECIFIED WHETHER ESOPHAGITIS PRESENT: ICD-10-CM

## 2021-12-14 DIAGNOSIS — R63.1 POLYDIPSIA: ICD-10-CM

## 2021-12-14 PROCEDURE — G8427 DOCREV CUR MEDS BY ELIG CLIN: HCPCS | Performed by: FAMILY MEDICINE

## 2021-12-14 PROCEDURE — 99214 OFFICE O/P EST MOD 30 MIN: CPT | Performed by: FAMILY MEDICINE

## 2021-12-14 PROCEDURE — G8417 CALC BMI ABV UP PARAM F/U: HCPCS | Performed by: FAMILY MEDICINE

## 2021-12-14 PROCEDURE — 4004F PT TOBACCO SCREEN RCVD TLK: CPT | Performed by: FAMILY MEDICINE

## 2021-12-14 PROCEDURE — G8484 FLU IMMUNIZE NO ADMIN: HCPCS | Performed by: FAMILY MEDICINE

## 2021-12-14 RX ORDER — OMEPRAZOLE 40 MG/1
40 CAPSULE, DELAYED RELEASE ORAL
Qty: 30 CAPSULE | Refills: 2 | Status: SHIPPED | OUTPATIENT
Start: 2021-12-14 | End: 2022-03-21

## 2021-12-14 SDOH — ECONOMIC STABILITY: FOOD INSECURITY: WITHIN THE PAST 12 MONTHS, YOU WORRIED THAT YOUR FOOD WOULD RUN OUT BEFORE YOU GOT MONEY TO BUY MORE.: NEVER TRUE

## 2021-12-14 SDOH — ECONOMIC STABILITY: FOOD INSECURITY: WITHIN THE PAST 12 MONTHS, THE FOOD YOU BOUGHT JUST DIDN'T LAST AND YOU DIDN'T HAVE MONEY TO GET MORE.: NEVER TRUE

## 2021-12-14 ASSESSMENT — PATIENT HEALTH QUESTIONNAIRE - PHQ9
1. LITTLE INTEREST OR PLEASURE IN DOING THINGS: 0
2. FEELING DOWN, DEPRESSED OR HOPELESS: 0
SUM OF ALL RESPONSES TO PHQ QUESTIONS 1-9: 0
SUM OF ALL RESPONSES TO PHQ9 QUESTIONS 1 & 2: 0

## 2021-12-14 ASSESSMENT — ENCOUNTER SYMPTOMS
RESPIRATORY NEGATIVE: 1
GASTROINTESTINAL NEGATIVE: 1

## 2021-12-14 ASSESSMENT — SOCIAL DETERMINANTS OF HEALTH (SDOH): HOW HARD IS IT FOR YOU TO PAY FOR THE VERY BASICS LIKE FOOD, HOUSING, MEDICAL CARE, AND HEATING?: NOT HARD AT ALL

## 2021-12-14 NOTE — PROGRESS NOTES
Stiven Oliva (:  1980) is a 42 y.o. male,Established patient, here for evaluation of the following chief complaint(s):  Weight Loss (pt says he has not made any changes with diet or exercise and he has had weight loss, he is not sure if there is reason for concern), Blurred Vision (getting harder and harder to see things after new glasses 1.5yrs ago), Heartburn (more so than normal lately), Fatigue (gets lethargic after eating \"can't keep eyes open\", has to go lay down. Not after all meals, just sometimes), Urinary Frequency (pt use to go 4x a day, now he is urinating 15-20x a day), Cyst (keeps coming and going on back), and Memory Loss (pt says he is forgetful, he has noticed it and girlfriend has noticed it)        Subjective   SUBJECTIVE/OBJECTIVE:  HPI:    Chief Complaint   Patient presents with    Weight Loss     pt says he has not made any changes with diet or exercise and he has had weight loss, he is not sure if there is reason for concern    Blurred Vision     getting harder and harder to see things after new glasses 1.5yrs ago    Heartburn     more so than normal lately    Fatigue     gets lethargic after eating \"can't keep eyes open\", has to go lay down. Not after all meals, just sometimes    Urinary Frequency     pt use to go 4x a day, now he is urinating 15-20x a day    Cyst     keeps coming and going on back    Memory Loss     pt says he is forgetful, he has noticed it and girlfriend has noticed it     Pt here with multiple concerns today. Pt is currently losing weight and not trying. Down 15 lbs in the last 9 mos per our scale. Wt Readings from Last 3 Encounters:   21 275 lb (124.7 kg)   21 296 lb 9.6 oz (134.5 kg)   21 290 lb (131.5 kg)     Also c/o urinary frequency and polydipsia. No hx of HTN.     Lab Results   Component Value Date    LABA1C 5.9 2020    LABA1C 5.3 2016    LABA1C 5.4 2014     Lab Results   Component Value Date [address] SEE BELOW 08/05/2020    CREATININE 0.9 08/05/2020     Would like something for GERD, this is coming more frequently. Acidic foods makes it worse. Patient Active Problem List   Diagnosis    Tobacco abuse    Dyslipidemia    Nasal obstruction    Deviated nasal septum    Chronic maxillary sinusitis    Hypertrophy of inferior nasal turbinate    Excessive cerumen in both ear canals    Shoulder pain    Right shoulder tendonitis    Obstructive sleep apnea    Morbid obesity with BMI of 40.0-44.9, adult (HCC)    Restless sleeper    Claustrophobia    Daytime somnolence    Atypical chest pain    Anxiety    IFG (impaired fasting glucose)    Metabolic syndrome    Vitamin D deficiency    Nasal septal deviation    Niecy bullosa, bilateral middle turbinate     Past Surgical History:   Procedure Laterality Date    [place], [place]    KNEE ARTHROSCOPY  01/2018    SINUS SURGERY  07/16/2014    RT. MAXILLARY ANTROSTOMY WITH TISSUE REMOVAL SEPTOPLASTY TURBINATE REDUCTION EXCISION AND BIOPSLY LESION MOUTH      Social History     Tobacco Use    Smoking status: Current Every Day Smoker     Packs/day: 1.50     Years: 24.00     Pack years: 36.00     Types: Cigarettes     Start date: 12/12/1995    Smokeless tobacco: Never Used   Vaping Use    Vaping Use: Never used   Substance Use Topics    Alcohol use: No     Alcohol/week: 0.0 standard drinks    Drug use: No         Review of Systems   Constitutional: Positive for appetite change and unexpected weight change. HENT: Negative. Eyes: Positive for visual disturbance. Respiratory: Negative. Cardiovascular: Negative. Gastrointestinal: Negative. Endocrine: Positive for polydipsia and polyuria. Genitourinary: Positive for frequency. Negative for dysuria, hematuria and urgency. Musculoskeletal: Negative. Neurological:        Memory difficulties     All other systems reviewed and are negative.          Objective   Physical

## 2021-12-15 ENCOUNTER — NURSE ONLY (OUTPATIENT)
Dept: LAB | Age: 41
End: 2021-12-15

## 2021-12-15 DIAGNOSIS — R73.01 IFG (IMPAIRED FASTING GLUCOSE): ICD-10-CM

## 2021-12-15 DIAGNOSIS — R63.4 UNINTENDED WEIGHT LOSS: ICD-10-CM

## 2021-12-15 DIAGNOSIS — E78.00 PURE HYPERCHOLESTEROLEMIA: ICD-10-CM

## 2021-12-15 DIAGNOSIS — R35.89 POLYURIA: ICD-10-CM

## 2021-12-15 LAB
ALBUMIN SERPL-MCNC: 4.6 G/DL (ref 3.5–5.1)
ALP BLD-CCNC: 149 U/L (ref 38–126)
ALT SERPL-CCNC: 86 U/L (ref 11–66)
ANION GAP SERPL CALCULATED.3IONS-SCNC: 15 MEQ/L (ref 8–16)
AST SERPL-CCNC: 60 U/L (ref 5–40)
AVERAGE GLUCOSE: 237 MG/DL (ref 70–126)
BACTERIA: ABNORMAL /HPF
BASOPHILS # BLD: 1 %
BASOPHILS ABSOLUTE: 0.1 THOU/MM3 (ref 0–0.1)
BILIRUB SERPL-MCNC: 0.7 MG/DL (ref 0.3–1.2)
BILIRUBIN URINE: NEGATIVE
BLOOD, URINE: NEGATIVE
BUN BLDV-MCNC: 11 MG/DL (ref 7–22)
CALCIUM SERPL-MCNC: 10.2 MG/DL (ref 8.5–10.5)
CASTS 2: ABNORMAL /LPF
CASTS UA: ABNORMAL /LPF
CHARACTER, URINE: CLEAR
CHLORIDE BLD-SCNC: 95 MEQ/L (ref 98–111)
CHOLESTEROL, TOTAL: 284 MG/DL (ref 100–199)
CO2: 24 MEQ/L (ref 23–33)
COLOR: YELLOW
CREAT SERPL-MCNC: 0.9 MG/DL (ref 0.4–1.2)
CRYSTALS, UA: ABNORMAL
EOSINOPHIL # BLD: 2.3 %
EOSINOPHILS ABSOLUTE: 0.2 THOU/MM3 (ref 0–0.4)
EPITHELIAL CELLS, UA: ABNORMAL /HPF
ERYTHROCYTE [DISTWIDTH] IN BLOOD BY AUTOMATED COUNT: 13.4 % (ref 11.5–14.5)
ERYTHROCYTE [DISTWIDTH] IN BLOOD BY AUTOMATED COUNT: 44.9 FL (ref 35–45)
GFR SERPL CREATININE-BSD FRML MDRD: > 90 ML/MIN/1.73M2
GLUCOSE BLD-MCNC: 355 MG/DL (ref 70–108)
GLUCOSE URINE: >= 1000 MG/DL
HBA1C MFR BLD: 9.9 % (ref 4.4–6.4)
HCT VFR BLD CALC: 49.1 % (ref 42–52)
HDLC SERPL-MCNC: 28 MG/DL
HEMOGLOBIN: 16.9 GM/DL (ref 14–18)
IMMATURE GRANS (ABS): 0.08 THOU/MM3 (ref 0–0.07)
IMMATURE GRANULOCYTES: 0.9 %
KETONES, URINE: 15
LDL CHOLESTEROL CALCULATED: ABNORMAL MG/DL
LEUKOCYTE ESTERASE, URINE: NEGATIVE
LYMPHOCYTES # BLD: 27.5 %
LYMPHOCYTES ABSOLUTE: 2.6 THOU/MM3 (ref 1–4.8)
MCH RBC QN AUTO: 31.4 PG (ref 26–33)
MCHC RBC AUTO-ENTMCNC: 34.4 GM/DL (ref 32.2–35.5)
MCV RBC AUTO: 91.1 FL (ref 80–94)
MISCELLANEOUS 2: ABNORMAL
MONOCYTES # BLD: 5.9 %
MONOCYTES ABSOLUTE: 0.5 THOU/MM3 (ref 0.4–1.3)
NITRITE, URINE: NEGATIVE
NUCLEATED RED BLOOD CELLS: 0 /100 WBC
PH UA: 5.5 (ref 5–9)
PLATELET # BLD: 251 THOU/MM3 (ref 130–400)
PMV BLD AUTO: 10.2 FL (ref 9.4–12.4)
POTASSIUM SERPL-SCNC: 4.1 MEQ/L (ref 3.5–5.2)
PROTEIN UA: 30
RBC # BLD: 5.39 MILL/MM3 (ref 4.7–6.1)
RBC URINE: ABNORMAL /HPF
RENAL EPITHELIAL, UA: ABNORMAL
SEG NEUTROPHILS: 62.4 %
SEGMENTED NEUTROPHILS ABSOLUTE COUNT: 5.8 THOU/MM3 (ref 1.8–7.7)
SODIUM BLD-SCNC: 134 MEQ/L (ref 135–145)
SPECIFIC GRAVITY, URINE: > 1.03 (ref 1–1.03)
TOTAL PROTEIN: 7.5 G/DL (ref 6.1–8)
TRIGL SERPL-MCNC: 1093 MG/DL (ref 0–199)
TSH SERPL DL<=0.05 MIU/L-ACNC: 1.82 UIU/ML (ref 0.4–4.2)
UROBILINOGEN, URINE: 0.2 EU/DL (ref 0–1)
WBC # BLD: 9.3 THOU/MM3 (ref 4.8–10.8)
WBC UA: ABNORMAL /HPF
YEAST: ABNORMAL

## 2021-12-16 LAB — LDL CHOLESTEROL DIRECT: 100.74 MG/DL

## 2021-12-17 ENCOUNTER — OFFICE VISIT (OUTPATIENT)
Dept: FAMILY MEDICINE CLINIC | Age: 41
End: 2021-12-17
Payer: COMMERCIAL

## 2021-12-17 VITALS
SYSTOLIC BLOOD PRESSURE: 138 MMHG | RESPIRATION RATE: 20 BRPM | WEIGHT: 270.1 LBS | DIASTOLIC BLOOD PRESSURE: 84 MMHG | HEART RATE: 76 BPM | BODY MASS INDEX: 38.76 KG/M2

## 2021-12-17 DIAGNOSIS — E66.9 OBESITY (BMI 30-39.9): ICD-10-CM

## 2021-12-17 DIAGNOSIS — E78.00 PURE HYPERCHOLESTEROLEMIA: ICD-10-CM

## 2021-12-17 DIAGNOSIS — E78.1 HYPERTRIGLYCERIDEMIA: ICD-10-CM

## 2021-12-17 DIAGNOSIS — H53.9 VISION CHANGES: ICD-10-CM

## 2021-12-17 PROCEDURE — 3046F HEMOGLOBIN A1C LEVEL >9.0%: CPT | Performed by: FAMILY MEDICINE

## 2021-12-17 PROCEDURE — 4004F PT TOBACCO SCREEN RCVD TLK: CPT | Performed by: FAMILY MEDICINE

## 2021-12-17 PROCEDURE — 99214 OFFICE O/P EST MOD 30 MIN: CPT | Performed by: FAMILY MEDICINE

## 2021-12-17 PROCEDURE — 2022F DILAT RTA XM EVC RTNOPTHY: CPT | Performed by: FAMILY MEDICINE

## 2021-12-17 PROCEDURE — G8484 FLU IMMUNIZE NO ADMIN: HCPCS | Performed by: FAMILY MEDICINE

## 2021-12-17 PROCEDURE — G8427 DOCREV CUR MEDS BY ELIG CLIN: HCPCS | Performed by: FAMILY MEDICINE

## 2021-12-17 PROCEDURE — G8417 CALC BMI ABV UP PARAM F/U: HCPCS | Performed by: FAMILY MEDICINE

## 2021-12-17 RX ORDER — LANCETS
EACH MISCELLANEOUS
Qty: 100 EACH | Refills: 3 | Status: SHIPPED | OUTPATIENT
Start: 2021-12-17 | End: 2022-06-24

## 2021-12-17 RX ORDER — BLOOD-GLUCOSE METER
EACH MISCELLANEOUS
Qty: 1 KIT | Refills: 0 | Status: SHIPPED | OUTPATIENT
Start: 2021-12-17

## 2021-12-17 RX ORDER — ATORVASTATIN CALCIUM 20 MG/1
20 TABLET, FILM COATED ORAL NIGHTLY
Qty: 30 TABLET | Refills: 2 | Status: SHIPPED | OUTPATIENT
Start: 2021-12-17 | End: 2022-03-21

## 2021-12-17 RX ORDER — FENOFIBRATE 160 MG/1
160 TABLET ORAL DAILY
Qty: 90 TABLET | Refills: 1 | Status: SHIPPED | OUTPATIENT
Start: 2021-12-17 | End: 2021-12-19

## 2021-12-17 RX ORDER — ASPIRIN 81 MG/1
81 TABLET ORAL DAILY
Qty: 30 TABLET | Refills: 2 | Status: SHIPPED | OUTPATIENT
Start: 2021-12-17 | End: 2022-03-21

## 2021-12-17 RX ORDER — BLOOD SUGAR DIAGNOSTIC
STRIP MISCELLANEOUS
Qty: 100 EACH | Refills: 3 | Status: SHIPPED | OUTPATIENT
Start: 2021-12-17 | End: 2022-01-03 | Stop reason: SDUPTHER

## 2021-12-17 RX ORDER — LISINOPRIL 5 MG/1
5 TABLET ORAL DAILY
Qty: 30 TABLET | Refills: 2 | Status: SHIPPED | OUTPATIENT
Start: 2021-12-17 | End: 2022-03-21

## 2021-12-17 RX ORDER — PIOGLITAZONE HCL AND METFORMIN HCL 500; 15 MG/1; MG/1
1 TABLET ORAL 2 TIMES DAILY WITH MEALS
Qty: 60 TABLET | Refills: 2 | Status: SHIPPED | OUTPATIENT
Start: 2021-12-17 | End: 2022-03-21

## 2021-12-17 ASSESSMENT — ENCOUNTER SYMPTOMS
RESPIRATORY NEGATIVE: 1
GASTROINTESTINAL NEGATIVE: 1

## 2021-12-17 ASSESSMENT — PATIENT HEALTH QUESTIONNAIRE - PHQ9
2. FEELING DOWN, DEPRESSED OR HOPELESS: 0
SUM OF ALL RESPONSES TO PHQ QUESTIONS 1-9: 0
SUM OF ALL RESPONSES TO PHQ9 QUESTIONS 1 & 2: 0
1. LITTLE INTEREST OR PLEASURE IN DOING THINGS: 0

## 2021-12-17 NOTE — PATIENT INSTRUCTIONS
You may receive a survey regarding the care you received during your visit. Your input is valuable to us. We encourage you to complete and return your survey. We hope you will choose us in the future for your healthcare needs. Tobacco Cessation Programs     Telephonic behavior modification  Dontae Carbajal ([phone])   Counseling service for those who are ready to quit using tobacco.     Available for uninsured [address] [place] [address] residents, [place] recipients, pregnant women, or patients whose health plans or employers are members of the [address] [place] [address] behavior modification   http://[place]. Quitlogix. org   Online support program to help patients through each step of the quitting process. Available 24 hours a day 7 days a week. Provides up to date researched based tool, step-by-step guides, and motivational messages. Online behavior modification   www.lungusa.org/stop-smoking/how-to-quit   HelpLine: [facility] (362-3326)   Email questions to:  Get@[employer]. MOBEXO    Website offers resources to help tobacco users figure out their reasons for quitting and then take the big step of quitting for good. Hypnosis   Location: [address] [facility], [place]   Contact: Ping Tate, PhD at [phone]   Hypnosis for tobacco cessation   Cost $225 for the initial session and $175 for each session afterwards. Most patients require 6-8 sessions. There is the option to submit through the patients insurance. Hypnosis and behavior modification   Location: [place] [address],  [address] [place], [place]   Contact: Pan Clark, PhD at [phone]  [facility] and hypnosis for nicotine addition   Cost: For uninsured patients:  Please call above phone number  Cost for insured patients depends on patients insurance plan.     Behavior modification   Location: [facility], [address] [place], [address]: [address] four one-on-one appointments between the patient and a respiratory therapist.  The four appointments span over three weeks. The respiratory therapist schedules one of the appointments to occur 48 hours after the patients quit date.  Cost $100 total for the four sessions.   Tobacco cessation products are not included in the cost and are not provided by [facility].     [facility]

## 2021-12-17 NOTE — PROGRESS NOTES
Ivone Villafana (:  1980) is a 42 y.o. male,Established patient, here for evaluation of the following chief complaint(s):  Follow-up and Discuss Labs        Subjective   SUBJECTIVE/OBJECTIVE:  HPI:    Chief Complaint   Patient presents with    Follow-up    Discuss Labs     Presents to review labs. New onset DM. Lab Results   Component Value Date    LABA1C 9.9 (H) 12/15/2021    LABA1C 5.9 2020    LABA1C 5.3 2016     Lab Results   Component Value Date    LDLCALC SEE BELOW 12/15/2021    CREATININE 0.9 12/15/2021       BP Readings from Last 3 Encounters:   21 138/84   21 128/82   21 138/86     The 10-year ASCVD risk score (Miladis Sanabria et al., 2013) is: 19.7%    Values used to calculate the score:      Age: 42 years      Sex: Male      Is Non- : No      Diabetic: No      Tobacco smoker: Yes      Systolic Blood Pressure: 106 mmHg      Is BP treated: No      HDL Cholesterol: 28 mg/dL      Total Cholesterol: 284 mg/dL      Review of Systems   Constitutional: Negative. HENT: Negative. Eyes: Positive for visual disturbance. Respiratory: Negative. Cardiovascular: Negative. Gastrointestinal: Negative. Endocrine: Positive for polydipsia and polyuria. Musculoskeletal: Negative. All other systems reviewed and are negative. Objective   Physical Exam  Vitals and nursing note reviewed. Constitutional:       General: He is not in acute distress. Appearance: Normal appearance. He is well-developed. HENT:      Head: Normocephalic and atraumatic. Right Ear: Tympanic membrane normal.      Left Ear: Tympanic membrane normal.   Eyes:      Conjunctiva/sclera: Conjunctivae normal.   Cardiovascular:      Rate and Rhythm: Normal rate and regular rhythm. Heart sounds: Normal heart sounds. No murmur heard. Pulmonary:      Effort: Pulmonary effort is normal.      Breath sounds: Normal breath sounds.  No wheezing, rhonchi or rales.   Abdominal:      General: There is no distension. Musculoskeletal:      Cervical back: Neck supple. Skin:     General: Skin is warm and dry. Findings: No rash (on exposed surfaces). Neurological:      General: No focal deficit present. Mental Status: He is alert. Psychiatric:         Attention and Perception: Attention normal.         Mood and Affect: Mood normal.         Speech: Speech normal.         Behavior: Behavior normal. Behavior is cooperative. Thought Content: Thought content normal.         Judgment: Judgment normal.               ASSESSMENT/PLAN:  1. Uncontrolled type 2 diabetes mellitus, without long-term current use of insulin ([facility])  -      DIABETES FOOT EXAM  -     pioglitazone-metFORMIN (ACTOPLUS MET)  MG per tablet; Take 1 tablet by mouth 2 times daily (with meals), Disp-60 tablet, R-2Normal  -     lisinopril (PRINIVIL;ZESTRIL) 5 MG tablet; Take 1 tablet by mouth daily, Disp-30 tablet, R-2Normal  -     aspirin EC 81 MG EC tablet; Take 1 tablet by mouth daily, Disp-30 tablet, R-2Normal  -     Blood Glucose Monitoring Suppl ([employer] SYSTEM) w/Device KIT; Disp-1 kit, R-0, NormalCheck sugars daily. Dx: E11.9  -     blood glucose test strips (ONETOUCH VERIO) strip; Check sugars daily. Dx: E11.9, Disp-100 each, R-3Normal  -     ONE TOUCH ULTRASOFT LANCETS MISC; Disp-100 each, R-3, NormalCheck sugars daily. Dx: E11.9  -     [place]  2. Pure hypercholesterolemia  -     atorvastatin (LIPITOR) 20 MG tablet; Take 1 tablet by mouth nightly, Disp-30 tablet, R-2Normal  3. Hypertriglyceridemia  -     fenofibrate (TRIGLIDE) 160 MG tablet; Take 1 tablet by mouth daily, Disp-90 tablet, R-1Normal  4. Vision changes  5. Obesity (BMI 30-39.9)    -  Orders above  -  Dramatic lifestyle changes discussed  -  Check sugars daily and record    Return in about 2 weeks (around 12/31/2021) for DM2.              An electronic signature was used to authenticate this note.     --Ann Harry, DO

## 2021-12-18 ENCOUNTER — TELEPHONE (OUTPATIENT)
Dept: FAMILY MEDICINE CLINIC | Age: 41
End: 2021-12-18

## 2021-12-18 NOTE — TELEPHONE ENCOUNTER
----- Message from Hayes Greenwood sent at 12/17/2021 12:28 PM EST -----  Subject: Refill Request    QUESTIONS  Name of Medication? fenofibrate (TRIGLIDE) 160 MG tablet  Patient-reported dosage and instructions? Take 1 tablet by mouth daily  How many days do you have left? 0  Preferred Pharmacy? [address] Pharmacy phone number (if available)? [phone]  Additional Information for Provider? needs to change brands for insurance   ---------------------------------------------------------------------------  --------------  CALL BACK INFO  What is the best way for the office to contact you? OK to leave message on   voicemail  Preferred Call Back Phone Number?  [phone]

## 2021-12-19 RX ORDER — FENOFIBRATE 145 MG/1
145 TABLET, COATED ORAL DAILY
Qty: 30 TABLET | Refills: 2 | Status: SHIPPED | OUTPATIENT
Start: 2021-12-19 | End: 2022-03-21

## 2022-01-03 ENCOUNTER — TELEPHONE (OUTPATIENT)
Dept: FAMILY MEDICINE CLINIC | Age: 42
End: 2022-01-03

## 2022-01-03 RX ORDER — BLOOD SUGAR DIAGNOSTIC
STRIP MISCELLANEOUS
Qty: 100 EACH | Refills: 3 | Status: SHIPPED | OUTPATIENT
Start: 2022-01-03 | End: 2022-01-04 | Stop reason: SDUPTHER

## 2022-01-03 NOTE — TELEPHONE ENCOUNTER
The patient called in and is requesting a new script for his DM testing strips to be sent to [address] [place] [address]. He stated that the last script was ordered for him to test daily but the log that he was give has him testing 6-7 times a day so he is out of strips. Please advise.     The patient uses [facility]    If no call back the patient will check with his pharmacy after 3pm

## 2022-01-04 ENCOUNTER — OFFICE VISIT (OUTPATIENT)
Dept: FAMILY MEDICINE CLINIC | Age: 42
End: 2022-01-04
Payer: COMMERCIAL

## 2022-01-04 VITALS
HEART RATE: 81 BPM | WEIGHT: 272.9 LBS | DIASTOLIC BLOOD PRESSURE: 80 MMHG | RESPIRATION RATE: 16 BRPM | BODY MASS INDEX: 39.16 KG/M2 | OXYGEN SATURATION: 99 % | SYSTOLIC BLOOD PRESSURE: 120 MMHG

## 2022-01-04 DIAGNOSIS — E66.9 OBESITY (BMI 30-39.9): ICD-10-CM

## 2022-01-04 DIAGNOSIS — E78.1 HYPERTRIGLYCERIDEMIA: ICD-10-CM

## 2022-01-04 DIAGNOSIS — E78.00 PURE HYPERCHOLESTEROLEMIA: ICD-10-CM

## 2022-01-04 DIAGNOSIS — K21.9 GASTROESOPHAGEAL REFLUX DISEASE, UNSPECIFIED WHETHER ESOPHAGITIS PRESENT: ICD-10-CM

## 2022-01-04 PROCEDURE — 4004F PT TOBACCO SCREEN RCVD TLK: CPT | Performed by: FAMILY MEDICINE

## 2022-01-04 PROCEDURE — 2022F DILAT RTA XM EVC RTNOPTHY: CPT | Performed by: FAMILY MEDICINE

## 2022-01-04 PROCEDURE — G8484 FLU IMMUNIZE NO ADMIN: HCPCS | Performed by: FAMILY MEDICINE

## 2022-01-04 PROCEDURE — G8427 DOCREV CUR MEDS BY ELIG CLIN: HCPCS | Performed by: FAMILY MEDICINE

## 2022-01-04 PROCEDURE — 99214 OFFICE O/P EST MOD 30 MIN: CPT | Performed by: FAMILY MEDICINE

## 2022-01-04 PROCEDURE — G8417 CALC BMI ABV UP PARAM F/U: HCPCS | Performed by: FAMILY MEDICINE

## 2022-01-04 PROCEDURE — 3046F HEMOGLOBIN A1C LEVEL >9.0%: CPT | Performed by: FAMILY MEDICINE

## 2022-01-04 RX ORDER — BLOOD SUGAR DIAGNOSTIC
STRIP MISCELLANEOUS
Qty: 200 EACH | Refills: 3 | Status: SHIPPED | OUTPATIENT
Start: 2022-01-04

## 2022-01-04 ASSESSMENT — PATIENT HEALTH QUESTIONNAIRE - PHQ9
1. LITTLE INTEREST OR PLEASURE IN DOING THINGS: 0
SUM OF ALL RESPONSES TO PHQ QUESTIONS 1-9: 0
SUM OF ALL RESPONSES TO PHQ QUESTIONS 1-9: 0
2. FEELING DOWN, DEPRESSED OR HOPELESS: 0
SUM OF ALL RESPONSES TO PHQ QUESTIONS 1-9: 0
SUM OF ALL RESPONSES TO PHQ QUESTIONS 1-9: 0
SUM OF ALL RESPONSES TO PHQ9 QUESTIONS 1 & 2: 0

## 2022-01-04 ASSESSMENT — ENCOUNTER SYMPTOMS
RESPIRATORY NEGATIVE: 1
GASTROINTESTINAL NEGATIVE: 1

## 2022-01-04 NOTE — PROGRESS NOTES
Que Rapp (:  1980) is a 42 y.o. male,Established patient, here for evaluation of the following chief complaint(s):  Diabetes (2 week fup-), Other (vision changes still present blurry with reading, taking glasses off can see distance- will be seeing eye doctor 22), and Flu Vaccine (declines today )        Subjective   SUBJECTIVE/OBJECTIVE:  HPI:    Chief Complaint   Patient presents with    Diabetes     2 week fup-    Other     vision changes still present blurry with reading, taking glasses off can see distance- will be seeing eye doctor 22    Flu Vaccine     declines today      2 week eval.      Sugars are starting to come down. Was checking his sugars 6-7 times per day and ran out of strips. Sugars much improved. Now in the 140's. Tolerating meds fine. Wt Readings from Last 3 Encounters:   22 272 lb 14.4 oz (123.8 kg)   21 270 lb 1.6 oz (122.5 kg)   21 275 lb (124.7 kg)     BP Readings from Last 3 Encounters:   22 120/80   21 138/84   21 128/82     No improvement in vision, has Ophtho appt on . Patient Active Problem List   Diagnosis    Tobacco abuse    Dyslipidemia    Nasal obstruction    Deviated nasal septum    Chronic maxillary sinusitis    Hypertrophy of inferior nasal turbinate    Excessive cerumen in both ear canals    Shoulder pain    Right shoulder tendonitis    Obstructive sleep apnea    Morbid obesity with BMI of 40.0-44.9, adult (HCC)    Restless sleeper    Claustrophobia    Daytime somnolence    Atypical chest pain    Anxiety    IFG (impaired fasting glucose)    Metabolic syndrome    Vitamin D deficiency    Nasal septal deviation    Niecy bullosa, bilateral middle turbinate     Past Surgical History:   Procedure Laterality Date    APP[place]Y      [place], [place]    KNEE ARTHROSCOPY  2018    SINUS SURGERY  2014    RT.  MAXILLARY ANTROSTOMY WITH TISSUE REMOVAL SEPTOPLASTY TURBINATE REDUCTION EXCISION AND BIOPSLY LESION MOUTH      Social History     Tobacco Use    Smoking status: Current Every Day Smoker     Packs/day: 1.50     Years: 24.00     Pack years: 36.00     Types: Cigarettes     Start date: 12/12/1995    Smokeless tobacco: Never Used   Vaping Use    Vaping Use: Never used   Substance Use Topics    Alcohol use: No     Alcohol/week: 0.0 standard drinks    Drug use: No         Review of Systems   Constitutional: Negative. HENT: Negative. Respiratory: Negative. Cardiovascular: Negative. Gastrointestinal: Negative. Musculoskeletal: Negative. All other systems reviewed and are negative. Objective   Physical Exam  Vitals and nursing note reviewed. Constitutional:       General: He is not in acute distress. Appearance: Normal appearance. He is well-developed. HENT:      Head: Normocephalic and atraumatic. Right Ear: Tympanic membrane normal.      Left Ear: Tympanic membrane normal.   Eyes:      Conjunctiva/sclera: Conjunctivae normal.   Cardiovascular:      Rate and Rhythm: Normal rate and regular rhythm. Heart sounds: Normal heart sounds. No murmur heard. Pulmonary:      Effort: Pulmonary effort is normal.      Breath sounds: Normal breath sounds. No wheezing, rhonchi or rales. Abdominal:      General: There is no distension. Musculoskeletal:      Cervical back: Neck supple. Skin:     General: Skin is warm and dry. Findings: No rash (on exposed surfaces). Neurological:      General: No focal deficit present. Mental Status: He is alert. Psychiatric:         Attention and Perception: Attention normal.         Mood and Affect: Mood normal.         Speech: Speech normal.         Behavior: Behavior normal. Behavior is cooperative. Thought Content: Thought content normal.         Judgment: Judgment normal.               ASSESSMENT/PLAN:  1.  Uncontrolled type 2 diabetes mellitus, without long-term current use of insulin ([facility] [address]  -     Comprehensive Metabolic Panel; Future  -     Hemoglobin A1C; Future  -     Microalbumin / Creatinine Urine Ratio; Future  2. Pure hypercholesterolemia  -     Lipid Panel w/ Reflex Direct LDL; Future  -     Comprehensive Metabolic Panel; Future  3. Hypertriglyceridemia  4. Obesity (BMI 30-39.9)  5. Gastroesophageal reflux disease, unspecified whether esophagitis present    -  Sugars much improved, continue current meds  -  Recheck labs 3 mos    Return in about 3 months (around 4/4/2022) for DM2. An electronic signature was used to authenticate this note.     --Criselda Chinchilla, DO

## 2022-02-01 ENCOUNTER — OFFICE VISIT (OUTPATIENT)
Dept: INTERNAL MEDICINE CLINIC | Age: 42
End: 2022-02-01
Payer: COMMERCIAL

## 2022-02-01 VITALS
TEMPERATURE: 97.2 F | BODY MASS INDEX: 39.86 KG/M2 | HEART RATE: 80 BPM | SYSTOLIC BLOOD PRESSURE: 133 MMHG | WEIGHT: 278.4 LBS | HEIGHT: 70 IN | DIASTOLIC BLOOD PRESSURE: 75 MMHG

## 2022-02-01 DIAGNOSIS — E11.9 TYPE 2 DIABETES MELLITUS WITHOUT COMPLICATION, WITHOUT LONG-TERM CURRENT USE OF INSULIN (HCC): ICD-10-CM

## 2022-02-01 PROCEDURE — G0108 DIAB MANAGE TRN  PER INDIV: HCPCS | Performed by: INTERNAL MEDICINE

## 2022-02-01 NOTE — PROGRESS NOTES
Diabetes Mellitus Type II, Initial Visit:   Dr. Leonard Andrews  Patient here for an initial evaluation of Type 2 diabetes mellitus. Current symptoms/problems include hyperglycemia, polydipsia, polyuria and visual disturbances and have been improving. Symptoms have been present for several weeks. The patient was initially diagnosed with Type 2 diabetes mellitus:  11/2021. Known diabetic complications: none  Cardiovascular risk factors: diabetes mellitus, dyslipidemia, family history of premature cardiovascular disease, obesity (BMI >= 30 kg/m2), sedentary lifestyle and smoking/ tobacco exposure  Current diabetic medications include ActoPlus. Eye exam current (within one year): yes Dr. Cheryl Beaver 1/2022. Appt 3/22/22  Weight trend: decreasing steadily 20-25# in the past year  Prior visit with dietician: no  Current diet: B HN cherrios                       L fruits/ veggies  Or cheese/ crackers                       D nasra steak/ m potato 1/3 cup corn/ asparagus/ milk  Current exercise: ADL's; limit by knee issue  Current monitoring regimen: home blood tests - 1 times daily  Home blood sugar records: fasting range: 116-157  Any episodes of hypoglycemia? no  Is He on ACE inhibitor or angiotensin II receptor blocker? Yes   lisinopril (Prinivil)    Focus  Initial visit for Diabetes education. Recent A1C 9.9%(at diagnosis). Taking ActoPlus for approx 5-6 weeks. Testing FBS's; results  that are close to goal. He is restricting carb intake; but is still learning carb contents. Some meals are higher carbs than intended. He was waiting to start exercise program to see how the medication did. He is encouraged to get exercise in place now. Continued weight loss will help facilitate better blood sugar control. Gabo mentioned goal to stop current medication therapy-discussed benefits/ protection offered by this therapy for long term goals. Much encouragement given for his efforts thus far.  Follow up 1 week with dietician. Plan  Reviewed physiology of Diabetes; exercise; carbs; BG goals; timing of SGM; medication actions/ benefits  Good job with your meal planning!!  3 meals per day; 30-60 grams carbohydrate per meal               If you can be satisfied with 30 grams of carbohydrate at               Each meal--this is less work for your pancreas and less potential                     For more medication  Keep checking your blood sugars every day            Take 3 days and skip fasting and take 2 hours after eating            Goal before meals /130             2hrs after eating --under 150/160  Keep a log of what you are eating and bring to your appointment with      the dietician--what you eat and how much --measure when you can  Get your exercise plan going--slow and steady. Do not injure your knees,                  Etc.  Build on your starting point--increase time and/ or intensity  Meter download, medications, PMH and nursing assessment reviewed. Bri Luis Felipe states He is willing to participate in this plan of care and verbalized understanding of all instructions provided. Teach back used to verify comprehension. Total time involved in direct patient education: 60 minutes.

## 2022-02-01 NOTE — PATIENT INSTRUCTIONS
Good job with your meal planning!!  3 meals per day; 30-60 grams carbohydrate per meal               If you can be satisfied with 30 grams of carbohydrate at               Each meal--this is less work for your pancreas and less potential                     For more medication  Keep checking your blood sugars every day            Take 3 days and skip fasting and take 2 hours after eating            Goal before meals /130             2hrs after eating --under 150/160  Keep a log of what you are eating and bring to your appointment with      the dietician--what you eat and how much --measure when you can  Get your exercise plan going--slow and steady.  Do not injure your knees,                  Etc.  Build on your starting point--increase time and/ or intensity

## 2022-02-14 DIAGNOSIS — M25.461 EFFUSION OF RIGHT KNEE: ICD-10-CM

## 2022-02-14 RX ORDER — MELOXICAM 15 MG/1
TABLET ORAL
Qty: 90 TABLET | Refills: 0 | Status: SHIPPED | OUTPATIENT
Start: 2022-02-14 | End: 2022-04-05 | Stop reason: SDUPTHER

## 2022-02-15 ENCOUNTER — OFFICE VISIT (OUTPATIENT)
Dept: INTERNAL MEDICINE CLINIC | Age: 42
End: 2022-02-15
Payer: COMMERCIAL

## 2022-02-15 VITALS — TEMPERATURE: 97.8 F | WEIGHT: 279 LBS | BODY MASS INDEX: 39.94 KG/M2 | HEIGHT: 70 IN

## 2022-02-15 DIAGNOSIS — E11.65 UNCONTROLLED TYPE 2 DIABETES MELLITUS WITH HYPERGLYCEMIA (HCC): ICD-10-CM

## 2022-02-15 PROCEDURE — 97802 MEDICAL NUTRITION INDIV IN: CPT | Performed by: DIETITIAN, REGISTERED

## 2022-02-15 NOTE — PATIENT INSTRUCTIONS
1. )  Get familiar with reading the nutrition facts label by looking at serving size and total carbohydrates. - Without a label refer to your carb count guide booklet. 2.)  Measure foods for accuracy in carb counting.    3.)  Healthy carb choices:  whole grains, whole wheat pasta, starchy vegetables, fresh fruit and lowfat/non-fat milk and yogurt. 4.)  Your meal plan is found on the inside cover of your carb counting guide booklet:  1st hernandez or Brkf:  45-60 gms carbohydrates + 1-2 oz protein  2nd meal or Lunch: 30 gms carbohydrates + 2 oz protein + non-starchy veggies  3rd meal or Dinner:  45-60 gms carbohydrates + 3 oz protein + non- starchy veggies    Snack time:  15 - 20 gms carbohydrates + 1 oz protein - having 2-3 snacks/day    5.)  Choose lean protein most of the time and Cut in 1/2 added fats to help with weight loss efforts. 6.) Bring a 1-2 week food log and meter to next dietitian appt. Thanks. Check BS:  1-2x/day at various times of the day  Fasting BS (1st thing in the morning) or before a meal (at least 4 hour since last ate), BS goal:  90 - 130  2 hours after the start of a meal, BS goal:  100 - 150    7.)  Think of an exercise plan. - Maybe start with 15 minute walk 3x/week.

## 2022-02-15 NOTE — PROGRESS NOTES
[address] [place] [address]. [address] [place] [address] Dwayne., MelvinMelissa [facility], [facility] [address]  [phone] (phone)  [phone] (fax)    Patient Name: Regulo Jacobs. Date of Birth: [id number]. MRN: [id number]      Assessment: Patient is a 36 y.o. male seen for Initial MNT visit for Type 2 Diabetes. -Nutritionally relevant labs:   Lab Results   Component Value Date/Time    LABA1C 9.9 (H) 12/15/2021 09:09 AM    LABA1C 5.9 08/05/2020 02:50 PM    LABA1C 5.3 09/28/2016 10:00 AM    GLUCOSE 355 (H) 12/15/2021 09:09 AM    GLUCOSE 107 08/05/2020 02:50 PM    CHOL 284 (H) 12/15/2021 09:09 AM    HDL 28 12/15/2021 09:09 AM    LDLCALC SEE BELOW 12/15/2021 09:09 AM    TRIG 1,093 (H) 12/15/2021 09:09 AM     -Blood sugar trends: Checks once daily. Usually FBS:  98 - 121  Other evening BS:  114    -Food recall: Verbal recall taken during visit  Breakfast: 8am -  Bowl of cereal (honey nut cheerios) with 2% milk. Izabella Connolly breakfast (once every 2 weeks)  Snack: cup of mixed grapes and pineapples with beef/turkey jerky meat sticks (1-2 pieces)   Lunch: usually skips lunch and snacks until dinner time (usually cup of mixed fruit with 1-2 pieces of beef/turkey jerky sticks)   Dinner: 5 pm - pot roast, steak, pork chops (meats usually baked or cooked in air fryer). Usually mac and cheese or mashed potatoes for his main starch with side of veggies. Snack: beef sticks, mixed fruit bowl   Main Beverages: Patient reported that he regularly drinks 2% milk (more than 1 cup at a time). Diet cranberry juice occasionally. 5-6x bottles (12-20 oz) plain water per day. Patient reported drinking 1-2 L pop per day prior to Diabetes diagnosis but has since changed this behavior and instead focuses on drinking more water everyday. Patient lives with girlfriend and three children.   Patient and girlfriend does the grocery and cooking for the household  Currently eats out about 1-2 x per week tries to opt for more balanced meal options  Patient denies any food allergies or aversions. Patient denies any acid reflux    -Impression of Dietary Intake: inconsistent meal patterns on average, 2 meals per day, low fiber, high fat/ cholesterol, high salt, lacking routine intake of fruits and vegetables    Patient denies engaging in any purposeful exercise activities except for normal activities of daily living  Work is sedentary. Patient reports working from home 2-3 days a week and occassionally drives to work sites. Current Outpatient Medications on File Prior to Visit   Medication Sig Dispense Refill    meloxicam (MOBIC) 15 MG tablet take 1 tablet by mouth once daily 90 tablet 0    blood glucose test strips (ONETOUCH VERIO) strip Check sugars BID. Dx: E11.9 200 each 3    fenofibrate (TRICOR) 145 MG tablet Take 1 tablet by mouth daily 30 tablet 2    pioglitazone-metFORMIN (ACTOPLUS MET)  MG per tablet Take 1 tablet by mouth 2 times daily (with meals) 60 tablet 2    atorvastatin (LIPITOR) 20 MG tablet Take 1 tablet by mouth nightly 30 tablet 2    lisinopril (PRINIVIL;ZESTRIL) 5 MG tablet Take 1 tablet by mouth daily 30 tablet 2    aspirin EC 81 MG EC tablet Take 1 tablet by mouth daily 30 tablet 2    Blood Glucose Monitoring Suppl (ONETOUCH VERIO IQ SYSTEM) w/Device KIT Check sugars daily. Dx: E11.9 1 kit 0    ONE TOUCH ULTRASOFT LANCETS MISC Check sugars daily. Dx: E11.9 100 each 3    omeprazole (PRILOSEC) 40 MG delayed release capsule Take 1 capsule by mouth every morning (before breakfast) 30 capsule 2    Cholecalciferol 50 MCG (2000 UT) CAPS Take 50 mcg by mouth daily 30 capsule 5    CPAP Machine MISC by Does not apply route Please change CPAP pressure to 10 cm H20. 1 each 0     No current facility-administered medications on file prior to visit.         Vitals from current and previous visits:  Temp 97.8 °F (36.6 °C)   Ht 5' 10\" (1.778 m)   Wt 279 lb (126.6 kg)   BMI 40.03 kg/m²     -Body mass index is 40.03 kg/m². Greater than 40 - Morbid Obesity / Extreme Obesity / Grade III. -Patient lost weight from 296 lbs to 279 lbs over the past 10 months   -Weight goal: lose weight. Nutrition Diagnosis:   Food and nutrition-related knowledge deficit related to Lack of previous MNT/currently undergoing MNT as evidenced by New diagnosis of Type 2 Diabetes. Intervention:  -Impression: Patient has made some changes to diet after his diagnosis of Type 2 Diabetes. Patient lacks previous nutrition knowledge but he was open to the education provided during visit. Patient was interested on learning about different carbohydrates choices. Patient remains ambivalent about making any changes to his physical activity.     -Instructed the patient on: Carbohydrate Counting, Consistent Carbohydrate Intake, Food Label Reading, Meal Planning for Regular, Balanced Meals & Snacks, Plate Method and The Importance of Regular Physical Activity    -Handouts given for: carbohydrate counting, food label reading tips, plate method and sample meal plans/menus. Patient Instructions   1.)  Get familiar with reading the nutrition facts label by looking at serving size and total carbohydrates. - Without a label refer to your carb count guide booklet. 2.)  Measure foods for accuracy in carb counting.    3.)  Healthy carb choices:  whole grains, whole wheat pasta, starchy vegetables, fresh fruit and lowfat/non-fat milk and yogurt. 4.)  Your meal plan is found on the inside cover of your carb counting guide booklet:  1st hernandez or Brkf:  45-60 gms carbohydrates + 1-2 oz protein  2nd meal or Lunch: 30 gms carbohydrates + 2 oz protein + non-starchy veggies  3rd meal or Dinner:  45-60 gms carbohydrates + 3 oz protein + non- starchy veggies    Snack time:  15 - 20 gms carbohydrates + 1 oz protein - having 2-3 snacks/day    5.)  Choose lean protein most of the time and Cut in 1/2 added fats to help with weight loss efforts.     6.) Bring a 1-2 week food log and meter to next dietitian appt. Thanks. Check BS:  1-2x/day at various times of the day  Fasting BS (1st thing in the morning) or before a meal (at least 4 hour since last ate), BS goal:  90 - 130  2 hours after the start of a meal, BS goal:  100 - 150    7.)  Think of an exercise plan. - Maybe start with 15 minute walk 3x/week. -Nutrition prescription: 4553-8703 calories/day, 110-180g carbs/day. ABW - 194 lbs (88kg)    Monitoring/Evaluation:   -Followup visit: 6 weeks with dietitian.   -Receptiveness to education/goals: Agreeable.  -Evaluation of education: Indicates understanding.  -Readiness to change: Contemplation - ambivalent about change to increasing his daily physical activity. Action - ready to set action plan and implement carbohydrate counting and balanced meals and snacks. -Expected compliance: good. Thank you for your referral of this patient. Total time involved in direct patient education: 60 minutes for initial MNT visit. Note was created by Lillian Fajardo, dietetic intern.   Note reviewed by Barbara Galdamez RD, LD,

## 2022-03-21 DIAGNOSIS — K21.9 GASTROESOPHAGEAL REFLUX DISEASE, UNSPECIFIED WHETHER ESOPHAGITIS PRESENT: ICD-10-CM

## 2022-03-21 DIAGNOSIS — E78.00 PURE HYPERCHOLESTEROLEMIA: ICD-10-CM

## 2022-03-21 RX ORDER — ATORVASTATIN CALCIUM 20 MG/1
TABLET, FILM COATED ORAL
Qty: 30 TABLET | Refills: 2 | Status: SHIPPED | OUTPATIENT
Start: 2022-03-21 | End: 2022-06-24

## 2022-03-21 RX ORDER — OMEPRAZOLE 40 MG/1
CAPSULE, DELAYED RELEASE ORAL
Qty: 30 CAPSULE | Refills: 2 | Status: SHIPPED | OUTPATIENT
Start: 2022-03-21

## 2022-03-21 RX ORDER — FENOFIBRATE 145 MG/1
TABLET, COATED ORAL
Qty: 30 TABLET | Refills: 2 | Status: SHIPPED | OUTPATIENT
Start: 2022-03-21 | End: 2022-06-24

## 2022-03-21 RX ORDER — LISINOPRIL 5 MG/1
TABLET ORAL
Qty: 30 TABLET | Refills: 2 | Status: SHIPPED | OUTPATIENT
Start: 2022-03-21 | End: 2022-06-24

## 2022-03-21 RX ORDER — ASPIRIN 81 MG/1
TABLET, COATED ORAL
Qty: 30 TABLET | Refills: 2 | Status: SHIPPED | OUTPATIENT
Start: 2022-03-21 | End: 2022-06-24

## 2022-03-21 RX ORDER — PIOGLITAZONE HCL AND METFORMIN HCL 500; 15 MG/1; MG/1
TABLET ORAL
Qty: 60 TABLET | Refills: 2 | Status: SHIPPED | OUTPATIENT
Start: 2022-03-21 | End: 2022-06-24

## 2022-04-01 ENCOUNTER — NURSE ONLY (OUTPATIENT)
Dept: LAB | Age: 42
End: 2022-04-01

## 2022-04-01 DIAGNOSIS — E78.00 PURE HYPERCHOLESTEROLEMIA: ICD-10-CM

## 2022-04-01 LAB
ALBUMIN SERPL-MCNC: 4.7 G/DL (ref 3.5–5.1)
ALP BLD-CCNC: 71 U/L (ref 38–126)
ALT SERPL-CCNC: 23 U/L (ref 11–66)
ANION GAP SERPL CALCULATED.3IONS-SCNC: 14 MEQ/L (ref 8–16)
AST SERPL-CCNC: 17 U/L (ref 5–40)
AVERAGE GLUCOSE: 108 MG/DL (ref 70–126)
BILIRUB SERPL-MCNC: 0.4 MG/DL (ref 0.3–1.2)
BUN BLDV-MCNC: 17 MG/DL (ref 7–22)
CALCIUM SERPL-MCNC: 10.2 MG/DL (ref 8.5–10.5)
CHLORIDE BLD-SCNC: 102 MEQ/L (ref 98–111)
CHOLESTEROL, TOTAL: 140 MG/DL (ref 100–199)
CO2: 24 MEQ/L (ref 23–33)
CREAT SERPL-MCNC: 1 MG/DL (ref 0.4–1.2)
CREATININE, URINE: 157.4 MG/DL
GFR SERPL CREATININE-BSD FRML MDRD: 81 ML/MIN/1.73M2
GLUCOSE BLD-MCNC: 98 MG/DL (ref 70–108)
HBA1C MFR BLD: 5.6 % (ref 4.4–6.4)
HDLC SERPL-MCNC: 41 MG/DL
LDL CHOLESTEROL CALCULATED: 72 MG/DL
MICROALBUMIN UR-MCNC: 2.98 MG/DL
MICROALBUMIN/CREAT UR-RTO: 19 MG/G (ref 0–30)
POTASSIUM SERPL-SCNC: 4.3 MEQ/L (ref 3.5–5.2)
SODIUM BLD-SCNC: 140 MEQ/L (ref 135–145)
TOTAL PROTEIN: 7.3 G/DL (ref 6.1–8)
TRIGL SERPL-MCNC: 137 MG/DL (ref 0–199)

## 2022-04-05 ENCOUNTER — OFFICE VISIT (OUTPATIENT)
Dept: FAMILY MEDICINE CLINIC | Age: 42
End: 2022-04-05
Payer: COMMERCIAL

## 2022-04-05 VITALS
WEIGHT: 273.5 LBS | HEART RATE: 72 BPM | SYSTOLIC BLOOD PRESSURE: 114 MMHG | RESPIRATION RATE: 18 BRPM | DIASTOLIC BLOOD PRESSURE: 76 MMHG | BODY MASS INDEX: 39.24 KG/M2

## 2022-04-05 DIAGNOSIS — E11.9 CONTROLLED TYPE 2 DIABETES MELLITUS WITHOUT COMPLICATION, WITHOUT LONG-TERM CURRENT USE OF INSULIN (HCC): Primary | ICD-10-CM

## 2022-04-05 DIAGNOSIS — K21.9 GASTROESOPHAGEAL REFLUX DISEASE, UNSPECIFIED WHETHER ESOPHAGITIS PRESENT: ICD-10-CM

## 2022-04-05 DIAGNOSIS — E78.1 HYPERTRIGLYCERIDEMIA: ICD-10-CM

## 2022-04-05 DIAGNOSIS — E66.9 OBESITY (BMI 30-39.9): ICD-10-CM

## 2022-04-05 DIAGNOSIS — E78.00 PURE HYPERCHOLESTEROLEMIA: ICD-10-CM

## 2022-04-05 PROCEDURE — 99214 OFFICE O/P EST MOD 30 MIN: CPT | Performed by: FAMILY MEDICINE

## 2022-04-05 PROCEDURE — G8427 DOCREV CUR MEDS BY ELIG CLIN: HCPCS | Performed by: FAMILY MEDICINE

## 2022-04-05 PROCEDURE — 2022F DILAT RTA XM EVC RTNOPTHY: CPT | Performed by: FAMILY MEDICINE

## 2022-04-05 PROCEDURE — G8417 CALC BMI ABV UP PARAM F/U: HCPCS | Performed by: FAMILY MEDICINE

## 2022-04-05 PROCEDURE — 4004F PT TOBACCO SCREEN RCVD TLK: CPT | Performed by: FAMILY MEDICINE

## 2022-04-05 PROCEDURE — 3044F HG A1C LEVEL LT 7.0%: CPT | Performed by: FAMILY MEDICINE

## 2022-04-05 RX ORDER — MELOXICAM 15 MG/1
TABLET ORAL
Qty: 90 TABLET | Refills: 3 | Status: SHIPPED | OUTPATIENT
Start: 2022-04-05

## 2022-04-05 ASSESSMENT — ENCOUNTER SYMPTOMS
RESPIRATORY NEGATIVE: 1
GASTROINTESTINAL NEGATIVE: 1

## 2022-04-05 NOTE — PROGRESS NOTES
Maria G Brewer (:  1980) is a 36 y.o. male,Established patient, here for evaluation of the following chief complaint(s):  3 Month Follow-Up, Diabetes, Medication Refill, and Discuss Labs        Subjective   SUBJECTIVE/OBJECTIVE:  HPI:   Chief Complaint   Patient presents with    3 Month Follow-Up    Diabetes    Medication Refill    Discuss Labs     3 month eval.      Sugars much improved. Lab Results   Component Value Date    LABA1C 5.6 2022    LABA1C 9.9 (H) 12/15/2021    LABA1C 5.9 2020     Lab Results   Component Value Date    LABMICR 2.98 2022    LDLCALC 72 2022    CREATININE 1.0 2022     BPs controlled. BP Readings from Last 3 Encounters:   22 114/76   22 133/75   22 120/80     Weight is down a few lbs. Wt Readings from Last 3 Encounters:   22 273 lb 8 oz (124.1 kg)   02/15/22 279 lb (126.6 kg)   22 278 lb 6.4 oz (126.3 kg)     Patient Active Problem List   Diagnosis    Tobacco abuse    Dyslipidemia    Nasal obstruction    Deviated nasal septum    Chronic maxillary sinusitis    Hypertrophy of inferior nasal turbinate    Excessive cerumen in both ear canals    Shoulder pain    Right shoulder tendonitis    Obstructive sleep apnea    Morbid obesity with BMI of 40.0-44.9, adult (HCC)    Restless sleeper    Claustrophobia    Daytime somnolence    Atypical chest pain    Anxiety    IFG (impaired fasting glucose)    Metabolic syndrome    Vitamin D deficiency    Nasal septal deviation    Niecy bullosa, bilateral middle turbinate     Past Surgical History:   Procedure Laterality Date    APP[facility]OMY      [facility]    KNEE ARTHROSCOPY  2018    SINUS SURGERY  2014    RT.  MAXILLARY ANTROSTOMY WITH TISSUE REMOVAL SEPTOPLASTY TURBINATE REDUCTION EXCISION AND BIOPSLY LESION MOUTH      Social History     Tobacco Use    Smoking status: Current Every Day Smoker     Packs/day: 1.50     Years: 24.00     Pack years: 36.00     Types: Cigarettes     Start date: 12/12/1995    Smokeless tobacco: Never Used   Vaping Use    Vaping Use: Never used   Substance Use Topics    Alcohol use: No     Alcohol/week: 0.0 standard drinks    Drug use: No     Prior to Admission medications    Medication Sig Start Date End Date Taking? Authorizing Provider   meloxicam (MOBIC) 15 MG tablet take 1 tablet by mouth once daily 4/5/22  Yes Johan Jaffe, DO   omeprazole (PRILOSEC) 40 MG delayed release capsule take 1 capsule by mouth every morning before breakfast 3/21/22  Yes Johan Jaffe, DO   atorvastatin (LIPITOR) 20 MG tablet take 1 tablet by mouth at bedtime 3/21/22  Yes Johan Jaffe, DO   lisinopril (PRINIVIL;ZESTRIL) 5 MG tablet take 1 tablet by mouth once daily 3/21/22  Yes Johan Jaffe, DO   ASPIRIN LOW DOSE 81 MG EC tablet take 1 tablet by mouth once daily 3/21/22  Yes Johan Jaffe, DO   pioglitazone-metFORMIN (ACTOPLUS MET)  MG per tablet take 1 tablet by mouth twice a day with food 3/21/22  Yes Johan Jaffe, DO   fenofibrate (TRICOR) 145 MG tablet take 1 tablet by mouth once daily 3/21/22  Yes Johan Jaffe, DO   blood glucose test strips (ONETOUCH VERIO) strip Check sugars BID. Dx: E11.9 1/4/22  Yes Johan Jaffe, DO   Blood Glucose Monitoring Suppl (Osper IQ SYSTEM) w/Device KIT Check sugars daily. Dx: E11.9 12/17/21  Yes Johan Jaffe DO   ONE TOUCH ULTRASOFT LANCETS MISC Check sugars daily. Dx: E11.9 12/17/21  Yes Johan Jaffe, DO   Cholecalciferol 50 MCG (2000 UT) CAPS Take 50 mcg by mouth daily 4/6/21  Yes Jessica Mcneal PA-C   CPAP Machine MISC by Does not apply route Please change CPAP pressure to 10 cm H20. 10/6/20  Yes Jessica Mcneal PA-C         Review of Systems   Constitutional: Negative. HENT: Negative. Respiratory: Negative. Cardiovascular: Negative. Gastrointestinal: Negative. Musculoskeletal: Negative.     All other systems reviewed and are negative. Objective   Physical Exam  Vitals and nursing note reviewed. Constitutional:       General: He is not in acute distress. Appearance: Normal appearance. He is well-developed. HENT:      Head: Normocephalic and atraumatic. Right Ear: Tympanic membrane normal.      Left Ear: Tympanic membrane normal.   Eyes:      Conjunctiva/sclera: Conjunctivae normal.   Cardiovascular:      Rate and Rhythm: Normal rate and regular rhythm. Heart sounds: Normal heart sounds. No murmur heard. Pulmonary:      Effort: Pulmonary effort is normal.      Breath sounds: Normal breath sounds. No wheezing, rhonchi or rales. Abdominal:      General: There is no distension. Musculoskeletal:      Cervical back: Neck supple. Skin:     General: Skin is warm and dry. Findings: No rash (on exposed surfaces). Neurological:      General: No focal deficit present. Mental Status: He is alert. Psychiatric:         Attention and Perception: Attention normal.         Mood and Affect: Mood normal.         Speech: Speech normal.         Behavior: Behavior normal. Behavior is cooperative. Thought Content: Thought content normal.         Judgment: Judgment normal.               ASSESSMENT/PLAN:  1. Controlled type 2 diabetes mellitus without complication, without long-term current use of insulin ([facility] [address]  2. Gastroesophageal reflux disease, unspecified whether esophagitis present  3. Pure hypercholesterolemia  4. Hypertriglyceridemia  5. Obesity (BMI 30-39.9)    -  Chronic medical problems stable  -  Labs reviewed, look much better  -  Continue current medications  -  Recheck labs above in 6 mos      Return in about 6 months (around 10/5/2022) for DM2. Consider DC fenofibrate and pioglitazone if labs still look good. An electronic signature was used to authenticate this note.     --Dillon Patton DO

## 2022-04-05 NOTE — PATIENT INSTRUCTIONS
You may receive a survey regarding the care you received during your visit. Your input is valuable to us. We encourage you to complete and return your survey. We hope you will choose us in the future for your healthcare needs. Tobacco Cessation Programs     Telephonic behavior modification  Maria C King ([phone])   Counseling service for those who are ready to quit using tobacco.     Available for uninsured [place] residents, [place] recipients, pregnant women, or patients whose health plans or employers are members of the [address] [place] [address] behavior modification   http://[place]. Quitlogix. org   Online support program to help patients through each step of the quitting process. Available 24 hours a day 7 days a week. Provides up to date researched based tool, step-by-step guides, and motivational messages. Online behavior modification   www.lungusa.org/stop-smoking/how-to-quit   HelpLine: [facility] (124-6960)   Email questions to:  Mari@BoxC. org    Website offers resources to help tobacco users figure out their reasons for quitting and then take the big step of quitting for good. Hypnosis   Location: [facility] [place], [place]   Contact: Rupal Phan, PhD at [phone]   Hypnosis for tobacco cessation   Cost $225 for the initial session and $175 for each session afterwards. Most patients require 6-8 sessions. There is the option to submit through the patients insurance. Hypnosis and behavior modification   Location: [place] [address],  [facility] [address] [place], [place]   Contact: Raquel Sky, PhD at [phone]  [facility] and hypnosis for nicotine addition   Cost: For uninsured patients:  Please call above phone number  Cost for insured patients depends on patients insurance plan.     Behavior modification   Location: [facility], [address] [facility] [address] [place], [address]: [place] [address] include four one-on-one appointments between the patient and a respiratory therapist.  The four appointments span over three weeks. The respiratory therapist schedules one of the appointments to occur 48 hours after the patients quit date.  Cost $100 total for the four sessions.   Tobacco cessation products are not included in the cost and are not provided by [facility].     Elfego Tovar

## 2022-04-06 ENCOUNTER — OFFICE VISIT (OUTPATIENT)
Dept: PULMONOLOGY | Age: 42
End: 2022-04-06
Payer: COMMERCIAL

## 2022-04-06 VITALS
DIASTOLIC BLOOD PRESSURE: 72 MMHG | BODY MASS INDEX: 39.4 KG/M2 | HEIGHT: 70 IN | WEIGHT: 275.2 LBS | HEART RATE: 69 BPM | OXYGEN SATURATION: 99 % | SYSTOLIC BLOOD PRESSURE: 130 MMHG | TEMPERATURE: 98 F

## 2022-04-06 DIAGNOSIS — E55.9 VITAMIN D DEFICIENCY: ICD-10-CM

## 2022-04-06 DIAGNOSIS — G47.10 HYPERSOMNIA: ICD-10-CM

## 2022-04-06 DIAGNOSIS — G47.33 OBSTRUCTIVE SLEEP APNEA: Primary | ICD-10-CM

## 2022-04-06 DIAGNOSIS — E66.9 OBESITY (BMI 30-39.9): ICD-10-CM

## 2022-04-06 PROCEDURE — 4004F PT TOBACCO SCREEN RCVD TLK: CPT | Performed by: PHYSICIAN ASSISTANT

## 2022-04-06 PROCEDURE — G8427 DOCREV CUR MEDS BY ELIG CLIN: HCPCS | Performed by: PHYSICIAN ASSISTANT

## 2022-04-06 PROCEDURE — G8417 CALC BMI ABV UP PARAM F/U: HCPCS | Performed by: PHYSICIAN ASSISTANT

## 2022-04-06 PROCEDURE — 99213 OFFICE O/P EST LOW 20 MIN: CPT | Performed by: PHYSICIAN ASSISTANT

## 2022-04-06 ASSESSMENT — ENCOUNTER SYMPTOMS
EYES NEGATIVE: 1
COUGH: 0
STRIDOR: 0
CHEST TIGHTNESS: 0
BACK PAIN: 0
DIARRHEA: 0
NAUSEA: 0
ALLERGIC/IMMUNOLOGIC NEGATIVE: 1
WHEEZING: 0
SHORTNESS OF BREATH: 0

## 2022-04-06 NOTE — PATIENT INSTRUCTIONS
Patient Education        Stopping Smoking: Care Instructions  Your Care Instructions     Cigarette smokers crave the nicotine in cigarettes. Giving it up is much harder than simply changing a habit. Your body has to stop craving the nicotine. It is hard to quit, but you can do it. There are many tools that people use to quitsmoking. You may find that combining tools works best for you. There are several steps to quitting. First you get ready to quit. Then you get support to help you. After that, you learn new skills and behaviors to become anonsmoker. For many people, a necessary step is getting and using medicine. Your doctor will help you set up the plan that best meets your needs. You may want to attend a smoking cessation program to help you quit smoking. When you choose a program, look for one that has proven success. Ask your doctor for ideas. You will greatly increase your chances of success if you take medicineas well as get counseling or join a cessation program.  Some of the changes you feel when you first quit tobacco are uncomfortable. Your body will miss the nicotine at first, and you may feel short-tempered and grumpy. You may have trouble sleeping or concentrating. Medicine can help you deal with these symptoms. You may struggle with changing your smoking habits and rituals. The last step is the tricky one: Be prepared for the smoking urge to continue for a time. This is a lot to deal with, but keep at it. You willfeel better. Follow-up care is a key part of your treatment and safety. Be sure to make and go to all appointments, and call your doctor if you are having problems. It's also a good idea to know your test results and keep alist of the medicines you take. How can you care for yourself at home?  Ask your family, friends, and coworkers for support. You have a better chance of quitting if you have help and support.    Join a support group, such as Nicotine Anonymous, for people who are trying to quit smoking.  Consider signing up for a smoking cessation program, such as the American Lung Association's Freedom from Smoking program.   Get text messaging support. Go to the website at www.smokefree. gov to sign up for the  program.   Set a quit date. Pick your date carefully so that it is not right in the middle of a big deadline or stressful time. Once you quit, do not even take a puff. Get rid of all ashtrays and lighters after your last cigarette. Clean your house and your clothes so that they do not smell of smoke.  Learn how to be a nonsmoker. Think about ways you can avoid those things that make you reach for a cigarette. ? Avoid situations that put you at greatest risk for smoking. For some people, it is hard to have a drink with friends without smoking. For others, they might skip a coffee break with coworkers who smoke. ? Change your daily routine. Take a different route to work or eat a meal in a different place.  Cut down on stress. Calm yourself or release tension by doing an activity you enjoy, such as reading a book, taking a hot bath, or gardening.  Talk to your doctor or pharmacist about nicotine replacement therapy, which replaces the nicotine in your body. You still get nicotine but you do not use tobacco. Nicotine replacement products help you slowly reduce the amount of nicotine you need. These products come in several forms, many of them available over-the-counter:  ? Nicotine patches  ? Nicotine gum and lozenges  ? Nicotine inhaler   Ask your doctor about bupropion (Wellbutrin) or varenicline (Chantix), which are prescription medicines. They do not contain nicotine. They help you by reducing withdrawal symptoms, such as stress and anxiety.  Some people find hypnosis, acupuncture, and massage helpful for ending the smoking habit.  Eat a healthy diet and get regular exercise.  Having healthy habits will help your body move past its craving for nicotine.  Be prepared to keep trying. Most people are not successful the first few times they try to quit. Do not get mad at yourself if you smoke again. Make a list of things you learned and think about when you want to try again, such as next week, next month, or next year. Where can you learn more? Go to https://TodoCast TVpepiceweb.m0um0u. org and sign in to your [employer] account. Enter L112 in the Clupedia box to learn more about \"Stopping Smoking: Care Instructions. \"     If you do not have an account, please click on the \"Sign Up Now\" link. Current as of: October 28, 2021               Content Version: 13.2  © 2006-2022 Healthwise, Incorporated. Care instructions adapted under license by [facility]. If you have questions about a medical condition or this instruction, always ask your healthcare professional. [address] any warranty or liability for your use of this information.

## 2022-04-06 NOTE — PROGRESS NOTES
[facility]      Jonathan Fernandez         [id number]  4/6/2022   Chief Complaint   Patient presents with    1 Year Follow Up     SUSANA with download, Vit D        Pt of Dr. Kerri Conti    PAP Download:   Celestina Larios or initial AHI: 11.5     Date of initial study: 4/7/2015      Compliant  100%     Noncompliant 0 %     PAP Type Airsense 10 autoset Level  10   Avg Hrs/Day 8 hr 11 min  AHI: 0.2   Recorded compliance dates , 3/6/2022  to 4/4/2022   Machine/Mfg:   [x] ResMed    [] Respironics/Dreamstation   Interface:   [x] Nasal    [] Nasal pillows   [] FFM      Provider:      [x] [facility]     [][employer]     [] [employer]    [] Raynald Quant    [] [facility]               [] [facility] [facility]      [] [employer]    [] [address]      [] Other    Neck Size: 16  Mallampati Mallampati 4  ESS:  8  SAQLI: 88    Here is a scan of the most recent download:          Presentation:   Christiano Carvajal presents for sleep medicine follow up for obstructive sleep apnea  Since the last visit, Christiano Carvajal is doing well with PAP. He is sleeping well and feels rested. Still taking Vit D and he does get benefit. Equipment issues: The pressure is  acceptable, the mask is acceptable     Sleep issues:  Do you feel better? Yes  More rested? Yes   Better concentration? yes    Progress History:   Since last visit any new medical issues? Yes DM  New ER or hospital visits? No  Any new or changes in medicines? Yes DM meds  Any new sleep medicines? No    Review of Systems -   Review of Systems   Constitutional: Negative for activity change, appetite change, chills and fever. HENT: Negative for congestion and postnasal drip. Eyes: Negative. Respiratory: Negative for cough, chest tightness, shortness of breath, wheezing and stridor. Cardiovascular: Negative for chest pain and leg swelling. Gastrointestinal: Negative for diarrhea and nausea. Endocrine: Negative. Genitourinary: Negative. Musculoskeletal: Negative.   Negative for arthralgias and back pain. Skin: Negative. Allergic/Immunologic: Negative. Neurological: Negative. Negative for dizziness and light-headedness. Psychiatric/Behavioral: Negative. All other systems reviewed and are negative. Physical Exam:    BMI:  Body mass index is 39.49 kg/m². Wt Readings from Last 3 Encounters:   04/06/22 275 lb 3.2 oz (124.8 kg)   04/05/22 273 lb 8 oz (124.1 kg)   02/15/22 279 lb (126.6 kg)     Weight lost 20 lbs over 1 year  Vitals: /72 (Site: Left Upper Arm, Position: Sitting, Cuff Size: Large Adult)   Pulse 69   Temp 98 °F (36.7 °C) (Temporal)   Ht 5' 10\" (1.778 m)   Wt 275 lb 3.2 oz (124.8 kg)   SpO2 99%   BMI 39.49 kg/m²       Physical Exam  Constitutional:       Appearance: Normal appearance. He is normal weight. HENT:      Head: Normocephalic and atraumatic. Right Ear: External ear normal.      Left Ear: External ear normal.      Nose: Nose normal.   Eyes:      Extraocular Movements: Extraocular movements intact. Conjunctiva/sclera: Conjunctivae normal.      Pupils: Pupils are equal, round, and reactive to light. Pulmonary:      Effort: Pulmonary effort is normal.   Musculoskeletal:      Cervical back: Normal range of motion and neck supple. Neurological:      General: No focal deficit present. Mental Status: He is alert and oriented to person, place, and time. Psychiatric:         Attention and Perception: Attention and perception normal.         Mood and Affect: Mood and affect normal.         Speech: Speech normal.         Behavior: Behavior normal. Behavior is cooperative. Thought Content: Thought content normal.         Cognition and Memory: Cognition normal.         Judgment: Judgment normal.           ASSESSMENT/DIAGNOSIS     Diagnosis Orders   1. Obstructive sleep apnea     2. Obesity (BMI 30-39.9)     3. Hypersomnia     4. Vitamin D deficiency            Plan   Do you need any equipment today?  Yes update supplies  - Download reviewed and discussed with patient  - He  was advised to continue current positive airway pressure therapy with above described pressure. - He  advised to keep good compliance with current recommended pressure to get optimal results and clinical improvement  - Recommend 7-9 hours of sleep with PAP  - He was advised to call [employer] regarding supplies if needed.   -He call my office for earlier appointment if needed for worsening of sleep symptoms.   - He was instructed on weight loss  - Leticia Keys was educated about my impression and plan. Patient verbalizesunderstanding.   We will see Coleman Vizcaino back in: 1 year with download    Information added by my medical assistant/LPN was reviewed today         Sina Hernandez PA-C, MPAS  4/6/2022

## 2022-04-19 ENCOUNTER — OFFICE VISIT (OUTPATIENT)
Dept: INTERNAL MEDICINE CLINIC | Age: 42
End: 2022-04-19
Payer: COMMERCIAL

## 2022-04-19 VITALS
HEIGHT: 70 IN | WEIGHT: 280 LBS | DIASTOLIC BLOOD PRESSURE: 76 MMHG | BODY MASS INDEX: 40.09 KG/M2 | TEMPERATURE: 97.3 F | SYSTOLIC BLOOD PRESSURE: 134 MMHG | HEART RATE: 73 BPM

## 2022-04-19 DIAGNOSIS — E11.9 TYPE 2 DIABETES MELLITUS WITHOUT COMPLICATION, WITHOUT LONG-TERM CURRENT USE OF INSULIN (HCC): ICD-10-CM

## 2022-04-19 PROCEDURE — G0108 DIAB MANAGE TRN  PER INDIV: HCPCS | Performed by: INTERNAL MEDICINE

## 2022-04-19 NOTE — PROGRESS NOTES
[facility]  [address] [place], [address]  [phone] (phone)  [phone] (fax)    Patient ID: Trung Benítez [id number]  Referring Provider: Dr. Hermina Paget     Patient's name and  were verified. Subjective:    He presents for His follow-up diabetic visit. He has type 2 diabetes mellitus. Home regimen includes: Biguanide and TZD He is noncompliant some of the time. Assessment:     Lab Results   Component Value Date    LABA1C 5.6 2022    BUN 17 2022    CREATININE 1.0 2022     There were no vitals filed for this visit. Wt Readings from Last 3 Encounters:   22 275 lb 3.2 oz (124.8 kg)   22 273 lb 8 oz (124.1 kg)   02/15/22 279 lb (126.6 kg)     Ht Readings from Last 3 Encounters:   22 5' 10\" (1.778 m)   02/15/22 5' 10\" (1.778 m)   22 5' 10\" (1.778 m)       Current monitoring regimen: home blood tests - 1 times daily  Home blood sugar trends: FBS's 104-165. 2hrpp D 158  Any episodes of hypoglycemia? no  Depression screening completed 2022  Previous visit with dietician: yes - 2/15/2022  Current diet: B arteaga/eggs/ biscuits/ gravy (once per 2 wks)                                 cherrios                       L snack -- turkey sausage sticks/ chicken/ grapes                       D venison burger/ wild rice/ bread/ butter                       Snack --2 turk saus sticks                      Current exercise: ADL's                   Started walking 1/2 mile per day. Golf 3 times per week  Eye exam current (within one year): yes 3/22/22 Dr. Meka Trotter  Any history of foot problems? no  Last foot exam: 2022 Pedal pulses:   peripheral pulses symmetrical   Results of monofilament test: 10/10              Skin noted to be warm, pale and hair is reduced. Immunizations up to date: no; refuses  Taking ASA:  Yes   Appropriate for use of MyChart Glucose Grid:  Yes    Focus:     Diabetes education. A1C from 22 5.6%. Danay Kelley is doing well at this time. His A1C and glucose levels have come down to within goal with dietary efforts and his medication therapy. He did not start exercise efforts as he wanted to see what diet and medication would do first, then he would add exercise. He has lost about 10 pounds since diagnosis. His meal plan is not as stringent as when first diagnosed--we reviewed his dietary goals. He is starting walking efforts this week. He discussed the desire to get off of the medication for blood sugars. We discussed their long term benefits; of keeping these medications in place, even with good blood sugar control--preserving beta cell function. Much encouragement given. Follow up 3 months. DSME PLAN:   Discussed general issues about diabetes pathophysiology and management. Counseling at today's visit: BG goals; carbs; exercise; medication actions/ benefits; wt loss. Get your exercise plan in place                 5-6 days per week with about 20-30 minutes  Keep checking your blood sugar once daily            Either waking up (3 days per week)             Or 2hours after finishing a meal               Goal waking                 2 hours after eating --under 150/160  Stick with your meal plan and carb limits most days  Keep working on your weight loss goal. Under 260pounds  Meter download, medications, PMH and nursing assessment reviewed. Tyvalarie Rodolfo states He is willing to participate in this plan of care and verbalized understanding of all instructions provided. Teach back used to verify comprehension. Total time involved in direct patient education: 60 minutes.

## 2022-04-19 NOTE — PATIENT INSTRUCTIONS
Get your exercise plan in place                 5-6 days per week with about 20-30 minutes  Keep checking your blood sugar once daily            Either waking up (3 days per week)             Or 2hours after finishing a meal               Goal waking                 2 hours after eating --under 150/160  Stick with your meal plan and carb limits most days  Keep working on your weight loss goal. Under 260pounds

## 2022-04-20 NOTE — TELEPHONE ENCOUNTER
Patient was seen 4/6/2022. He is calling in stating he thought his vitamin D had been refilled at that appt, but [address] has not received anything. Please verify and send if ok?

## 2022-04-27 ENCOUNTER — NURSE ONLY (OUTPATIENT)
Dept: INTERNAL MEDICINE CLINIC | Age: 42
End: 2022-04-27
Payer: COMMERCIAL

## 2022-04-27 DIAGNOSIS — E11.9 TYPE 2 DIABETES MELLITUS WITHOUT COMPLICATION, WITHOUT LONG-TERM CURRENT USE OF INSULIN (HCC): ICD-10-CM

## 2022-04-27 PROCEDURE — G0109 DIAB MANAGE TRN IND/GROUP: HCPCS | Performed by: INTERNAL MEDICINE

## 2022-04-27 NOTE — PROGRESS NOTES
Patient presented for the [facility]  education class. The content was presented via PowerPoint, lecture and case-based format covering the following concepts: 60mins education. · What is Diabetes? · Define glycosolation  · Interpretation of the A1C and goal.  · Pancreatic function  · Target organs and macro and microvascular complications  · Goals for SGM BP goals  · Meal clearance   · S&S hyper/hypoglycemia and tx   · Insulin physiology-basal/bolus  · Navigating sick days stress  · Relationship between diet, exercise, meds and stress  ·  Utilizing shilpi care system at appropriate time  · Assuming responsibility in self management  · Troubleshooting patterns    Post test score 15 out of 16.

## 2022-06-23 DIAGNOSIS — E78.00 PURE HYPERCHOLESTEROLEMIA: ICD-10-CM

## 2022-06-23 NOTE — TELEPHONE ENCOUNTER
Pt called office requesting a refill of Atorvastatin, Lisinopril, ASA, Actoplus, Fenofibrate and test strips to [facility]. If no call back he will check with the pharmacy after 5pm. Refill if appropriate.

## 2022-06-24 RX ORDER — PIOGLITAZONE HCL AND METFORMIN HCL 500; 15 MG/1; MG/1
TABLET ORAL
Qty: 60 TABLET | Refills: 2 | OUTPATIENT
Start: 2022-06-24

## 2022-06-24 RX ORDER — LANCETS 33 GAUGE
EACH MISCELLANEOUS
Qty: 100 EACH | Refills: 3 | Status: SHIPPED | OUTPATIENT
Start: 2022-06-24

## 2022-06-24 RX ORDER — LISINOPRIL 5 MG/1
TABLET ORAL
Qty: 30 TABLET | Refills: 2 | Status: SHIPPED | OUTPATIENT
Start: 2022-06-24 | End: 2022-09-26

## 2022-06-24 RX ORDER — ATORVASTATIN CALCIUM 20 MG/1
TABLET, FILM COATED ORAL
Qty: 30 TABLET | Refills: 2 | Status: SHIPPED | OUTPATIENT
Start: 2022-06-24 | End: 2022-09-26

## 2022-06-24 RX ORDER — ASPIRIN 81 MG/1
TABLET ORAL
Qty: 30 TABLET | Refills: 2 | OUTPATIENT
Start: 2022-06-24

## 2022-06-24 RX ORDER — LISINOPRIL 5 MG/1
TABLET ORAL
Qty: 30 TABLET | Refills: 2 | OUTPATIENT
Start: 2022-06-24

## 2022-06-24 RX ORDER — FENOFIBRATE 145 MG/1
TABLET, COATED ORAL
Qty: 30 TABLET | Refills: 2 | Status: SHIPPED | OUTPATIENT
Start: 2022-06-24 | End: 2022-09-26

## 2022-06-24 RX ORDER — FENOFIBRATE 145 MG/1
TABLET, COATED ORAL
Qty: 30 TABLET | Refills: 2 | OUTPATIENT
Start: 2022-06-24

## 2022-06-24 RX ORDER — ASPIRIN 81 MG/1
TABLET, COATED ORAL
Qty: 30 TABLET | Refills: 2 | Status: SHIPPED | OUTPATIENT
Start: 2022-06-24 | End: 2022-09-26

## 2022-06-24 RX ORDER — BLOOD SUGAR DIAGNOSTIC
STRIP MISCELLANEOUS
Qty: 200 EACH | Refills: 3 | OUTPATIENT
Start: 2022-06-24

## 2022-06-24 RX ORDER — ATORVASTATIN CALCIUM 20 MG/1
TABLET, FILM COATED ORAL
Qty: 30 TABLET | Refills: 2 | OUTPATIENT
Start: 2022-06-24

## 2022-06-24 RX ORDER — PIOGLITAZONE HCL AND METFORMIN HCL 500; 15 MG/1; MG/1
TABLET ORAL
Qty: 60 TABLET | Refills: 2 | Status: SHIPPED | OUTPATIENT
Start: 2022-06-24 | End: 2022-09-26

## 2022-07-26 ENCOUNTER — OFFICE VISIT (OUTPATIENT)
Dept: INTERNAL MEDICINE CLINIC | Age: 42
End: 2022-07-26
Payer: COMMERCIAL

## 2022-07-26 VITALS
HEART RATE: 76 BPM | WEIGHT: 276.2 LBS | TEMPERATURE: 97.9 F | BODY MASS INDEX: 39.54 KG/M2 | SYSTOLIC BLOOD PRESSURE: 132 MMHG | HEIGHT: 70 IN | DIASTOLIC BLOOD PRESSURE: 78 MMHG

## 2022-07-26 DIAGNOSIS — E11.9 TYPE 2 DIABETES MELLITUS WITHOUT COMPLICATION, WITHOUT LONG-TERM CURRENT USE OF INSULIN (HCC): Primary | ICD-10-CM

## 2022-07-26 LAB — HBA1C MFR BLD: 4.9 % (ref 4.3–5.7)

## 2022-07-26 PROCEDURE — G0108 DIAB MANAGE TRN  PER INDIV: HCPCS | Performed by: INTERNAL MEDICINE

## 2022-07-26 PROCEDURE — 83036 HEMOGLOBIN GLYCOSYLATED A1C: CPT | Performed by: INTERNAL MEDICINE

## 2022-07-26 NOTE — PROGRESS NOTES
[facility]  [facility] [address] [facility], [address]  [phone] (phone)  [phone] (fax)    Patient ID: Hank Cueto [id number]  Referring Provider: Dr. Jamie Little     Patient's name and  were verified. Subjective:    He presents for His follow-up diabetic visit. He has type 2 diabetes mellitus. Home regimen includes: Biguanide and TZD He is noncompliant some of the time. Assessment:     Lab Results   Component Value Date/Time    LABA1C 4.9 2022 08:36 AM    LABA1C 5.6 2022 12:42 PM    BUN 17 2022 12:42 PM    CREATININE 1.0 2022 12:42 PM     Vitals:    22 1236   BP: 132/78   Site: Left Upper Arm   Position: Sitting   Pulse: 76   Temp: 97.9 °F (36.6 °C)   Weight: 276 lb 3.2 oz (125.3 kg)   Height: 5' 10\" (1.778 m)     Wt Readings from Last 3 Encounters:   22 276 lb 3.2 oz (125.3 kg)   22 280 lb (127 kg)   22 275 lb 3.2 oz (124.8 kg)     Ht Readings from Last 3 Encounters:   22 5' 10\" (1.778 m)   22 5' 10\" (1.778 m)   22 5' 10\" (1.778 m)       Diabetes Pharmacotherapy:  ActoPlus 15/500mg 1 tab BID    Glucose Trends:   Current monitoring regimen: Fingerstick blood tests - 1 times daily  Home blood sugar trends:    -Fasting AM: , 132   -Before lunch: 87   -2hrs dinner: 102   -Bedtime:  Any episodes of hypoglycemia? No; lowest 98    -Treats with n/a    Lifestyle Factors:   Previous visit with dietician: yes - 2/15/22  Current diet: B: Intermitt fasting several days per week                             Cherrios or sc eggs/ cheese/ arteaga/fiber wrap            L: hamburger sandwich                       D: Gwinda Gene 1/2 rice/ noodles                       Snacks: rare -couple spoons chicken salad                       Beverages: water; SF cranberry juice; 1 0 soda per week  Current exercise: no exercise routine.  Golf 2 days per week w/ cart    Health Maintenance:  Eye exam current (within one year): yes Date: 3/2022,  Alissa Thomas  Any history of foot problems? no  Foot exam up to date: yes Date: 4/19/22  Immunizations up to date:    Pneumonia: no   COVID-19: no  The 10-year ASCVD risk score (Dayanara Swenson et al., 2013) is: 6.4%    Values used to calculate the score:      Age: 36 years      Sex: Male      Is Non- : No      Diabetic: Yes      Tobacco smoker: Yes      Systolic Blood Pressure: 746 mmHg      Is BP treated: No      HDL Cholesterol: 41 mg/dL      Total Cholesterol: 140 mg/dL   Last panel - LDL:   Lab Results   Component Value Date    LDLCALC 72 04/01/2022    LDLDIRECT 100.74 12/15/2021     Taking ASA:  Yes   Taking statin: Yes - atorvastatin  Last microalbumin/creatinine ratio: No results found for: MCACR, MALBCR, MALBCRRATEXT   Taking ACE/ARB: Yes- lisinopril  Depression screening completed. 1/4/2022    Focus:   Diabetes Education. Last A1C: 4.9% 7/26/22. Glucose levels are in great control. But weight remain stable with BMI of 39. This alone increases health risk. Much reassurance that glucose levels are well within goal, but stressed the need for meal planning/ exercise and weight loss. He is doing great with his present therapy, but we did discuss GLP1 therapy (Trulicity) for weight loss potential and glucose control. He reports always being hungry. Trulicity could potentially help with hunger and could be used in lieu of ActoPlus. Today we discussed need for exercise routine and to continue/ enhance dietary efforts. Weight loss is govea to overall health and delaying the progression of Diabetes. He declines seeing the dietician, unless he cannot achieve weight loss on his own. Much encouragement given.       Curriculum Area Focus: Physical activity goals, Pattern management, Lifestyle & healthy coping, and Diabetes distress & support  DSME PLAN:     Set a goal to exercise 4-5 days per week     --walking     --stretch bands/ weights sitting in chair in front of TV     --gym: machines with upper body              -try the Eliptical or bike or treadmill. Short duration-see how your             Knees do  Watch your calorie and carbohydrate intake             You are doing good with carbohydrates most days           Watch the higher calorie food choices  Goal for weight loss--under 270 pounds by your next visit/ 4 months       If you need help with weight loss efforts/ meal plan-we will                 Schedule appointment with the dietician to help  Keep up the good work! Prevention is the key! Goals Addressed                   This Visit's Progress     3 meals per day; 30-60 grams carb per meal   Not on track     check blood sugars once daily --fasting or 2 hrs after eating   On track     Exercise 20-30 minutes 4-5 days per week   Not on track           Meter download, medications, PMH and nursing assessment reviewed. Zee Cha states He is willing to participate in this plan of care and verbalized understanding of all instructions provided. Teach back used to verify comprehension. Follow-up: 4 months    Total time involved in direct patient education: 60 minutes.

## 2022-07-26 NOTE — PATIENT INSTRUCTIONS
Set a goal to exercise 4-5 days per week     --walking     --stretch bands/ weights sitting in chair in front of TV     --gym: machines with upper body              -try the Eliptical or bike or treadmill. Short duration-see how your             Knees do  Watch your calorie and carbohydrate intake             You are doing good with carbohydrates most days           Watch the higher calorie food choices  Goal for weight loss--under 270 pounds by your next visit/ 4 months       If you need help with weight loss efforts/ meal plan-we will                 Schedule appointment with the dietician to help  Keep up the good work! Prevention is the govea!

## 2022-09-26 DIAGNOSIS — E78.00 PURE HYPERCHOLESTEROLEMIA: ICD-10-CM

## 2022-09-26 RX ORDER — ASPIRIN 81 MG/1
TABLET, COATED ORAL
Qty: 30 TABLET | Refills: 2 | Status: SHIPPED | OUTPATIENT
Start: 2022-09-26

## 2022-09-26 RX ORDER — LISINOPRIL 5 MG/1
TABLET ORAL
Qty: 30 TABLET | Refills: 2 | Status: SHIPPED | OUTPATIENT
Start: 2022-09-26

## 2022-09-26 RX ORDER — ATORVASTATIN CALCIUM 20 MG/1
TABLET, FILM COATED ORAL
Qty: 30 TABLET | Refills: 2 | Status: SHIPPED | OUTPATIENT
Start: 2022-09-26

## 2022-09-26 RX ORDER — PIOGLITAZONE HCL AND METFORMIN HCL 500; 15 MG/1; MG/1
TABLET ORAL
Qty: 60 TABLET | Refills: 2 | Status: SHIPPED | OUTPATIENT
Start: 2022-09-26 | End: 2022-10-04

## 2022-09-26 RX ORDER — FENOFIBRATE 145 MG/1
TABLET, COATED ORAL
Qty: 30 TABLET | Refills: 2 | Status: SHIPPED | OUTPATIENT
Start: 2022-09-26

## 2022-10-03 ENCOUNTER — NURSE ONLY (OUTPATIENT)
Dept: LAB | Age: 42
End: 2022-10-03

## 2022-10-03 DIAGNOSIS — E11.9 CONTROLLED TYPE 2 DIABETES MELLITUS WITHOUT COMPLICATION, WITHOUT LONG-TERM CURRENT USE OF INSULIN (HCC): ICD-10-CM

## 2022-10-03 DIAGNOSIS — E78.00 PURE HYPERCHOLESTEROLEMIA: ICD-10-CM

## 2022-10-03 LAB
AVERAGE GLUCOSE: 93 MG/DL (ref 70–126)
CHOLESTEROL, TOTAL: 133 MG/DL (ref 100–199)
HBA1C MFR BLD: 5.1 % (ref 4.4–6.4)
HDLC SERPL-MCNC: 40 MG/DL
LDL CHOLESTEROL CALCULATED: 73 MG/DL
TRIGL SERPL-MCNC: 100 MG/DL (ref 0–199)

## 2022-10-04 ENCOUNTER — OFFICE VISIT (OUTPATIENT)
Dept: FAMILY MEDICINE CLINIC | Age: 42
End: 2022-10-04
Payer: COMMERCIAL

## 2022-10-04 VITALS
RESPIRATION RATE: 16 BRPM | SYSTOLIC BLOOD PRESSURE: 136 MMHG | BODY MASS INDEX: 40.96 KG/M2 | DIASTOLIC BLOOD PRESSURE: 74 MMHG | HEART RATE: 76 BPM | WEIGHT: 285.5 LBS

## 2022-10-04 DIAGNOSIS — E11.9 CONTROLLED TYPE 2 DIABETES MELLITUS WITHOUT COMPLICATION, WITHOUT LONG-TERM CURRENT USE OF INSULIN (HCC): Primary | ICD-10-CM

## 2022-10-04 DIAGNOSIS — E78.00 PURE HYPERCHOLESTEROLEMIA: ICD-10-CM

## 2022-10-04 DIAGNOSIS — E66.9 OBESITY (BMI 30-39.9): ICD-10-CM

## 2022-10-04 DIAGNOSIS — E78.1 HYPERTRIGLYCERIDEMIA: ICD-10-CM

## 2022-10-04 DIAGNOSIS — K21.9 GASTROESOPHAGEAL REFLUX DISEASE, UNSPECIFIED WHETHER ESOPHAGITIS PRESENT: ICD-10-CM

## 2022-10-04 PROCEDURE — 99214 OFFICE O/P EST MOD 30 MIN: CPT | Performed by: FAMILY MEDICINE

## 2022-10-04 PROCEDURE — G8427 DOCREV CUR MEDS BY ELIG CLIN: HCPCS | Performed by: FAMILY MEDICINE

## 2022-10-04 PROCEDURE — G8417 CALC BMI ABV UP PARAM F/U: HCPCS | Performed by: FAMILY MEDICINE

## 2022-10-04 PROCEDURE — 2022F DILAT RTA XM EVC RTNOPTHY: CPT | Performed by: FAMILY MEDICINE

## 2022-10-04 PROCEDURE — 4004F PT TOBACCO SCREEN RCVD TLK: CPT | Performed by: FAMILY MEDICINE

## 2022-10-04 PROCEDURE — 3044F HG A1C LEVEL LT 7.0%: CPT | Performed by: FAMILY MEDICINE

## 2022-10-04 PROCEDURE — G8484 FLU IMMUNIZE NO ADMIN: HCPCS | Performed by: FAMILY MEDICINE

## 2022-10-04 RX ORDER — METFORMIN HYDROCHLORIDE 500 MG/1
500 TABLET, EXTENDED RELEASE ORAL
Qty: 90 TABLET | Refills: 3 | Status: SHIPPED | OUTPATIENT
Start: 2022-10-04

## 2022-10-04 ASSESSMENT — ENCOUNTER SYMPTOMS
RESPIRATORY NEGATIVE: 1
GASTROINTESTINAL NEGATIVE: 1

## 2022-10-04 NOTE — PROGRESS NOTES
10/4/2022    Yuli Osullivan (:  1980) is a 36 y.o. male, here for a preventive medicine evaluation. Chief Complaint   Patient presents with    6 Month Follow-Up    Diabetes    Results     6 month eval.      Sugars look great. Lab Results   Component Value Date    LABA1C 5.1 10/03/2022    LABA1C 4.9 2022    LABA1C 5.6 2022     Lab Results   Component Value Date    LABMICR 2.98 2022    LDLCALC 73 10/03/2022    CREATININE 1.0 2022     Up 12 lbs since last appt in this office. Trying to quit smoking. Wt Readings from Last 3 Encounters:   10/04/22 285 lb 8 oz (129.5 kg)   22 276 lb 3.2 oz (125.3 kg)   22 280 lb (127 kg)     BP Readings from Last 3 Encounters:   10/04/22 136/74   22 132/78   22 134/76         Patient Active Problem List   Diagnosis    Tobacco abuse    Dyslipidemia    Nasal obstruction    Deviated nasal septum    Chronic maxillary sinusitis    Hypertrophy of inferior nasal turbinate    Excessive cerumen in both ear canals    Shoulder pain    Right shoulder tendonitis    Obstructive sleep apnea    Morbid obesity with BMI of 40.0-44.9, adult (HCC)    Restless sleeper    Claustrophobia    Daytime somnolence    Atypical chest pain    Anxiety    IFG (impaired fasting glucose)    Metabolic syndrome    Vitamin D deficiency    Nasal septal deviation    Niecy bullosa, bilateral middle turbinate       Review of Systems   Constitutional: Negative. HENT: Negative. Respiratory: Negative. Cardiovascular: Negative. Gastrointestinal: Negative. Musculoskeletal: Negative. All other systems reviewed and are negative. Prior to Visit Medications    Medication Sig Taking?  Authorizing Provider   metFORMIN (GLUCOPHAGE-XR) 500 MG extended release tablet Take 1 tablet by mouth daily (with breakfast) Yes Aide Diaz DO   lisinopril (PRINIVIL;ZESTRIL) 5 MG tablet take 1 tablet by mouth once daily Yes Aide Diaz, DO atorvastatin (LIPITOR) 20 MG tablet take 1 tablet by mouth at bedtime Yes Anthony Hameed, DO   ASPIRIN LOW DOSE 81 MG EC tablet take 1 tablet by mouth once daily Yes Anthony Hameed, DO   fenofibrate (TRICOR) 145 MG tablet take 1 tablet by mouth once daily Yes Anthony Hameed, DO   Lancets ([address]) MISC use 1 LANCET to TEST BLOOD SUGAR once daily Yes Anthony Hameed, DO   Cholecalciferol 50 MCG (2000 UT) CAPS Take 50 mcg by mouth daily Yes Doyle Ramirez PA-C   meloxicam (MOBIC) 15 MG tablet take 1 tablet by mouth once daily Yes Anthony Hameed, DO   omeprazole (PRILOSEC) 40 MG delayed release capsule take 1 capsule by mouth every morning before breakfast  Patient taking differently: as needed Yes Anthony Hameed, DO   blood glucose test strips (ONETOUCH VERIO) strip Check sugars BID. Dx: E11.9 Yes Anthony Hameed, DO   Blood Glucose Monitoring Suppl (Kanmu SYSTEM) w/Device KIT Check sugars daily. Dx: E11.9 Yes Anthony Hameed, DO   CPAP Machine MISC by Does not apply route Please change CPAP pressure to 10 cm H20. Yes Doyle Ramirez PA-C        No Known Allergies    Past Medical History:   Diagnosis Date    Anxiety     Asthma     as child    Depression     Hyperlipidemia     Metabolic syndrome 93/36/8585    Obesity     Sleep apnea     Has CPAP       Past Surgical History:   Procedure Laterality Date    APPENDECTOMY  1996    [employer] ARTHROSCOPY  01/2018    SINUS SURGERY  07/16/2014    RT.  MAXILLARY ANTROSTOMY WITH TISSUE REMOVAL SEPTOPLASTY TURBINATE REDUCTION EXCISION AND BIOPSLY LESION MOUTH        Social History     Socioeconomic History    Marital status:      Spouse name: Not on file    Number of children: 2    Years of education: 14    Highest education level: Some college, no degree   Occupational History    Not on file   Tobacco Use    Smoking status: Every Day     Packs/day: 1.50     Years: 24.00     Pack years: 36.00     Types: Cigarettes     Start date: 12/12/1995    Smokeless tobacco: Never   Vaping Use    Vaping Use: Never used   Substance and Sexual Activity    Alcohol use: No     Alcohol/week: 0.0 standard drinks    Drug use: No    Sexual activity: Not on file   Other Topics Concern    Not on file   Social History Narrative    Not on file     Social Determinants of Health     Financial Resource Strain: Low Risk     Difficulty of Paying Living Expenses: Not hard at all   Food Insecurity: No Food Insecurity    Worried About Running Out of Food in the Last Year: Never true    Ran Out of Food in the Last Year: Never true   Transportation Needs: Not on file   Physical Activity: Not on file   Stress: Not on file   Social Connections: Not on file   Intimate Partner Violence: Not on file   Housing Stability: Not on file        Family History   Problem Relation Age of Onset    Cancer Father     Arthritis Maternal Grandmother     Diabetes Maternal Grandmother     Arthritis Maternal Grandfather     Diabetes Maternal Grandfather     Heart Disease Maternal Grandfather     Kidney Disease Maternal Grandfather     Arthritis Paternal Grandmother     Diabetes Paternal Grandmother     Stroke Paternal Grandmother     Arthritis Paternal Grandfather     Diabetes Paternal Grandfather        ADVANCE DIRECTIVE: N, <no information>    Vitals:    10/04/22 0842   BP: 136/74   Site: Left Upper Arm   Position: Sitting   Cuff Size: Large Adult   Pulse: 76   Resp: 16   Weight: 285 lb 8 oz (129.5 kg)     Estimated body mass index is 40.96 kg/m² as calculated from the following:    Height as of 7/26/22: 5' 10\" (1.778 m). Weight as of this encounter: 285 lb 8 oz (129.5 kg). Physical Exam  Vitals and nursing note reviewed. Constitutional:       General: He is not in acute distress. Appearance: Normal appearance. He is well-developed. HENT:      Head: Normocephalic and atraumatic.       Right Ear: Tympanic membrane normal.      Left Ear: Tympanic membrane normal.   Eyes:      Conjunctiva/sclera: Conjunctivae normal.   Cardiovascular:      Rate and Rhythm: Normal rate and regular rhythm. Heart sounds: Normal heart sounds. No murmur heard. Pulmonary:      Effort: Pulmonary effort is normal.      Breath sounds: Normal breath sounds. No wheezing, rhonchi or rales. Abdominal:      General: There is no distension. Musculoskeletal:      Cervical back: Neck supple. Skin:     General: Skin is warm and dry. Findings: No rash (on exposed surfaces). Neurological:      General: No focal deficit present. Mental Status: He is alert. Psychiatric:         Attention and Perception: Attention normal.         Mood and Affect: Mood normal.         Speech: Speech normal.         Behavior: Behavior normal. Behavior is cooperative. Thought Content: Thought content normal.         Judgment: Judgment normal.       No flowsheet data found.     Lab Results   Component Value Date/Time    CHOL 133 10/03/2022 08:56 AM    CHOL 140 04/01/2022 12:42 PM    CHOL 284 12/15/2021 09:09 AM    TRIG 100 10/03/2022 08:56 AM    TRIG 137 04/01/2022 12:42 PM    TRIG 1,093 12/15/2021 09:09 AM    HDL 40 10/03/2022 08:56 AM    HDL 41 04/01/2022 12:42 PM    HDL 28 12/15/2021 09:09 AM    LDLCALC 73 10/03/2022 08:56 AM    LDLCALC 72 04/01/2022 12:42 PM    LDLCALC SEE BELOW 12/15/2021 09:09 AM    GLUCOSE 98 04/01/2022 12:42 PM    LABA1C 5.1 10/03/2022 08:56 AM    LABA1C 4.9 07/26/2022 08:36 AM    LABA1C 5.6 04/01/2022 12:42 PM    LABA1C 9.9 12/15/2021 09:09 AM       The 10-year ASCVD risk score (Ty YIN, et al., 2019) is: 6.4%    Values used to calculate the score:      Age: 36 years      Sex: Male      Is Non- : No      Diabetic: Yes      Tobacco smoker: Yes      Systolic Blood Pressure: 762 mmHg      Is BP treated: No      HDL Cholesterol: 40 mg/dL      Total Cholesterol: 133 mg/dL      There is no immunization history on file for this patient. Health Maintenance   Topic Date Due    COVID-19 Vaccine (1) Never done    Pneumococcal 0-64 years Vaccine (1 - PCV) Never done    HIV screen  Never done    Hepatitis C screen  Never done    DTaP/Tdap/Td vaccine (1 - Tdap) Never done    Flu vaccine (1) Never done    Depression Screen  01/04/2023    Diabetic retinal exam  03/22/2023    Diabetic microalbuminuria test  04/01/2023    Diabetic foot exam  04/19/2023    A1C test (Diabetic or Prediabetic)  10/03/2023    Lipids  10/03/2023    Hepatitis A vaccine  Aged Out    Hepatitis B vaccine  Aged Out    Hib vaccine  Aged Out    Meningococcal (ACWY) vaccine  Aged Out       Assessment & Plan   Controlled type 2 diabetes mellitus without complication, without long-term current use of insulin (HCC)  -     metFORMIN (GLUCOPHAGE-XR) 500 MG extended release tablet; Take 1 tablet by mouth daily (with breakfast), Disp-90 tablet, R-3Normal  -     Comprehensive Metabolic Panel; Future  -     Hemoglobin A1C; Future  -     Microalbumin / Creatinine Urine Ratio; Future  Pure hypercholesterolemia  -     TSH with Reflex; Future  -     Lipid Panel w/ Reflex Direct LDL; Future  Gastroesophageal reflux disease, unspecified whether esophagitis present  -     CBC with Auto Differential; Future  Hypertriglyceridemia  -     Lipid Panel w/ Reflex Direct LDL; Future  Obesity (BMI 30-39.9)    -  Chronic medical problems stable  -  Labs reviewed, look fine  -  Continue current medications with the exception of Actos, will hopefully lose wt off the medication  -  Follow up with specialists as scheduled  -  Recheck labs 6 mos    Return in about 6 months (around 4/4/2023) for DM2. Consider GLP-1 at that time depending on A1C and weight.          --Ramiro Current, DO

## 2022-10-04 NOTE — PATIENT INSTRUCTIONS
You may receive a survey about your visit with us today. The feedback from our patients helps us identify what is working well and where the service to all patients can be enhanced. Thank you! Tobacco Cessation Programs     Telephonic behavior modification  1-800-QUIT-NOW (587-1915)  Counseling service for those who are ready to quit using tobacco.    Available for uninsured [place] residents, Medicaid recipients, pregnant women, or patients whose health plans or employers are members of the [address] [place] [address] behavior modification  http://[place]. Quitlogix. org  Online support program to help patients through each step of the quitting process. Available 24 hours a day 7 days a week. Provides up to date researched based tool, step-by-step guides, and motivational messages. Online behavior modification  www.lungusa.org/stop-smoking/how-to-quit  HelpLine: 1-800-LUNG-USA (771-8623)  Email questions to:  Marloncristina@Alticast. org   Website offers resources to help tobacco users figure out their reasons for quitting and then take the big step of quitting for good. Hypnosis  Location: [facility], [address], [place]  Contact: Canelo Corbin, PhD at [phone]  Hypnosis for tobacco cessation  Cost $225 for the initial session and $175 for each session afterwards. Most patients require 6-8 sessions. There is the option to submit through the patients insurance. Hypnosis and behavior modification  Location: [place] [address],  [address] [place] [address], [place]  Contact: Vianca Calderon, PhD at [phone]  Counseling and hypnosis for nicotine addition  Cost: For uninsured patients:  Please call above phone number  Cost for insured patients depends on patients insurance plan.     Behavior modification  Location: [facility], [address] [address]: [phone]  Services include four one-on-one appointments between the patient and a respiratory therapist.  The four appointments span over three weeks. The respiratory therapist schedules one of the appointments to occur 48 hours after the patients quit date. Cost $100 total for the four sessions.   Tobacco cessation products are not included in the cost and are not provided by [facility].

## 2022-12-21 DIAGNOSIS — E78.00 PURE HYPERCHOLESTEROLEMIA: ICD-10-CM

## 2022-12-21 RX ORDER — ASPIRIN 81 MG/1
TABLET, COATED ORAL
Qty: 30 TABLET | Refills: 2 | Status: SHIPPED | OUTPATIENT
Start: 2022-12-21

## 2022-12-21 RX ORDER — FENOFIBRATE 145 MG/1
TABLET, COATED ORAL
Qty: 30 TABLET | Refills: 2 | Status: SHIPPED | OUTPATIENT
Start: 2022-12-21

## 2022-12-21 RX ORDER — ATORVASTATIN CALCIUM 20 MG/1
TABLET, FILM COATED ORAL
Qty: 30 TABLET | Refills: 2 | Status: SHIPPED | OUTPATIENT
Start: 2022-12-21

## 2022-12-21 RX ORDER — LISINOPRIL 5 MG/1
TABLET ORAL
Qty: 30 TABLET | Refills: 2 | Status: SHIPPED | OUTPATIENT
Start: 2022-12-21

## 2023-01-23 RX ORDER — BLOOD SUGAR DIAGNOSTIC
STRIP MISCELLANEOUS
Qty: 200 STRIP | Refills: 3 | Status: SHIPPED | OUTPATIENT
Start: 2023-01-23

## 2023-02-08 ENCOUNTER — OFFICE VISIT (OUTPATIENT)
Dept: INTERNAL MEDICINE CLINIC | Age: 43
End: 2023-02-08

## 2023-02-08 VITALS
WEIGHT: 281.2 LBS | BODY MASS INDEX: 40.26 KG/M2 | DIASTOLIC BLOOD PRESSURE: 94 MMHG | HEART RATE: 68 BPM | SYSTOLIC BLOOD PRESSURE: 120 MMHG | TEMPERATURE: 97.5 F | HEIGHT: 70 IN

## 2023-02-08 DIAGNOSIS — E11.9 TYPE 2 DIABETES MELLITUS WITHOUT COMPLICATION, WITHOUT LONG-TERM CURRENT USE OF INSULIN (HCC): Primary | ICD-10-CM

## 2023-02-08 LAB — HBA1C MFR BLD: 5.3 % (ref 4.3–5.7)

## 2023-02-08 NOTE — PATIENT INSTRUCTIONS
You have to get your exercise program in place      -start with daily effort sitting in the chair             Stretch bands exercises with arms and legs             Small weights             Crunches, bridges, leg lifts on the bed or floor       *at least 20 minutes exercise 5-6 days per week  Ask Dr. Mario Hogan about once weekly shot to help with weight loss/ blood            Sugar        Ozempic may be the easiest one to get             Trulicity or Dossie Bachelor is the daily pill that does the same thing-may be easier to                        Get  Keep checking your blood sugars every day          Goal  before eating a meal             2hours after eating a meal = under 160  Weight loss goal:  lose 5-10 # over the next 3 months  We will will talk to Dr. Mario Hogan about your Metformin dose

## 2023-02-08 NOTE — PROGRESS NOTES
[facility]  [facility] [address] [facility], [address]  [phone] (phone)  [phone] (fax)    Patient ID: Benja Salazar 1980  Referring Provider: Dr. Renee Perez     Patient's name and  were verified. Subjective:    He presents for His follow-up diabetic visit. He has type 2 diabetes mellitus. Home regimen includes: Biguanide He is compliant some of the time. Assessment:     Lab Results   Component Value Date/Time    LABA1C 5.3 2023 02:59 PM    LABA1C 5.1 10/03/2022 08:56 AM    BUN 17 2022 12:42 PM    CREATININE 1.0 2022 12:42 PM     Vitals:    23 1848 23 1849   BP: (!) 151/88 (!) 120/94   Site: Right Upper Arm Left Upper Arm   Position: Sitting Sitting   Pulse: 68    Temp: 97.5 °F (36.4 °C)    Weight: 281 lb 3.2 oz (127.6 kg)    Height: 5' 10\" (1.778 m)      Wt Readings from Last 3 Encounters:   23 281 lb 3.2 oz (127.6 kg)   10/04/22 285 lb 8 oz (129.5 kg)   22 276 lb 3.2 oz (125.3 kg)     Ht Readings from Last 3 Encounters:   23 5' 10\" (1.778 m)   22 5' 10\" (1.778 m)   22 5' 10\" (1.778 m)       Diabetes Pharmacotherapy:  Metformin XR 500mg daily    Glucose Trends:   Glucose at 4 hrs PPD today resulted at 91mg/dl  Current monitoring regimen: Fingerstick blood tests - 1-2 times daily  Home blood sugar trends:    -Fasting AM: 124-135   -Before lunch:   -Before dinner:114, 99   -Bedtime: 138  Any episodes of hypoglycemia? no   -Treats with n/a    Lifestyle Factors:   Previous visit with dietician: yes - 2/15/2022  Current diet: B: skips            L: arteaga/ tomato sandwich                       D: chili/ crackers/ cheese                               Steak/ potato/ spoon of mac n cheese                       Snacks: very rare sweet or SF chocolate                               [place] [facility] sticks/ cheese crackers/ chips/ salsa                       Beverages:bottled water; diet crangrape; 1-2 0 sodas per week  Current exercise: none.  No judy with winter    Health Maintenance:  Eye exam current (within one year): yes Date: 3/2022, DR. Johns  Any history of foot problems? no  Foot exam up to date: yes Date: 4/19/22  Immunizations up to date:    Pneumonia: no   Influenza: no  The 10-year ASCVD risk score (Ty YIN, et al., 2019) is: 5.6%    Values used to calculate the score:      Age: 39 years      Sex: Male      Is Non- : No      Diabetic: Yes      Tobacco smoker: Yes      Systolic Blood Pressure: 806 mmHg      Is BP treated: No      HDL Cholesterol: 40 mg/dL      Total Cholesterol: 133 mg/dL   Last panel - LDL:   Lab Results   Component Value Date    LDLCALC 73 10/03/2022    LDLDIRECT 100.74 12/15/2021     Taking ASA:  Yes   Taking statin: Yes - atorvastatin  Last microalbumin/creatinine ratio: No results found for: MCACR, MALBCR, MALBCRRATEXT   Taking ACE/ARB: Yes- lisinopril      Focus:   Diabetes Education. Last A1C: 5.3% 2/8/23. Weight is stable; no success with weight loss. Glucose levels are not an issue. There is minimal exercise efforts in place r/t hip/ knee discomfort. He is very frustrated with lack fo weight loss, but we focused on need to increase metabolism/ movement. Would it be beneficial to consider GLP1 therapy to help keep blood sugars in goal and facilitate weight loss efforts? Will discuss with PCP.       Curriculum Area Focus: Glucose checks/goals, Physical activity goals, Pattern management, and Lifestyle & healthy coping  DSME PLAN:     You have to get your exercise program in place      -start with daily effort sitting in the chair             Stretch bands exercises with arms and legs             Small weights             Crunches, bridges, leg lifts on the bed or floor       *at least 20 minutes exercise 5-6 days per week  Ask Dr. Manuel Root about once weekly shot to help with weight loss/ blood            Sugar        Ozempic may be the easiest one to get             [facility] or [facility] Rybelsus is the daily pill that does the same thing-may be easier to                        Get  Keep checking your blood sugars every day          Goal  before eating a meal             2hours after eating a meal = under 160  Weight loss goal:  lose 5-10 # over the next 3 months  We will will talk to Dr. Georgina Russell about your Metformin dose     Goals Addressed                   This Visit's Progress     3 meals per day; 30-60 grams carb per meal   Not on track     check blood sugars once daily --fasting or 2 hrs after eating   Not on track     Exercise 20-30 minutes 4-5 days per week   On track           Meter download, medications, PMH and nursing assessment reviewed. Will Lani states He is willing to participate in this plan of care and verbalized understanding of all instructions provided. Teach back used to verify comprehension. Follow-up: 6 weeks with RD    Total time involved in direct patient education: 60 minutes.

## 2023-02-11 DIAGNOSIS — E11.9 CONTROLLED TYPE 2 DIABETES MELLITUS WITHOUT COMPLICATION, WITHOUT LONG-TERM CURRENT USE OF INSULIN (HCC): ICD-10-CM

## 2023-02-11 NOTE — TELEPHONE ENCOUNTER
Diabetes Pharmacotherapy:  Metformin XR 500mg daily     Glucose Trends:   Glucose at 4 hrs PPD today resulted at 91mg/dl  Current monitoring regimen: Fingerstick blood tests - 1-2 times daily  Home blood sugar trends:               -Fasting AM: 124-135              -Before lunch:              -Before dinner:114, 99              -Bedtime: 138    BMI:  40.35. Weight is stable, fluctuates 3-4 pounds. Pancho Bernardo is very frustrated with lack of weight loss with dietary changes. There is still room for improved, but he has been doing well. There is minimal activity-worked on exercise goals today. Dr. Maya Lehamn,       Would Gabo benefit from a GLP1 therapy to facilitate his weight loss efforts and continued glucose control? Or would there be benefit in increasing Metformin to BID? He should not get hypoglycemia from either. Please advise.

## 2023-03-03 RX ORDER — METFORMIN HYDROCHLORIDE 500 MG/1
500 TABLET, EXTENDED RELEASE ORAL 2 TIMES DAILY WITH MEALS
Qty: 180 TABLET | Refills: 3 | Status: SHIPPED | OUTPATIENT
Start: 2023-03-03

## 2023-03-03 NOTE — TELEPHONE ENCOUNTER
Gabo instructed to increase Metformin 500mg XR to 2 times daily with bkfst and dinner. Dr. Renee Perez will discuss once weekly injection option at next office visit. Gabo voiced understanding of these instructions via teach back. Dr. Renee Perez,    Please sign attached script for Metformin XR 500mg BID. Thank you.

## 2023-03-27 ENCOUNTER — OFFICE VISIT (OUTPATIENT)
Dept: INTERNAL MEDICINE CLINIC | Age: 43
End: 2023-03-27
Payer: COMMERCIAL

## 2023-03-27 VITALS — WEIGHT: 283 LBS | BODY MASS INDEX: 40.61 KG/M2

## 2023-03-27 DIAGNOSIS — E11.9 TYPE 2 DIABETES MELLITUS WITHOUT COMPLICATION, WITHOUT LONG-TERM CURRENT USE OF INSULIN (HCC): Primary | ICD-10-CM

## 2023-03-27 PROCEDURE — 97803 MED NUTRITION INDIV SUBSEQ: CPT | Performed by: DIETITIAN, REGISTERED

## 2023-03-27 PROCEDURE — 99999 PR OFFICE/OUTPT VISIT,PROCEDURE ONLY: CPT | Performed by: DIETITIAN, REGISTERED

## 2023-03-27 NOTE — PROGRESS NOTES
Agreeable.  -Evaluation of education: Indicates understanding.  -Readiness to change: action - ready to set action plan and implement dietary changes. -Expected compliance: good. Thank you for your referral of this patient. Total time involved in direct patient education: 45 minutes for initial MNT visit.

## 2023-04-03 DIAGNOSIS — E78.00 PURE HYPERCHOLESTEROLEMIA: ICD-10-CM

## 2023-04-03 RX ORDER — ATORVASTATIN CALCIUM 20 MG/1
TABLET, FILM COATED ORAL
Qty: 30 TABLET | Refills: 2 | Status: SHIPPED | OUTPATIENT
Start: 2023-04-03

## 2023-04-03 RX ORDER — LISINOPRIL 5 MG/1
TABLET ORAL
Qty: 30 TABLET | Refills: 2 | Status: SHIPPED | OUTPATIENT
Start: 2023-04-03

## 2023-04-03 RX ORDER — FENOFIBRATE 145 MG/1
TABLET, COATED ORAL
Qty: 30 TABLET | Refills: 2 | Status: SHIPPED | OUTPATIENT
Start: 2023-04-03

## 2023-04-03 RX ORDER — ASPIRIN 81 MG/1
TABLET, COATED ORAL
Qty: 30 TABLET | Refills: 2 | Status: SHIPPED | OUTPATIENT
Start: 2023-04-03

## 2023-04-12 PROBLEM — M25.762 OSTEOPHYTE OF LEFT KNEE: Status: ACTIVE | Noted: 2023-04-12

## 2023-04-12 PROBLEM — M94.262 CHONDROMALACIA OF LEFT KNEE: Status: ACTIVE | Noted: 2023-04-12

## 2023-04-12 PROBLEM — M25.562 ACUTE PAIN OF LEFT KNEE: Status: ACTIVE | Noted: 2023-04-12

## 2023-05-08 RX ORDER — MELOXICAM 15 MG/1
TABLET ORAL
Qty: 90 TABLET | Refills: 3 | Status: SHIPPED | OUTPATIENT
Start: 2023-05-08

## 2023-05-24 ENCOUNTER — OFFICE VISIT (OUTPATIENT)
Dept: INTERNAL MEDICINE CLINIC | Age: 43
End: 2023-05-24
Payer: COMMERCIAL

## 2023-05-24 VITALS
SYSTOLIC BLOOD PRESSURE: 136 MMHG | HEART RATE: 71 BPM | HEIGHT: 70 IN | TEMPERATURE: 98.2 F | BODY MASS INDEX: 38.91 KG/M2 | WEIGHT: 271.8 LBS | DIASTOLIC BLOOD PRESSURE: 84 MMHG

## 2023-05-24 DIAGNOSIS — E11.9 TYPE 2 DIABETES MELLITUS WITHOUT COMPLICATION, WITHOUT LONG-TERM CURRENT USE OF INSULIN (HCC): Primary | ICD-10-CM

## 2023-05-24 PROCEDURE — G0108 DIAB MANAGE TRN  PER INDIV: HCPCS | Performed by: REGISTERED NURSE

## 2023-05-24 NOTE — PROGRESS NOTES
[facility]  [address] [address] [address], [facility], [address]  [phone] (phone)  [phone] (fax)    Patient ID: Rodney Corey 1980  Referring Provider: Dr. Wendie Balderas     Patient's name and  were verified. Subjective:    He presents for a follow-up diabetic visit. He has type 2 diabetes mellitus. Home regimen includes: Biguanide He is compliant some of the time.   Assessment:     Lab Results   Component Value Date/Time    LABA1C 5.8 04/10/2023 09:29 AM    BUN 13 04/10/2023 09:29 AM    CREATININE 1.0 04/10/2023 09:29 AM     Vitals:    23 1336 23 1338   BP: (!) 139/94 136/84   Site: Right Upper Arm Left Upper Arm   Position: Sitting Sitting   Pulse: 71    Temp: 98.2 °F (36.8 °C)    Weight: 271 lb 12.8 oz (123.3 kg)    Height: 5' 10\" (1.778 m)      Wt Readings from Last 3 Encounters:   23 271 lb 12.8 oz (123.3 kg)   23 281 lb 14.4 oz (127.9 kg)   23 286 lb 9.6 oz (130 kg)     Ht Readings from Last 3 Encounters:   23 5' 10\" (1.778 m)   23 5' 10\" (1.778 m)   23 5' 10\" (1.778 m)       Diabetes Pharmacotherapy:  Metformin XR 500mg BID    Glucose Trends:   Glucose at 4.5 hrs PPD today resulted at 103mg/dl  Current monitoring regimen: Fingerstick blood tests - every other day  Home blood sugar trends:    -Fasting AM: 's   -after lunch: 130-140   -Before dinner:   -Bedtime:  Any episodes of hypoglycemia? no   -Treats with na    Lifestyle Factors:   Previous visit with dietician: yes  - 2/15/2022  Current diet: B: skip 50% saus/egg/ corn beef hash            L: burgers/ 1 bun; few potato chips                       D: protein/ some pasta                                 Roast/ veggies/ cabbage                       Snacks: turkey sausage sticks; grapes; veggies                       Beverages:   Current exercise: ADL's; increased activity with buying/ updating new house    Health Maintenance:    Eye exam current (within one year): yes Date: 3/2023,

## 2023-05-24 NOTE — PATIENT INSTRUCTIONS
Keep checking blood sugars no less than 3 times per week     Before meal goal           2hours after eating --under 150/160  Continue with dietary efforts--keep setting limits on portions           Watch carbohydrates and avoid too much fat  Stay active.  Make a plan for exercise if your work begins to decline    --walking, etc  Weight loss goal:  under 265#

## 2023-07-10 DIAGNOSIS — E78.00 PURE HYPERCHOLESTEROLEMIA: ICD-10-CM

## 2023-07-10 RX ORDER — ATORVASTATIN CALCIUM 20 MG/1
TABLET, FILM COATED ORAL
Qty: 30 TABLET | Refills: 2 | Status: SHIPPED | OUTPATIENT
Start: 2023-07-10

## 2023-07-10 RX ORDER — ASPIRIN 81 MG/1
TABLET, COATED ORAL
Qty: 30 TABLET | Refills: 2 | Status: SHIPPED | OUTPATIENT
Start: 2023-07-10

## 2023-07-10 RX ORDER — FENOFIBRATE 145 MG/1
TABLET, COATED ORAL
Qty: 30 TABLET | Refills: 2 | Status: SHIPPED | OUTPATIENT
Start: 2023-07-10

## 2023-07-27 NOTE — PROGRESS NOTES
[facility]      Josue Thomas         [id number]  7/28/2023   Chief Complaint   Patient presents with    Follow-up     3 month SUSANA follow up with download and medication check, patient of Galen Rivero. Pt of Dr. Magen Titus     PAP Download:   Original or initial AHI: 11.5     Date of initial study: 4/7/15      Compliant  100%     Noncompliant 0 %     PAP Type CPAP Level  10 cmH2O  Avg Hrs/Day 8 hours  AHI: 0.2   Recorded compliance dates: 6/27/23 to 7/26/23  Machine/Mfg:   [x] ResMed    [] Respironics/Dreamstation   Interface:   [] Nasal    [x] Nasal pillows   [] FFM        Provider:      [x] HAYDE     []Mir     [] Tonny    [] Brock Pyle    [] Pia                           [] [facility]      [] Adaptive    [] [facility] Dr:      [] Other    Neck Size: 17.25\"  Mallampati Mallampati 4  ESS:  5  SAQLI: 90    Here is a scan of the most recent download:      Presentation:   Irving Caldera presents for sleep medicine follow up for obstructive sleep apnea, insomnia  Since the last visit, Irving Caldera is doing well on CPAP. Patient was last evaluated by GITA Magaña in April 2023. At that time, he was started on Trazodone for insomnia and was to trial atrovent nasal spray for nasal congestion. He has tried and failed flonase and astelin in the past. He has history of sinus surgery and surgery for deviated septum years ago. He reports that he had septoplasty, removal of nasal polyps, and sinus surgery completed by Dr. Joseph Cabello with [facility] in 2014. He was last evaluated by MERCEDES Mcwilliams DNP with ENT in 2019. At that time he did not wish for surgery. He reports that he is still suffering with nasal congestion. He is not currently taking trazodone and denies difficulty with falling asleep. He reports that Thao Glaser had previously prescribed him vitamin D supplement, however this was not refilled per patient. Uncertain why this was not refilled.  Last vitamin D levels are

## 2023-07-28 ENCOUNTER — OFFICE VISIT (OUTPATIENT)
Dept: PULMONOLOGY | Age: 43
End: 2023-07-28
Payer: COMMERCIAL

## 2023-07-28 VITALS
HEIGHT: 70 IN | WEIGHT: 267.4 LBS | DIASTOLIC BLOOD PRESSURE: 86 MMHG | HEART RATE: 67 BPM | BODY MASS INDEX: 38.28 KG/M2 | SYSTOLIC BLOOD PRESSURE: 132 MMHG | OXYGEN SATURATION: 97 %

## 2023-07-28 DIAGNOSIS — J34.3 HYPERTROPHY OF INFERIOR NASAL TURBINATE: ICD-10-CM

## 2023-07-28 DIAGNOSIS — G47.33 OBSTRUCTIVE SLEEP APNEA: Primary | ICD-10-CM

## 2023-07-28 DIAGNOSIS — E55.9 VITAMIN D DEFICIENCY: ICD-10-CM

## 2023-07-28 DIAGNOSIS — J34.2 DEVIATED NASAL SEPTUM: ICD-10-CM

## 2023-07-28 DIAGNOSIS — Z87.09 HISTORY OF NASAL POLYP: ICD-10-CM

## 2023-07-28 PROCEDURE — G8417 CALC BMI ABV UP PARAM F/U: HCPCS

## 2023-07-28 PROCEDURE — G8427 DOCREV CUR MEDS BY ELIG CLIN: HCPCS

## 2023-07-28 PROCEDURE — 4004F PT TOBACCO SCREEN RCVD TLK: CPT

## 2023-07-28 PROCEDURE — 99214 OFFICE O/P EST MOD 30 MIN: CPT

## 2023-07-28 ASSESSMENT — ENCOUNTER SYMPTOMS
RHINORRHEA: 0
SORE THROAT: 0
RESPIRATORY NEGATIVE: 1

## 2023-08-01 ENCOUNTER — OFFICE VISIT (OUTPATIENT)
Dept: INTERNAL MEDICINE CLINIC | Age: 43
End: 2023-08-01
Payer: COMMERCIAL

## 2023-08-01 VITALS
TEMPERATURE: 98.2 F | WEIGHT: 266.2 LBS | BODY MASS INDEX: 38.11 KG/M2 | DIASTOLIC BLOOD PRESSURE: 88 MMHG | SYSTOLIC BLOOD PRESSURE: 138 MMHG | HEIGHT: 70 IN | HEART RATE: 62 BPM

## 2023-08-01 DIAGNOSIS — E11.9 TYPE 2 DIABETES MELLITUS WITHOUT COMPLICATION, WITHOUT LONG-TERM CURRENT USE OF INSULIN (HCC): Primary | ICD-10-CM

## 2023-08-01 LAB — HBA1C MFR BLD: 5.2 %

## 2023-08-01 PROCEDURE — G0108 DIAB MANAGE TRN  PER INDIV: HCPCS | Performed by: REGISTERED NURSE

## 2023-08-01 PROCEDURE — 83037 HB GLYCOSYLATED A1C HOME DEV: CPT | Performed by: REGISTERED NURSE

## 2023-08-01 PROCEDURE — NBSRV NON-BILLABLE SERVICE: Performed by: REGISTERED NURSE

## 2023-08-01 NOTE — PATIENT INSTRUCTIONS
Stayed focused on continued weight loss --you are doing good! Think about limiting high fat meats--arteaga/sausage/ cheesburger          And make sure you are getting plenty of vegetables                 -at least 2 servings or more each day  Stay active--when not swimming or yard work; plan walking or                 A work out!!   Blood sugar goals waking up:                          2hours after eating:  under 150              *make sure your check your blood sugar no less than                          3 times per week

## 2023-08-01 NOTE — PROGRESS NOTES
per week 30-60 min--active    Health Maintenance:  Eye exam current (within one year): yes Date: 3/2023, Dr. Gerardo Vinson  Any history of foot problems? no  Foot exam up to date: yes Date: 5/24/2023, D< clinic  Immunizations up to date:    Pneumonia (dose of Prevnar 20 OR Pneumovax 23 + Prevnar 13): no   Influenza: no  The ASCVD Risk score (Ty YIN, et al., 2019) failed to calculate for the following reasons: The valid total cholesterol range is 130 to 320 mg/dL   Last panel - LDL:   Lab Results   Component Value Date    LDLCALC 68 04/10/2023    LDLDIRECT 100.74 12/15/2021     Taking ASA:  Yes   Taking statin: Yes - atorvastatin  Last microalbumin/creatinine ratio:   Microalb/Creat Ratio   Date Value Ref Range Status   04/10/2023 17 0 - 30 mg/g Final     Comment:     Performed at [address], [address]      Taking ACE/ARB: Yes- lisinopril  Depression screening completed. 4/12/2023    Focus:   Diabetes Education. Last A1C: 5.2%  8/1/2023. MILAGROS is doing well with glucose control; weight is stable (down 20# since the beginning of the year). He is active with the summer months-swimming and working outdoors, but a plan needs to be set for fall and winter activity. MILAGROS is doing well eating low carb; but has increased protein intake. Many of his protein choices are high fat. Dicussed lower fat protein choices for heart health/ weight/ etc and less frequency of high fat/ bad fat choices. He declines seeing a dietician, but states he will try to make some changes. Encouragement given. Curriculum Area Focus: Diabetes complications, Glucose checks/goals, Carbohydrate counting/meal planning, Physical activity goals, and Hypoglycemia management  DSME PLAN:     Stayed focused on continued weight loss --you are doing good!   Think about limiting high fat meats--arteaga/sausage/ cheesburger          And make sure you are getting plenty of vegetables                 -at least 2 servings or more each

## 2023-10-02 DIAGNOSIS — E78.00 PURE HYPERCHOLESTEROLEMIA: ICD-10-CM

## 2023-10-02 RX ORDER — ATORVASTATIN CALCIUM 20 MG/1
TABLET, FILM COATED ORAL
Qty: 30 TABLET | Refills: 2 | Status: SHIPPED | OUTPATIENT
Start: 2023-10-02

## 2023-10-02 RX ORDER — ACETAMINOPHEN/DIPHENHYDRAMINE 500MG-25MG
81 TABLET ORAL DAILY
Qty: 30 TABLET | Refills: 2 | Status: SHIPPED | OUTPATIENT
Start: 2023-10-02

## 2023-10-02 RX ORDER — FENOFIBRATE 145 MG/1
TABLET, COATED ORAL
Qty: 30 TABLET | Refills: 2 | Status: SHIPPED | OUTPATIENT
Start: 2023-10-02

## 2023-10-10 ENCOUNTER — TELEPHONE (OUTPATIENT)
Dept: FAMILY MEDICINE CLINIC | Age: 43
End: 2023-10-10

## 2023-10-10 NOTE — TELEPHONE ENCOUNTER
The patient called in and stated that he has an appt Friday and wants to know if he is due for any labs. He uses [facility].     Patient is requesting a call back

## 2023-10-13 ENCOUNTER — OFFICE VISIT (OUTPATIENT)
Dept: FAMILY MEDICINE CLINIC | Age: 43
End: 2023-10-13
Payer: COMMERCIAL

## 2023-10-13 VITALS
BODY MASS INDEX: 38.71 KG/M2 | WEIGHT: 269.8 LBS | HEART RATE: 68 BPM | DIASTOLIC BLOOD PRESSURE: 72 MMHG | RESPIRATION RATE: 16 BRPM | SYSTOLIC BLOOD PRESSURE: 130 MMHG

## 2023-10-13 DIAGNOSIS — E66.9 OBESITY (BMI 30-39.9): ICD-10-CM

## 2023-10-13 DIAGNOSIS — E78.1 HYPERTRIGLYCERIDEMIA: ICD-10-CM

## 2023-10-13 DIAGNOSIS — E78.00 PURE HYPERCHOLESTEROLEMIA: ICD-10-CM

## 2023-10-13 DIAGNOSIS — E11.9 CONTROLLED TYPE 2 DIABETES MELLITUS WITHOUT COMPLICATION, WITHOUT LONG-TERM CURRENT USE OF INSULIN (HCC): Primary | ICD-10-CM

## 2023-10-13 DIAGNOSIS — K21.9 GASTROESOPHAGEAL REFLUX DISEASE, UNSPECIFIED WHETHER ESOPHAGITIS PRESENT: ICD-10-CM

## 2023-10-13 DIAGNOSIS — I10 HYPERTENSION, UNSPECIFIED TYPE: ICD-10-CM

## 2023-10-13 DIAGNOSIS — E55.9 VITAMIN D DEFICIENCY: ICD-10-CM

## 2023-10-13 PROCEDURE — 3075F SYST BP GE 130 - 139MM HG: CPT | Performed by: FAMILY MEDICINE

## 2023-10-13 PROCEDURE — 99214 OFFICE O/P EST MOD 30 MIN: CPT | Performed by: FAMILY MEDICINE

## 2023-10-13 PROCEDURE — 4004F PT TOBACCO SCREEN RCVD TLK: CPT | Performed by: FAMILY MEDICINE

## 2023-10-13 PROCEDURE — 3044F HG A1C LEVEL LT 7.0%: CPT | Performed by: FAMILY MEDICINE

## 2023-10-13 PROCEDURE — G8417 CALC BMI ABV UP PARAM F/U: HCPCS | Performed by: FAMILY MEDICINE

## 2023-10-13 PROCEDURE — G8484 FLU IMMUNIZE NO ADMIN: HCPCS | Performed by: FAMILY MEDICINE

## 2023-10-13 PROCEDURE — G8427 DOCREV CUR MEDS BY ELIG CLIN: HCPCS | Performed by: FAMILY MEDICINE

## 2023-10-13 PROCEDURE — 2022F DILAT RTA XM EVC RTNOPTHY: CPT | Performed by: FAMILY MEDICINE

## 2023-10-13 PROCEDURE — 3078F DIAST BP <80 MM HG: CPT | Performed by: FAMILY MEDICINE

## 2023-10-13 ASSESSMENT — PATIENT HEALTH QUESTIONNAIRE - PHQ9
2. FEELING DOWN, DEPRESSED OR HOPELESS: 0
SUM OF ALL RESPONSES TO PHQ QUESTIONS 1-9: 0
SUM OF ALL RESPONSES TO PHQ9 QUESTIONS 1 & 2: 0
SUM OF ALL RESPONSES TO PHQ QUESTIONS 1-9: 0
SUM OF ALL RESPONSES TO PHQ QUESTIONS 1-9: 0
1. LITTLE INTEREST OR PLEASURE IN DOING THINGS: 0
SUM OF ALL RESPONSES TO PHQ QUESTIONS 1-9: 0

## 2023-10-13 ASSESSMENT — ENCOUNTER SYMPTOMS
GASTROINTESTINAL NEGATIVE: 1
RESPIRATORY NEGATIVE: 1

## 2023-10-13 NOTE — PROGRESS NOTES
Allen Terry (:  1980) is a 39 y.o. male,Established patient, here for evaluation of the following chief complaint(s):  6 Month Follow-Up and Diabetes          Subjective   SUBJECTIVE/OBJECTIVE:  HPI  Chief Complaint   Patient presents with    6 Month Follow-Up    Diabetes     6 month eval.    Doing well overall.     Lab Results   Component Value Date    LABA1C 5.2 2023    LABA1C 5.8 04/10/2023    LABA1C 5.3 2023     Lab Results   Component Value Date    MALBCR 17 04/10/2023    LDLCALC 68 04/10/2023    CREATININE 1.0 04/10/2023     BP Readings from Last 3 Encounters:   10/13/23 130/72   23 138/88   23 132/86     Wt Readings from Last 3 Encounters:   10/13/23 269 lb 12.8 oz (122.4 kg)   23 266 lb 3.2 oz (120.7 kg)   23 267 lb 6.4 oz (121.3 kg)       Patient Active Problem List   Diagnosis    Tobacco abuse    Dyslipidemia    Nasal obstruction    Deviated nasal septum    Chronic maxillary sinusitis    Hypertrophy of inferior nasal turbinate    Excessive cerumen in both ear canals    Shoulder pain    Right shoulder tendonitis    Obstructive sleep apnea    Morbid obesity with BMI of 40.0-44.9, adult (HCC)    Restless sleeper    Claustrophobia    Daytime somnolence    Atypical chest pain    Anxiety    IFG (impaired fasting glucose)    Metabolic syndrome    Vitamin D deficiency    Nasal septal deviation    Niecy bullosa, bilateral middle turbinate    Acute pain of left knee    Chondromalacia of left knee    Osteophyte of left knee       Current Outpatient Medications   Medication Sig Dispense Refill    atorvastatin (LIPITOR) 20 MG tablet take 1 tablet by mouth at bedtime 30 tablet 2    fenofibrate (TRICOR) 145 MG tablet take 1 tablet by mouth once daily 30 tablet 2    RA ASPIRIN EC 81 MG EC tablet take 1 tablet by mouth once daily 30 tablet 2    meloxicam (MOBIC) 15 MG tablet take 1 tablet by mouth once daily 90 tablet 3    lisinopril (PRINIVIL;ZESTRIL) 20 MG tablet Take 1

## 2023-10-17 NOTE — PROGRESS NOTES
[facility] EAR, NOSE AND THROAT  [address]  Dept: [phone]  Dept Fax: [phone]  Loc: [address] [place] [address] Richard Isidro is a 39 y.o. male who was referred by Sebastian Salcedo* for:  Chief Complaint   Patient presents with    New Patient     New patient is here for SUSANA, DNS, hypertrophy of inferior nasal turbinate, and hx of nasal polyps, referred by HUE Wilhelm. Patient had surgery in 2014 for polyps and DNS. He states that one side is always congested and it changes from side to side. Patient does snore and uses CPAP. He says that if he falls asleep without the CPAP his epiglottis swells up. Shantal Fabian HPI:     Allan Landis presents today for sinonasal complaints. Patient was referred by Sebastian Salcedo*; notes reviewed. Patient reports having surgery by Dr. Obdulio Alcantaar ENT in 2014: right maxillary antrostomy with septoplasty, turbinoplasty, and excision of palate lesion. He endorses that approximately a year and a half after his surgery he states he was finally able to breathe through his nose and he reports after that year and a half that his nose feels like it went back to to the surgery. Patient states that he always feels like one half of his nose is congested and it rotates from side to side all day long. He also endorses having sinus pressure remaining to bilateral cheeks and forehead. Patient denies any recent antibiotics for sinus associated pain or infection. He states that he has a CPAP for diagnosed obstructive sleep apnea and endorses that he sleeps significantly well with it. He endorses that he is currently utilizing a nasal pillow for his CPAP machine. Patient does note that when he falls asleep in the couch without his CPAP he feels like he wakes up and his uvula is significantly swollen. He denies a history of recurrent strep throat infections.  He states that in his

## 2023-10-18 ENCOUNTER — OFFICE VISIT (OUTPATIENT)
Dept: ENT CLINIC | Age: 43
End: 2023-10-18
Payer: COMMERCIAL

## 2023-10-18 VITALS
HEART RATE: 68 BPM | RESPIRATION RATE: 20 BRPM | BODY MASS INDEX: 39.16 KG/M2 | HEIGHT: 70 IN | WEIGHT: 273.5 LBS | DIASTOLIC BLOOD PRESSURE: 82 MMHG | OXYGEN SATURATION: 95 % | SYSTOLIC BLOOD PRESSURE: 136 MMHG | TEMPERATURE: 97.8 F

## 2023-10-18 DIAGNOSIS — J34.2 NASAL SEPTAL DEVIATION: ICD-10-CM

## 2023-10-18 DIAGNOSIS — G47.33 OSA ON CPAP: ICD-10-CM

## 2023-10-18 DIAGNOSIS — J35.1 CHRONIC TONSILLAR HYPERTROPHY: ICD-10-CM

## 2023-10-18 DIAGNOSIS — H61.21 RIGHT EAR IMPACTED CERUMEN: ICD-10-CM

## 2023-10-18 DIAGNOSIS — J34.3 HYPERTROPHY OF BOTH INFERIOR NASAL TURBINATES: Primary | ICD-10-CM

## 2023-10-18 DIAGNOSIS — J34.89 SINUS PRESSURE: ICD-10-CM

## 2023-10-18 DIAGNOSIS — R06.5 MOUTH BREATHING: ICD-10-CM

## 2023-10-18 DIAGNOSIS — R09.81 NASAL CONGESTION: ICD-10-CM

## 2023-10-18 DIAGNOSIS — K13.79 HYPERTROPHIC SOFT PALATE: ICD-10-CM

## 2023-10-18 DIAGNOSIS — Z98.890 S/P NASAL SEPTOPLASTY: ICD-10-CM

## 2023-10-18 DIAGNOSIS — Z98.890 S/P SINUS SURGERY: ICD-10-CM

## 2023-10-18 DIAGNOSIS — H91.93 DECREASED HEARING OF BOTH EARS: ICD-10-CM

## 2023-10-18 PROCEDURE — 99204 OFFICE O/P NEW MOD 45 MIN: CPT | Performed by: REGISTERED NURSE

## 2023-10-18 PROCEDURE — G8484 FLU IMMUNIZE NO ADMIN: HCPCS | Performed by: REGISTERED NURSE

## 2023-10-18 PROCEDURE — 4004F PT TOBACCO SCREEN RCVD TLK: CPT | Performed by: REGISTERED NURSE

## 2023-10-18 PROCEDURE — G8417 CALC BMI ABV UP PARAM F/U: HCPCS | Performed by: REGISTERED NURSE

## 2023-10-18 PROCEDURE — G8427 DOCREV CUR MEDS BY ELIG CLIN: HCPCS | Performed by: REGISTERED NURSE

## 2023-10-18 PROCEDURE — 69210 REMOVE IMPACTED EAR WAX UNI: CPT | Performed by: REGISTERED NURSE

## 2023-10-18 RX ORDER — FLUTICASONE PROPIONATE 50 MCG
1 SPRAY, SUSPENSION (ML) NASAL DAILY
Qty: 16 G | Refills: 2 | Status: SHIPPED | OUTPATIENT
Start: 2023-10-18

## 2023-10-18 RX ORDER — AMOXICILLIN AND CLAVULANATE POTASSIUM 875; 125 MG/1; MG/1
1 TABLET, FILM COATED ORAL 2 TIMES DAILY
Qty: 28 TABLET | Refills: 0 | Status: SHIPPED | OUTPATIENT
Start: 2023-10-18 | End: 2023-11-01

## 2023-10-20 ENCOUNTER — HOSPITAL ENCOUNTER (OUTPATIENT)
Dept: AUDIOLOGY | Age: 43
Discharge: HOME | End: 2023-10-20
Payer: COMMERCIAL

## 2023-10-20 PROCEDURE — 92557 COMPREHENSIVE HEARING TEST: CPT | Performed by: AUDIOLOGIST

## 2023-10-20 PROCEDURE — 92567 TYMPANOMETRY: CPT | Performed by: AUDIOLOGIST

## 2023-10-20 NOTE — PROGRESS NOTES
AUDIOLOGICAL EVALUATION      REASON FOR TESTING:  Audiometric evaluation per the request of HUE Richardson, due to the diagnosis of decreased hearing of both ears. The patient reports family and friends have noted gradually increasing hearing difficulties. The patient also notes occasional tinnitus, bilaterally. He denies any otalgia, otorrhea, aural fullness, vertigo, history of ear infections or previous ear surgery. There is a family history of acquired and congenital hearing loss. The patient also notes a history of loud noise exposure including right-handed firearms use with only recent use of hearing protection. OTOSCOPY: Clear external ear canals, bilaterally. AUDIOGRAM          Reliability: Good    COMMENTS: Pure tone audiogram indicates a mild to moderate \"notched\" sensorineural hearing loss in the right ear at 2000-4000Hz with otherwise normal hearing thresholds, and a mild to moderately-severe \"notched\" sensorineural hearing loss in the left ear at 2000-4000Hz and 8000Hz with normal hearing thresholds at the remaining frequencies. Slight asymmetry noted in the left ear at 3000, 4000 and 8000Hz. Speech thresholds are consistent with pure tone averages, bilaterally. Word recognition scores are excellent at 100% in the right ear and 92% in the left ear with speech presented at average conversation levels (60dB) in quiet. Tympanometry shows normal ear canal volume, peak pressure and compliance, bilaterally. A 1000Hz screening ipsilateral acoustic stapedial reflex is present at 105dB HL in the left ear and 95dB HL in the right ear. RECOMMENDATION(S):   Follow up with referring provider as planned. ENT follow up with Dr. Pastor Christopher scheduled 1/05/24. Further testing/imaging studies may be considered to rule out possible retrocochlear pathology due to slight left-sided asymmetry noted on the audiogram, per discretion of provider. Amplification could be beneficial for this patient.  However, the

## 2023-10-25 ENCOUNTER — TELEPHONE (OUTPATIENT)
Dept: ENT CLINIC | Age: 43
End: 2023-10-25

## 2023-10-25 DIAGNOSIS — H90.5 MILD SENSORINEURAL HEARING LOSS: Primary | ICD-10-CM

## 2023-10-25 NOTE — TELEPHONE ENCOUNTER
Called patient to review his audio/tymp results from 10/20/23 with him indicating mild/moderate nerve hearing loss to both ears and reviewed the slight left sided asymmetry with him. He wishes to proceed with a repeat audio/tymp in 6 months to monitor progression as well as monitoring mild asymmetrical hearing loss evident on recent audiogram (rather than further imaging such as MRI IACs at this time). Orders placed, please call patient to coordinate scheduling. Thank you!

## 2023-10-26 NOTE — TELEPHONE ENCOUNTER
Scheduled 4/25/24 @ 9:15 am       Do you want to see him same day or call with the results?     Please advise

## 2023-11-14 ENCOUNTER — HOSPITAL ENCOUNTER (OUTPATIENT)
Dept: CT IMAGING | Age: 43
Discharge: HOME | End: 2023-11-14
Attending: REGISTERED NURSE
Payer: COMMERCIAL

## 2023-11-14 DIAGNOSIS — J35.1 CHRONIC TONSILLAR HYPERTROPHY: ICD-10-CM

## 2023-11-14 DIAGNOSIS — R06.5 MOUTH BREATHING: ICD-10-CM

## 2023-11-14 DIAGNOSIS — Z98.890 S/P NASAL SEPTOPLASTY: ICD-10-CM

## 2023-11-14 DIAGNOSIS — G47.33 OSA ON CPAP: ICD-10-CM

## 2023-11-14 DIAGNOSIS — J34.3 HYPERTROPHY OF BOTH INFERIOR NASAL TURBINATES: ICD-10-CM

## 2023-11-14 DIAGNOSIS — J34.2 NASAL SEPTAL DEVIATION: ICD-10-CM

## 2023-11-14 DIAGNOSIS — R09.81 NASAL CONGESTION: ICD-10-CM

## 2023-11-14 DIAGNOSIS — J34.89 SINUS PRESSURE: ICD-10-CM

## 2023-11-14 DIAGNOSIS — Z98.890 S/P SINUS SURGERY: ICD-10-CM

## 2023-11-14 PROCEDURE — 70486 CT MAXILLOFACIAL W/O DYE: CPT

## 2023-12-27 DIAGNOSIS — E78.00 PURE HYPERCHOLESTEROLEMIA: ICD-10-CM

## 2023-12-27 RX ORDER — ATORVASTATIN CALCIUM 20 MG/1
TABLET, FILM COATED ORAL
Qty: 30 TABLET | Refills: 2 | Status: SHIPPED | OUTPATIENT
Start: 2023-12-27

## 2023-12-27 RX ORDER — ASPIRIN 81 MG/1
81 TABLET, COATED ORAL DAILY
Qty: 30 TABLET | Refills: 2 | Status: SHIPPED | OUTPATIENT
Start: 2023-12-27

## 2023-12-27 RX ORDER — FENOFIBRATE 145 MG/1
TABLET, COATED ORAL
Qty: 30 TABLET | Refills: 2 | Status: SHIPPED | OUTPATIENT
Start: 2023-12-27

## 2024-01-02 ENCOUNTER — OFFICE VISIT (OUTPATIENT)
Dept: INTERNAL MEDICINE CLINIC | Age: 44
End: 2024-01-02
Payer: COMMERCIAL

## 2024-01-02 VITALS
WEIGHT: 277.4 LBS | HEIGHT: 70 IN | TEMPERATURE: 97.3 F | SYSTOLIC BLOOD PRESSURE: 166 MMHG | HEART RATE: 72 BPM | DIASTOLIC BLOOD PRESSURE: 96 MMHG | BODY MASS INDEX: 39.71 KG/M2

## 2024-01-02 DIAGNOSIS — R73.01 IFG (IMPAIRED FASTING GLUCOSE): Primary | ICD-10-CM

## 2024-01-02 PROCEDURE — NBSRV NON-BILLABLE SERVICE: Performed by: REGISTERED NURSE

## 2024-01-02 PROCEDURE — G0108 DIAB MANAGE TRN  PER INDIV: HCPCS | Performed by: REGISTERED NURSE

## 2024-01-02 NOTE — PATIENT INSTRUCTIONS
Set a goal to get your exercise going          10-15 minutes Monday-Friday --weights/ stretching       Early in the morning, with your wife when you can  Make sure you have your healthy meal plan back in            Place from the holidays.  Keep checking blood sugars 1-2 times per day      Before meals--goal       2hours after eating --goal under 150/160

## 2024-01-02 NOTE — PROGRESS NOTES
[facility]  [address] [place] [address]  [phone] (phone)  [phone] (fax)    Patient ID: Alfonzo Briggs 1980  Referring Provider: Dr. Mcadams     Patient's name and  were verified.    Subjective:    He presents for a follow-up diabetic visit. He has type 2 diabetes mellitus. He is compliant some of the time.  Assessment:     Lab Results   Component Value Date/Time    LABA1C 5.2 2023 10:17 AM    BUN 13 04/10/2023 09:29 AM    CREATININE 1.0 04/10/2023 09:29 AM     Vitals:    24 1156 24 1158   BP: (!) 180/99 (!) 166/96   Site: Right Upper Arm Left Upper Arm   Position: Sitting Sitting   Pulse: 72    Temp: 97.3 °F (36.3 °C)    Weight: 125.8 kg (277 lb 6.4 oz)    Height: 1.778 m (5' 10\")      Wt Readings from Last 3 Encounters:   24 125.8 kg (277 lb 6.4 oz)   10/18/23 124.1 kg (273 lb 8 oz)   10/13/23 122.4 kg (269 lb 12.8 oz)     Ht Readings from Last 3 Encounters:   24 1.778 m (5' 10\")   10/18/23 1.778 m (5' 10\")   23 1.778 m (5' 10\")     Est, Glom Filt Rate   Date Value Ref Range Status   04/10/2023 >60 >60 ml/min/1.73m2 Final     Diabetes Pharmacotherapy:  Metformin XR 500mg BID    Glucose Trends:   Current monitoring regimen: Fingerstick blood tests - 2 times daily  Home blood sugar trends:    -Fasting AM: 110-137   -Before lunch: 106-168, 223   -2hrpp dinner: 126-225   -Not eating in the evenin, 82, 88  Any episodes of hypoglycemia? yes - 1-2 times in the past several months r/t                             Activity and extended   -Treats with food    Lifestyle Factors:   Previous visit with dietician: yes - 2/15/2022  Current diet: B: banana            L: pot roast sandwich/ wildfire chicken salad                       D: sauerkraut/ pork                       Snacks:cheese ball/ crackers; 2 rollos                       Beverages: water; 2 diet mt dew per week  Current exercise: ADL's; low to moderate activity              Weight set up

## 2024-02-29 ENCOUNTER — OFFICE VISIT (OUTPATIENT)
Dept: ENT CLINIC | Age: 44
End: 2024-02-29
Payer: COMMERCIAL

## 2024-02-29 VITALS
SYSTOLIC BLOOD PRESSURE: 168 MMHG | TEMPERATURE: 97.3 F | DIASTOLIC BLOOD PRESSURE: 92 MMHG | OXYGEN SATURATION: 99 % | WEIGHT: 277.8 LBS | HEIGHT: 70 IN | HEART RATE: 92 BPM | RESPIRATION RATE: 20 BRPM | BODY MASS INDEX: 39.77 KG/M2

## 2024-02-29 DIAGNOSIS — Z01.818 PREOP TESTING: Primary | ICD-10-CM

## 2024-02-29 DIAGNOSIS — G47.33 OBSTRUCTIVE SLEEP APNEA: ICD-10-CM

## 2024-02-29 DIAGNOSIS — J34.3 HYPERTROPHY OF INFERIOR NASAL TURBINATE: ICD-10-CM

## 2024-02-29 DIAGNOSIS — J34.2 DEVIATED NASAL SEPTUM: Primary | ICD-10-CM

## 2024-02-29 DIAGNOSIS — J34.89 CONCHA BULLOSA: ICD-10-CM

## 2024-02-29 DIAGNOSIS — J34.89 NASAL OBSTRUCTION: Chronic | ICD-10-CM

## 2024-02-29 PROCEDURE — 99214 OFFICE O/P EST MOD 30 MIN: CPT | Performed by: OTOLARYNGOLOGY

## 2024-02-29 PROCEDURE — G8417 CALC BMI ABV UP PARAM F/U: HCPCS | Performed by: OTOLARYNGOLOGY

## 2024-02-29 PROCEDURE — G8427 DOCREV CUR MEDS BY ELIG CLIN: HCPCS | Performed by: OTOLARYNGOLOGY

## 2024-02-29 PROCEDURE — 4004F PT TOBACCO SCREEN RCVD TLK: CPT | Performed by: OTOLARYNGOLOGY

## 2024-02-29 PROCEDURE — G8484 FLU IMMUNIZE NO ADMIN: HCPCS | Performed by: OTOLARYNGOLOGY

## 2024-02-29 NOTE — PROGRESS NOTES
[facility] [place] [facility]  [facility] EAR, NOSE AND THROAT  [address]   [address]  [facility] [address]  Dept: [phone]  Dept Fax: [phone]  Loc: [phone]    Alfonzo Briggs is a 44 y.o. male who was referred by No ref. provider found for:  Chief Complaint   Patient presents with    Follow-up     Patient is here for a f/u and CT Review. Patient had CT Facial Bones 11/14/23. Patient said that he still cannot breathe through his nose. Patient had audio on 10/25. Patient said that he thinks that there may be discussion of surgery.    .    HPI:     Alfonzo Briggs is a 44 y.o. male who presents today for discussion regarding surgical correction of his chronic nasal obstruction.  He has completed prolonged beta-lactamase resistant antibiotic treatment and obtained a CT scan of his sinuses.    Anterior septal deviation to the right:        Massive hypertrophy right inferior turbinate:        Large martín bullosa right middle turbinate with deviation of the nasal septum at that level to the left, and the residual mucosal disease in the maxillary sinuses prior right maxillary antrostomy        Manage below stools orbital contents protruding into the ethmoid space.  Possible prior medial blowout fracture.  Also note the absence of half of the inferior turbinates along their inferior surface.        Note that the blockage of his airway is bilateral.      History:     No Known Allergies  Current Outpatient Medications   Medication Sig Dispense Refill    fenofibrate (TRICOR) 145 MG tablet take 1 tablet by mouth once daily 30 tablet 2    ASPIRIN LOW DOSE 81 MG EC tablet take 1 tablet by mouth once daily 30 tablet 2    atorvastatin (LIPITOR) 20 MG tablet take 1 tablet by mouth at bedtime 30 tablet 2    meloxicam (MOBIC) 15 MG tablet take 1 tablet by mouth once daily 90 tablet 3    lisinopril (PRINIVIL;ZESTRIL) 20 MG tablet Take 1 tablet by mouth daily 90 tablet 3    metFORMIN

## 2024-03-01 ENCOUNTER — TELEPHONE (OUTPATIENT)
Dept: FAMILY MEDICINE CLINIC | Age: 44
End: 2024-03-01

## 2024-03-01 NOTE — TELEPHONE ENCOUNTER
Patient is being scheduled for a procedure with Dr Cazares and we are requesting clearance from Dr. Mcadams.    DOS: 05/17/2024  Procedure: septoplasty revision, partial submucosal resection of right inferior nasal turbinate, partial resection of martín bullosa of right middle nasal turbinate.  Meds to hold: Hold ASA, mobic, vitamin C x 1 week. Hold metformin and lisinopril x 2 days.    Patient has been given orders to complete CBC, CMP, CXR, EKG prior to pre op.  He has an upcoming appt already scheduled with Dr. Mcadams on 04/09    Thank you!

## 2024-03-22 ENCOUNTER — NURSE ONLY (OUTPATIENT)
Dept: LAB | Age: 44
End: 2024-03-22

## 2024-03-22 DIAGNOSIS — K21.9 GASTROESOPHAGEAL REFLUX DISEASE, UNSPECIFIED WHETHER ESOPHAGITIS PRESENT: ICD-10-CM

## 2024-03-22 DIAGNOSIS — E78.00 PURE HYPERCHOLESTEROLEMIA: ICD-10-CM

## 2024-03-22 DIAGNOSIS — E55.9 VITAMIN D DEFICIENCY: ICD-10-CM

## 2024-03-22 DIAGNOSIS — E11.9 CONTROLLED TYPE 2 DIABETES MELLITUS WITHOUT COMPLICATION, WITHOUT LONG-TERM CURRENT USE OF INSULIN (HCC): ICD-10-CM

## 2024-03-22 LAB
25(OH)D3 SERPL-MCNC: 28 NG/ML (ref 30–100)
ALBUMIN SERPL BCG-MCNC: 4.6 G/DL (ref 3.5–5.1)
ALP SERPL-CCNC: 64 U/L (ref 38–126)
ALT SERPL W/O P-5'-P-CCNC: 35 U/L (ref 11–66)
ANION GAP SERPL CALC-SCNC: 12 MEQ/L (ref 8–16)
AST SERPL-CCNC: 25 U/L (ref 5–40)
BASOPHILS ABSOLUTE: 0.1 THOU/MM3 (ref 0–0.1)
BASOPHILS NFR BLD AUTO: 0.6 %
BILIRUB SERPL-MCNC: 0.5 MG/DL (ref 0.3–1.2)
BUN SERPL-MCNC: 18 MG/DL (ref 7–22)
CALCIUM SERPL-MCNC: 9.4 MG/DL (ref 8.5–10.5)
CHLORIDE SERPL-SCNC: 104 MEQ/L (ref 98–111)
CHOLEST SERPL-MCNC: 120 MG/DL (ref 100–199)
CO2 SERPL-SCNC: 23 MEQ/L (ref 23–33)
CREAT SERPL-MCNC: 1.1 MG/DL (ref 0.4–1.2)
CREAT UR-MCNC: 253.4 MG/DL
DEPRECATED MEAN GLUCOSE BLD GHB EST-ACNC: 105 MG/DL (ref 70–126)
DEPRECATED RDW RBC AUTO: 44.5 FL (ref 35–45)
EOSINOPHIL NFR BLD AUTO: 2.2 %
EOSINOPHILS ABSOLUTE: 0.2 THOU/MM3 (ref 0–0.4)
ERYTHROCYTE [DISTWIDTH] IN BLOOD BY AUTOMATED COUNT: 13.1 % (ref 11.5–14.5)
GFR SERPL CREATININE-BSD FRML MDRD: > 60 ML/MIN/1.73M2
GLUCOSE SERPL-MCNC: 113 MG/DL (ref 70–108)
HBA1C MFR BLD HPLC: 5.5 % (ref 4.4–6.4)
HCT VFR BLD AUTO: 47.2 % (ref 42–52)
HDLC SERPL-MCNC: 34 MG/DL
HGB BLD-MCNC: 16.1 GM/DL (ref 14–18)
IMM GRANULOCYTES # BLD AUTO: 0.03 THOU/MM3 (ref 0–0.07)
IMM GRANULOCYTES NFR BLD AUTO: 0.3 %
LDLC SERPL CALC-MCNC: 66 MG/DL
LYMPHOCYTES ABSOLUTE: 2.4 THOU/MM3 (ref 1–4.8)
LYMPHOCYTES NFR BLD AUTO: 24.4 %
MCH RBC QN AUTO: 31.4 PG (ref 26–33)
MCHC RBC AUTO-ENTMCNC: 34.1 GM/DL (ref 32.2–35.5)
MCV RBC AUTO: 92 FL (ref 80–94)
MICROALBUMIN UR-MCNC: 4.66 MG/DL
MICROALBUMIN/CREAT RATIO PNL UR: 18 MG/G (ref 0–30)
MONOCYTES ABSOLUTE: 0.6 THOU/MM3 (ref 0.4–1.3)
MONOCYTES NFR BLD AUTO: 6.3 %
NEUTROPHILS NFR BLD AUTO: 66.2 %
NRBC BLD AUTO-RTO: 0 /100 WBC
PLATELET # BLD AUTO: 276 THOU/MM3 (ref 130–400)
PMV BLD AUTO: 10.2 FL (ref 9.4–12.4)
POTASSIUM SERPL-SCNC: 4.1 MEQ/L (ref 3.5–5.2)
PROT SERPL-MCNC: 7.2 G/DL (ref 6.1–8)
RBC # BLD AUTO: 5.13 MILL/MM3 (ref 4.7–6.1)
SEGMENTED NEUTROPHILS ABSOLUTE COUNT: 6.5 THOU/MM3 (ref 1.8–7.7)
SODIUM SERPL-SCNC: 139 MEQ/L (ref 135–145)
TRIGL SERPL-MCNC: 100 MG/DL (ref 0–199)
TSH SERPL DL<=0.005 MIU/L-ACNC: 1.94 UIU/ML (ref 0.4–4.2)
WBC # BLD AUTO: 9.8 THOU/MM3 (ref 4.8–10.8)

## 2024-03-23 RX ORDER — ASPIRIN 81 MG/1
81 TABLET, COATED ORAL DAILY
Qty: 30 TABLET | Refills: 2 | Status: SHIPPED | OUTPATIENT
Start: 2024-03-23

## 2024-03-23 RX ORDER — FENOFIBRATE 145 MG/1
TABLET, COATED ORAL
Qty: 30 TABLET | Refills: 2 | Status: SHIPPED | OUTPATIENT
Start: 2024-03-23

## 2024-03-23 RX ORDER — ATORVASTATIN CALCIUM 20 MG/1
TABLET, FILM COATED ORAL
Qty: 30 TABLET | Refills: 2 | Status: SHIPPED | OUTPATIENT
Start: 2024-03-23

## 2024-03-25 DIAGNOSIS — E11.9 CONTROLLED TYPE 2 DIABETES MELLITUS WITHOUT COMPLICATION, WITHOUT LONG-TERM CURRENT USE OF INSULIN (HCC): ICD-10-CM

## 2024-03-25 RX ORDER — METFORMIN HYDROCHLORIDE 500 MG/1
500 TABLET, EXTENDED RELEASE ORAL 2 TIMES DAILY WITH MEALS
Qty: 180 TABLET | Refills: 3 | Status: SHIPPED | OUTPATIENT
Start: 2024-03-25 | End: 2024-03-26

## 2024-03-26 ENCOUNTER — OFFICE VISIT (OUTPATIENT)
Dept: FAMILY MEDICINE CLINIC | Age: 44
End: 2024-03-26
Payer: COMMERCIAL

## 2024-03-26 VITALS
RESPIRATION RATE: 16 BRPM | HEART RATE: 80 BPM | SYSTOLIC BLOOD PRESSURE: 150 MMHG | BODY MASS INDEX: 38.41 KG/M2 | WEIGHT: 267.7 LBS | DIASTOLIC BLOOD PRESSURE: 106 MMHG

## 2024-03-26 DIAGNOSIS — E55.9 VITAMIN D DEFICIENCY: ICD-10-CM

## 2024-03-26 DIAGNOSIS — I10 HYPERTENSION, UNSPECIFIED TYPE: ICD-10-CM

## 2024-03-26 DIAGNOSIS — K21.9 GASTROESOPHAGEAL REFLUX DISEASE, UNSPECIFIED WHETHER ESOPHAGITIS PRESENT: ICD-10-CM

## 2024-03-26 DIAGNOSIS — E78.00 PURE HYPERCHOLESTEROLEMIA: ICD-10-CM

## 2024-03-26 DIAGNOSIS — E11.9 CONTROLLED TYPE 2 DIABETES MELLITUS WITHOUT COMPLICATION, WITHOUT LONG-TERM CURRENT USE OF INSULIN (HCC): Primary | ICD-10-CM

## 2024-03-26 DIAGNOSIS — E66.9 OBESITY (BMI 30-39.9): ICD-10-CM

## 2024-03-26 DIAGNOSIS — E78.1 HYPERTRIGLYCERIDEMIA: ICD-10-CM

## 2024-03-26 PROCEDURE — 3080F DIAST BP >= 90 MM HG: CPT | Performed by: FAMILY MEDICINE

## 2024-03-26 PROCEDURE — G8427 DOCREV CUR MEDS BY ELIG CLIN: HCPCS | Performed by: FAMILY MEDICINE

## 2024-03-26 PROCEDURE — G2211 COMPLEX E/M VISIT ADD ON: HCPCS | Performed by: FAMILY MEDICINE

## 2024-03-26 PROCEDURE — G8484 FLU IMMUNIZE NO ADMIN: HCPCS | Performed by: FAMILY MEDICINE

## 2024-03-26 PROCEDURE — 4004F PT TOBACCO SCREEN RCVD TLK: CPT | Performed by: FAMILY MEDICINE

## 2024-03-26 PROCEDURE — 3044F HG A1C LEVEL LT 7.0%: CPT | Performed by: FAMILY MEDICINE

## 2024-03-26 PROCEDURE — 2022F DILAT RTA XM EVC RTNOPTHY: CPT | Performed by: FAMILY MEDICINE

## 2024-03-26 PROCEDURE — G8417 CALC BMI ABV UP PARAM F/U: HCPCS | Performed by: FAMILY MEDICINE

## 2024-03-26 PROCEDURE — 3077F SYST BP >= 140 MM HG: CPT | Performed by: FAMILY MEDICINE

## 2024-03-26 PROCEDURE — 99214 OFFICE O/P EST MOD 30 MIN: CPT | Performed by: FAMILY MEDICINE

## 2024-03-26 RX ORDER — AMLODIPINE BESYLATE 5 MG/1
5 TABLET ORAL DAILY
Qty: 90 TABLET | Refills: 3 | Status: SHIPPED | OUTPATIENT
Start: 2024-03-26

## 2024-03-26 RX ORDER — METFORMIN HYDROCHLORIDE 500 MG/1
500 TABLET, EXTENDED RELEASE ORAL DAILY
Qty: 180 TABLET | Refills: 3
Start: 2024-03-26

## 2024-03-26 ASSESSMENT — PATIENT HEALTH QUESTIONNAIRE - PHQ9
2. FEELING DOWN, DEPRESSED OR HOPELESS: NOT AT ALL
1. LITTLE INTEREST OR PLEASURE IN DOING THINGS: NOT AT ALL
SUM OF ALL RESPONSES TO PHQ QUESTIONS 1-9: 0
SUM OF ALL RESPONSES TO PHQ QUESTIONS 1-9: 0
SUM OF ALL RESPONSES TO PHQ9 QUESTIONS 1 & 2: 0
SUM OF ALL RESPONSES TO PHQ QUESTIONS 1-9: 0
SUM OF ALL RESPONSES TO PHQ QUESTIONS 1-9: 0

## 2024-03-26 ASSESSMENT — ENCOUNTER SYMPTOMS
RESPIRATORY NEGATIVE: 1
GASTROINTESTINAL NEGATIVE: 1

## 2024-03-26 NOTE — PROGRESS NOTES
Alfonzo Briggs (:  1980) is a 44 y.o. male,Established patient, here for evaluation of the following chief complaint(s):  6 Month Follow-Up (DM) and Hypertension (BP has been elevated for the past couple months)          Subjective   SUBJECTIVE/OBJECTIVE:  HPI  Chief Complaint   Patient presents with    6 Month Follow-Up     DM    Hypertension     BP has been elevated for the past couple months     6 month eval.    Sugars doing well.  Lab Results   Component Value Date    LABA1C 5.5 2024    LABA1C 5.2 2023    LABA1C 5.8 04/10/2023     Lab Results   Component Value Date    MALBCR 18 2024    LDLCALC 66 2024    CREATININE 1.1 2024     BPs have been running high.    BP Readings from Last 3 Encounters:   24 (!) 150/106   24 (!) 168/92   24 136/74     Wt Readings from Last 3 Encounters:   24 121.4 kg (267 lb 11.2 oz)   24 126 kg (277 lb 12.8 oz)   24 123.7 kg (272 lb 11.2 oz)       Patient Active Problem List   Diagnosis    Tobacco abuse    Dyslipidemia    Nasal obstruction    Deviated nasal septum    Chronic maxillary sinusitis    Hypertrophy of inferior nasal turbinate    Excessive cerumen in both ear canals    Shoulder pain    Right shoulder tendonitis    Obstructive sleep apnea    Morbid obesity with BMI of 40.0-44.9, adult (HCC)    Restless sleeper    Claustrophobia    Daytime somnolence    Atypical chest pain    Anxiety    IFG (impaired fasting glucose)    Metabolic syndrome    Vitamin D deficiency    Nasal septal deviation    Niecy bullosa, bilateral middle turbinate    Acute pain of left knee    Chondromalacia of left knee    Osteophyte of left knee       Current Outpatient Medications   Medication Sig Dispense Refill    amLODIPine (NORVASC) 5 MG tablet Take 1 tablet by mouth daily 90 tablet 3    metFORMIN (GLUCOPHAGE-XR) 500 MG extended release tablet Take 1 tablet by mouth daily 180 tablet 3    atorvastatin (LIPITOR) 20 MG tablet take

## 2024-04-10 ENCOUNTER — TELEPHONE (OUTPATIENT)
Dept: ENT CLINIC | Age: 44
End: 2024-04-10

## 2024-04-10 NOTE — TELEPHONE ENCOUNTER
I called patient because he had cancelled his appt 4/9 w pcp and I wanted to be sure he was cleared for surgery in May. He stated he has lost his insurance and it will take a few months to get that fixed so he wants to cancel for now and will c/b to r/s prn.

## 2024-04-22 RX ORDER — MELOXICAM 15 MG/1
TABLET ORAL
Qty: 90 TABLET | Refills: 3 | Status: SHIPPED | OUTPATIENT
Start: 2024-04-22

## 2024-05-20 DIAGNOSIS — I10 HYPERTENSION, UNSPECIFIED TYPE: ICD-10-CM

## 2024-05-20 RX ORDER — LISINOPRIL 20 MG/1
20 TABLET ORAL DAILY
Qty: 90 TABLET | Refills: 3 | Status: SHIPPED | OUTPATIENT
Start: 2024-05-20

## 2024-07-29 DIAGNOSIS — E11.9 CONTROLLED TYPE 2 DIABETES MELLITUS WITHOUT COMPLICATION, WITHOUT LONG-TERM CURRENT USE OF INSULIN (HCC): ICD-10-CM

## 2024-07-29 DIAGNOSIS — E78.00 PURE HYPERCHOLESTEROLEMIA: ICD-10-CM

## 2024-07-29 DIAGNOSIS — I10 HYPERTENSION, UNSPECIFIED TYPE: ICD-10-CM

## 2024-07-29 RX ORDER — MELOXICAM 15 MG/1
15 TABLET ORAL DAILY
Qty: 90 TABLET | Refills: 1 | Status: SHIPPED | OUTPATIENT
Start: 2024-07-29

## 2024-07-29 RX ORDER — ATORVASTATIN CALCIUM 20 MG/1
20 TABLET, FILM COATED ORAL NIGHTLY
Qty: 90 TABLET | Refills: 1 | Status: SHIPPED | OUTPATIENT
Start: 2024-07-29

## 2024-07-29 RX ORDER — METFORMIN HYDROCHLORIDE 500 MG/1
500 TABLET, EXTENDED RELEASE ORAL DAILY
Qty: 180 TABLET | Refills: 1 | Status: SHIPPED | OUTPATIENT
Start: 2024-07-29

## 2024-07-29 RX ORDER — AMLODIPINE BESYLATE 5 MG/1
5 TABLET ORAL DAILY
Qty: 90 TABLET | Refills: 1 | Status: SHIPPED | OUTPATIENT
Start: 2024-07-29

## 2024-07-29 RX ORDER — LISINOPRIL 20 MG/1
20 TABLET ORAL DAILY
Qty: 90 TABLET | Refills: 1 | Status: SHIPPED | OUTPATIENT
Start: 2024-07-29

## 2024-07-29 RX ORDER — ASPIRIN 81 MG/1
81 TABLET ORAL DAILY
Qty: 90 TABLET | Refills: 1 | Status: SHIPPED | OUTPATIENT
Start: 2024-07-29

## 2024-07-29 RX ORDER — FENOFIBRATE 145 MG/1
145 TABLET, COATED ORAL DAILY
Qty: 90 TABLET | Refills: 1 | Status: SHIPPED | OUTPATIENT
Start: 2024-07-29

## 2024-07-29 NOTE — TELEPHONE ENCOUNTER
The patient called in and stated that he needs all his scripts sent to [facility] since [facility] is closing.  Orders pended for your signature.      If no call back the patient will check with his pharmacy this evening.

## 2024-09-24 ENCOUNTER — LAB (OUTPATIENT)
Dept: LAB | Age: 44
End: 2024-09-24

## 2024-09-24 ENCOUNTER — OFFICE VISIT (OUTPATIENT)
Dept: FAMILY MEDICINE CLINIC | Age: 44
End: 2024-09-24

## 2024-09-24 VITALS
HEIGHT: 70 IN | SYSTOLIC BLOOD PRESSURE: 122 MMHG | WEIGHT: 269.8 LBS | DIASTOLIC BLOOD PRESSURE: 70 MMHG | BODY MASS INDEX: 38.63 KG/M2 | HEART RATE: 76 BPM | RESPIRATION RATE: 16 BRPM

## 2024-09-24 DIAGNOSIS — K21.9 GASTROESOPHAGEAL REFLUX DISEASE, UNSPECIFIED WHETHER ESOPHAGITIS PRESENT: ICD-10-CM

## 2024-09-24 DIAGNOSIS — I10 HYPERTENSION, UNSPECIFIED TYPE: ICD-10-CM

## 2024-09-24 DIAGNOSIS — E66.9 OBESITY (BMI 30-39.9): ICD-10-CM

## 2024-09-24 DIAGNOSIS — E11.9 CONTROLLED TYPE 2 DIABETES MELLITUS WITHOUT COMPLICATION, WITHOUT LONG-TERM CURRENT USE OF INSULIN (HCC): Primary | ICD-10-CM

## 2024-09-24 DIAGNOSIS — E55.9 VITAMIN D DEFICIENCY: ICD-10-CM

## 2024-09-24 DIAGNOSIS — E11.9 CONTROLLED TYPE 2 DIABETES MELLITUS WITHOUT COMPLICATION, WITHOUT LONG-TERM CURRENT USE OF INSULIN (HCC): ICD-10-CM

## 2024-09-24 DIAGNOSIS — E78.1 HYPERTRIGLYCERIDEMIA: ICD-10-CM

## 2024-09-24 DIAGNOSIS — E78.00 PURE HYPERCHOLESTEROLEMIA: ICD-10-CM

## 2024-09-24 LAB
DEPRECATED MEAN GLUCOSE BLD GHB EST-ACNC: 123 MG/DL (ref 70–126)
HBA1C MFR BLD HPLC: 6.1 % (ref 4.4–6.4)

## 2024-09-24 PROCEDURE — 3044F HG A1C LEVEL LT 7.0%: CPT | Performed by: FAMILY MEDICINE

## 2024-09-24 PROCEDURE — 3074F SYST BP LT 130 MM HG: CPT | Performed by: FAMILY MEDICINE

## 2024-09-24 PROCEDURE — 99213 OFFICE O/P EST LOW 20 MIN: CPT | Performed by: FAMILY MEDICINE

## 2024-09-24 PROCEDURE — 3078F DIAST BP <80 MM HG: CPT | Performed by: FAMILY MEDICINE

## 2024-09-24 SDOH — ECONOMIC STABILITY: INCOME INSECURITY: HOW HARD IS IT FOR YOU TO PAY FOR THE VERY BASICS LIKE FOOD, HOUSING, MEDICAL CARE, AND HEATING?: NOT HARD AT ALL

## 2024-09-24 SDOH — ECONOMIC STABILITY: FOOD INSECURITY: WITHIN THE PAST 12 MONTHS, THE FOOD YOU BOUGHT JUST DIDN'T LAST AND YOU DIDN'T HAVE MONEY TO GET MORE.: NEVER TRUE

## 2024-09-24 SDOH — ECONOMIC STABILITY: FOOD INSECURITY: WITHIN THE PAST 12 MONTHS, YOU WORRIED THAT YOUR FOOD WOULD RUN OUT BEFORE YOU GOT MONEY TO BUY MORE.: NEVER TRUE

## 2024-09-24 ASSESSMENT — ENCOUNTER SYMPTOMS
RESPIRATORY NEGATIVE: 1
GASTROINTESTINAL NEGATIVE: 1

## 2025-02-02 DIAGNOSIS — E78.00 PURE HYPERCHOLESTEROLEMIA: ICD-10-CM

## 2025-02-02 DIAGNOSIS — I10 HYPERTENSION, UNSPECIFIED TYPE: ICD-10-CM

## 2025-02-03 RX ORDER — FENOFIBRATE 145 MG/1
145 TABLET, COATED ORAL DAILY
Qty: 90 TABLET | Refills: 1 | Status: SHIPPED | OUTPATIENT
Start: 2025-02-03

## 2025-02-03 RX ORDER — AMLODIPINE BESYLATE 5 MG/1
5 TABLET ORAL DAILY
Qty: 90 TABLET | Refills: 1 | Status: SHIPPED | OUTPATIENT
Start: 2025-02-03

## 2025-02-03 RX ORDER — LISINOPRIL 20 MG/1
20 TABLET ORAL DAILY
Qty: 90 TABLET | Refills: 1 | Status: SHIPPED | OUTPATIENT
Start: 2025-02-03

## 2025-02-03 RX ORDER — MELOXICAM 15 MG/1
15 TABLET ORAL DAILY
Qty: 90 TABLET | Refills: 1 | Status: SHIPPED | OUTPATIENT
Start: 2025-02-03

## 2025-02-03 RX ORDER — ATORVASTATIN CALCIUM 20 MG/1
20 TABLET, FILM COATED ORAL NIGHTLY
Qty: 90 TABLET | Refills: 1 | Status: SHIPPED | OUTPATIENT
Start: 2025-02-03

## 2025-03-17 ENCOUNTER — LAB (OUTPATIENT)
Dept: LAB | Age: 45
End: 2025-03-17

## 2025-03-17 ENCOUNTER — TELEPHONE (OUTPATIENT)
Dept: FAMILY MEDICINE CLINIC | Age: 45
End: 2025-03-17

## 2025-03-17 DIAGNOSIS — E78.00 PURE HYPERCHOLESTEROLEMIA: ICD-10-CM

## 2025-03-17 DIAGNOSIS — I10 HYPERTENSION, UNSPECIFIED TYPE: Primary | ICD-10-CM

## 2025-03-17 DIAGNOSIS — E11.9 CONTROLLED TYPE 2 DIABETES MELLITUS WITHOUT COMPLICATION, WITHOUT LONG-TERM CURRENT USE OF INSULIN: ICD-10-CM

## 2025-03-17 DIAGNOSIS — E78.1 HYPERTRIGLYCERIDEMIA: ICD-10-CM

## 2025-03-17 DIAGNOSIS — K21.9 GASTROESOPHAGEAL REFLUX DISEASE, UNSPECIFIED WHETHER ESOPHAGITIS PRESENT: ICD-10-CM

## 2025-03-17 DIAGNOSIS — E55.9 VITAMIN D DEFICIENCY: ICD-10-CM

## 2025-03-17 LAB
25(OH)D3 SERPL-MCNC: 29 NG/ML (ref 30–100)
ALBUMIN SERPL BCG-MCNC: 4.5 G/DL (ref 3.4–4.9)
ALP SERPL-CCNC: 80 U/L (ref 40–129)
ALT SERPL W/O P-5'-P-CCNC: 51 U/L (ref 10–50)
ANION GAP SERPL CALC-SCNC: 13 MEQ/L (ref 8–16)
AST SERPL-CCNC: 26 U/L (ref 10–50)
BASOPHILS ABSOLUTE: 0.1 THOU/MM3 (ref 0–0.1)
BASOPHILS NFR BLD AUTO: 0.7 %
BILIRUB SERPL-MCNC: 0.6 MG/DL (ref 0.3–1.2)
BUN SERPL-MCNC: 15 MG/DL (ref 8–23)
CALCIUM SERPL-MCNC: 9.7 MG/DL (ref 8.6–10)
CHLORIDE SERPL-SCNC: 103 MEQ/L (ref 98–111)
CHOLEST SERPL-MCNC: 199 MG/DL (ref 100–199)
CO2 SERPL-SCNC: 23 MEQ/L (ref 22–29)
CREAT SERPL-MCNC: 1.2 MG/DL (ref 0.7–1.2)
CREAT UR-MCNC: 255 MG/DL
DEPRECATED MEAN GLUCOSE BLD GHB EST-ACNC: 135 MG/DL (ref 70–126)
DEPRECATED RDW RBC AUTO: 42.1 FL (ref 35–45)
EOSINOPHIL NFR BLD AUTO: 2.1 %
EOSINOPHILS ABSOLUTE: 0.2 THOU/MM3 (ref 0–0.4)
ERYTHROCYTE [DISTWIDTH] IN BLOOD BY AUTOMATED COUNT: 13 % (ref 11.5–14.5)
GFR SERPL CREATININE-BSD FRML MDRD: 75 ML/MIN/1.73M2
GLUCOSE SERPL-MCNC: 124 MG/DL (ref 74–109)
HBA1C MFR BLD HPLC: 6.5 % (ref 4–6)
HCT VFR BLD AUTO: 44.8 % (ref 42–52)
HDLC SERPL-MCNC: 31 MG/DL
HGB BLD-MCNC: 15.6 GM/DL (ref 14–18)
IMM GRANULOCYTES # BLD AUTO: 0.04 THOU/MM3 (ref 0–0.07)
IMM GRANULOCYTES NFR BLD AUTO: 0.5 %
LDLC SERPL CALC-MCNC: 126 MG/DL
LYMPHOCYTES ABSOLUTE: 2 THOU/MM3 (ref 1–4.8)
LYMPHOCYTES NFR BLD AUTO: 23.2 %
MCH RBC QN AUTO: 31.2 PG (ref 26–33)
MCHC RBC AUTO-ENTMCNC: 34.8 GM/DL (ref 32.2–35.5)
MCV RBC AUTO: 89.6 FL (ref 80–94)
MICROALBUMIN UR-MCNC: 9.08 MG/DL
MICROALBUMIN/CREAT RATIO PNL UR: 36 MG/G (ref 0–30)
MONOCYTES ABSOLUTE: 0.5 THOU/MM3 (ref 0.4–1.3)
MONOCYTES NFR BLD AUTO: 6.2 %
NEUTROPHILS ABSOLUTE: 5.7 THOU/MM3 (ref 1.8–7.7)
NEUTROPHILS NFR BLD AUTO: 67.3 %
NRBC BLD AUTO-RTO: 0 /100 WBC
PLATELET # BLD AUTO: 271 THOU/MM3 (ref 130–400)
PMV BLD AUTO: 10.1 FL (ref 9.4–12.4)
POTASSIUM SERPL-SCNC: 4 MEQ/L (ref 3.5–5.2)
PROT SERPL-MCNC: 7.5 G/DL (ref 6.4–8.3)
RBC # BLD AUTO: 5 MILL/MM3 (ref 4.7–6.1)
SODIUM SERPL-SCNC: 139 MEQ/L (ref 135–145)
TRIGL SERPL-MCNC: 212 MG/DL (ref 0–199)
TSH SERPL DL<=0.05 MIU/L-ACNC: 2.1 UIU/ML (ref 0.27–4.2)
WBC # BLD AUTO: 8.5 THOU/MM3 (ref 4.8–10.8)

## 2025-03-17 NOTE — TELEPHONE ENCOUNTER
[facility] at [facility]. They stated orders are . Patient would like to have labs completed prior to appt tomorrow. Please advise.

## 2025-03-18 ENCOUNTER — OFFICE VISIT (OUTPATIENT)
Dept: FAMILY MEDICINE CLINIC | Age: 45
End: 2025-03-18

## 2025-03-18 VITALS
RESPIRATION RATE: 18 BRPM | DIASTOLIC BLOOD PRESSURE: 74 MMHG | WEIGHT: 271.4 LBS | SYSTOLIC BLOOD PRESSURE: 122 MMHG | BODY MASS INDEX: 38.85 KG/M2 | HEART RATE: 74 BPM | HEIGHT: 70 IN

## 2025-03-18 DIAGNOSIS — I10 HYPERTENSION, UNSPECIFIED TYPE: ICD-10-CM

## 2025-03-18 DIAGNOSIS — E78.00 PURE HYPERCHOLESTEROLEMIA: ICD-10-CM

## 2025-03-18 DIAGNOSIS — E78.1 HYPERTRIGLYCERIDEMIA: ICD-10-CM

## 2025-03-18 DIAGNOSIS — E55.9 VITAMIN D DEFICIENCY: ICD-10-CM

## 2025-03-18 DIAGNOSIS — Z12.11 SCREEN FOR COLON CANCER: ICD-10-CM

## 2025-03-18 DIAGNOSIS — E66.9 OBESITY (BMI 30-39.9): ICD-10-CM

## 2025-03-18 DIAGNOSIS — E11.9 CONTROLLED TYPE 2 DIABETES MELLITUS WITHOUT COMPLICATION, WITHOUT LONG-TERM CURRENT USE OF INSULIN: Primary | ICD-10-CM

## 2025-03-18 DIAGNOSIS — K21.9 GASTROESOPHAGEAL REFLUX DISEASE, UNSPECIFIED WHETHER ESOPHAGITIS PRESENT: ICD-10-CM

## 2025-03-18 PROCEDURE — G2211 COMPLEX E/M VISIT ADD ON: HCPCS | Performed by: FAMILY MEDICINE

## 2025-03-18 PROCEDURE — 3044F HG A1C LEVEL LT 7.0%: CPT | Performed by: FAMILY MEDICINE

## 2025-03-18 PROCEDURE — 3078F DIAST BP <80 MM HG: CPT | Performed by: FAMILY MEDICINE

## 2025-03-18 PROCEDURE — 99214 OFFICE O/P EST MOD 30 MIN: CPT | Performed by: FAMILY MEDICINE

## 2025-03-18 PROCEDURE — 3074F SYST BP LT 130 MM HG: CPT | Performed by: FAMILY MEDICINE

## 2025-03-18 SDOH — ECONOMIC STABILITY: FOOD INSECURITY: WITHIN THE PAST 12 MONTHS, YOU WORRIED THAT YOUR FOOD WOULD RUN OUT BEFORE YOU GOT MONEY TO BUY MORE.: NEVER TRUE

## 2025-03-18 SDOH — ECONOMIC STABILITY: FOOD INSECURITY: WITHIN THE PAST 12 MONTHS, THE FOOD YOU BOUGHT JUST DIDN'T LAST AND YOU DIDN'T HAVE MONEY TO GET MORE.: NEVER TRUE

## 2025-03-18 ASSESSMENT — PATIENT HEALTH QUESTIONNAIRE - PHQ9
SUM OF ALL RESPONSES TO PHQ QUESTIONS 1-9: 0
1. LITTLE INTEREST OR PLEASURE IN DOING THINGS: NOT AT ALL
SUM OF ALL RESPONSES TO PHQ QUESTIONS 1-9: 0
SUM OF ALL RESPONSES TO PHQ QUESTIONS 1-9: 0
2. FEELING DOWN, DEPRESSED OR HOPELESS: NOT AT ALL
SUM OF ALL RESPONSES TO PHQ QUESTIONS 1-9: 0

## 2025-03-18 ASSESSMENT — ENCOUNTER SYMPTOMS
GASTROINTESTINAL NEGATIVE: 1
RESPIRATORY NEGATIVE: 1

## 2025-03-18 NOTE — PROGRESS NOTES
Alfonzo Briggs (:  1980) is a 45 y.o. male,Established patient, here for evaluation of the following chief complaint(s):  6 Month Follow-Up, Diabetes, and Health Maintenance (Due for Colonoscopy )          Subjective   SUBJECTIVE/OBJECTIVE:  HPI  Chief Complaint   Patient presents with    6 Month Follow-Up    Diabetes    Health Maintenance     Due for Colonoscopy      6 month eval.    A1C jumped.  Pt stopped taking all of his meds.  He has changed his diet and wanted to see how his numbers would look.  Lab Results   Component Value Date    LABA1C 6.5 (H) 2025    LABA1C 6.1 2024    LABA1C 5.5 2024     Lab Results   Component Value Date    MALBCR 18 2024    CREATININE 1.2 2025     BP Readings from Last 3 Encounters:   25 122/74   24 122/70   24 (!) 150/106     Wt Readings from Last 3 Encounters:   25 123.1 kg (271 lb 6.4 oz)   24 122.4 kg (269 lb 12.8 oz)   24 121.4 kg (267 lb 11.2 oz)       Patient Active Problem List   Diagnosis    Tobacco abuse    Dyslipidemia    Nasal obstruction    Deviated nasal septum    Chronic maxillary sinusitis    Hypertrophy of inferior nasal turbinate    Excessive cerumen in both ear canals    Shoulder pain    Right shoulder tendonitis    Obstructive sleep apnea    Morbid obesity with BMI of 40.0-44.9, adult    Restless sleeper    Claustrophobia    Daytime somnolence    Atypical chest pain    Anxiety    IFG (impaired fasting glucose)    Metabolic syndrome    Vitamin D deficiency    Nasal septal deviation    Niecy bullosa, bilateral middle turbinate    Acute pain of left knee    Chondromalacia of left knee    Osteophyte of left knee       Current Outpatient Medications   Medication Sig Dispense Refill    lisinopril (PRINIVIL;ZESTRIL) 20 MG tablet TAKE 1 TABLET BY MOUTH DAILY 90 tablet 1    meloxicam (MOBIC) 15 MG tablet TAKE 1 TABLET BY MOUTH DAILY 90 tablet 1    fenofibrate (TRICOR) 145 MG tablet TAKE 1 TABLET

## 2025-04-17 ENCOUNTER — RESULTS FOLLOW-UP (OUTPATIENT)
Dept: FAMILY MEDICINE CLINIC | Age: 45
End: 2025-04-17

## 2025-04-17 LAB — NONINV COLON CA DNA+OCC BLD SCRN STL QL: NEGATIVE

## 2025-07-31 ENCOUNTER — HOSPITAL ENCOUNTER (EMERGENCY)
Age: 45
Discharge: HOME | End: 2025-07-31

## 2025-07-31 VITALS
OXYGEN SATURATION: 97 % | WEIGHT: 275 LBS | DIASTOLIC BLOOD PRESSURE: 96 MMHG | HEART RATE: 77 BPM | BODY MASS INDEX: 39.46 KG/M2 | RESPIRATION RATE: 16 BRPM | SYSTOLIC BLOOD PRESSURE: 144 MMHG | TEMPERATURE: 98.1 F

## 2025-07-31 DIAGNOSIS — H66.002 NON-RECURRENT ACUTE SUPPURATIVE OTITIS MEDIA OF LEFT EAR WITHOUT SPONTANEOUS RUPTURE OF TYMPANIC MEMBRANE: ICD-10-CM

## 2025-07-31 DIAGNOSIS — H60.392 INFECTIVE OTITIS EXTERNA OF LEFT EAR: Primary | ICD-10-CM

## 2025-07-31 PROCEDURE — 99213 OFFICE O/P EST LOW 20 MIN: CPT

## 2025-07-31 RX ORDER — OFLOXACIN 3 MG/ML
5 SOLUTION AURICULAR (OTIC) 2 TIMES DAILY
Qty: 5 ML | Refills: 0 | Status: SHIPPED | OUTPATIENT
Start: 2025-07-31 | End: 2025-08-10

## 2025-07-31 RX ORDER — CETIRIZINE HYDROCHLORIDE 10 MG/1
10 TABLET ORAL DAILY
COMMUNITY

## 2025-07-31 RX ORDER — OFLOXACIN 3 MG/ML
5 SOLUTION AURICULAR (OTIC) 2 TIMES DAILY
Qty: 5 ML | Refills: 0 | Status: SHIPPED | OUTPATIENT
Start: 2025-07-31 | End: 2025-07-31

## 2025-07-31 ASSESSMENT — PAIN DESCRIPTION - ORIENTATION: ORIENTATION: LEFT

## 2025-07-31 ASSESSMENT — PAIN DESCRIPTION - LOCATION: LOCATION: EAR

## 2025-07-31 ASSESSMENT — PAIN - FUNCTIONAL ASSESSMENT
PAIN_FUNCTIONAL_ASSESSMENT: ACTIVITIES ARE NOT PREVENTED
PAIN_FUNCTIONAL_ASSESSMENT: 0-10

## 2025-07-31 ASSESSMENT — ENCOUNTER SYMPTOMS
SORE THROAT: 0
SHORTNESS OF BREATH: 0
RHINORRHEA: 1
SINUS PRESSURE: 0

## 2025-07-31 ASSESSMENT — PAIN DESCRIPTION - FREQUENCY: FREQUENCY: CONTINUOUS

## 2025-07-31 ASSESSMENT — PAIN SCALES - GENERAL: PAINLEVEL_OUTOF10: 5

## 2025-07-31 NOTE — ED PROVIDER NOTES
[facility] URGENT CARE  Urgent Care Encounter       CHIEF COMPLAINT       Chief Complaint   Patient presents with    Ear Pain       Nurses Notes reviewed and I agree except as noted in the HPI.  HISTORY OF PRESENT ILLNESS   Alfonzo Briggs is a 45 y.o. male who presents with left ear pain. Patient reports left ear pain, facial pain, and muffled hearing starting 3 days ago. Denies fevers, chills, sinus symptoms. Patient reports trying to use his fiance's ear drops to help with the pain but did not get any relief. Denies drainage from ear. Rates pain 5/10.     The history is provided by the patient.       REVIEW OF SYSTEMS     Review of Systems   Constitutional:  Negative for chills and fever.   HENT:  Positive for ear pain, hearing loss and rhinorrhea. Negative for congestion, ear discharge, sinus pressure and sore throat.    Respiratory:  Negative for shortness of breath.    Cardiovascular:  Negative for chest pain.   Neurological:  Negative for headaches.   Hematological:  Negative for adenopathy.       PAST MEDICAL HISTORY         Diagnosis Date    Anxiety     Asthma     as child    Depression     Hyperlipidemia     Metabolic syndrome 10/19/2016    Obesity     Sleep apnea     Has CPAP    Type 2 diabetes mellitus without complication (HCC)        SURGICALHISTORY     Patient  has a past surgical history that includes Appendectomy (1996); sinus surgery (07/16/2014); and Knee arthroscopy (01/2018).    CURRENT MEDICATIONS       Current Discharge Medication List        CONTINUE these medications which have NOT CHANGED    Details   cetirizine (ZYRTEC) 10 MG tablet Take 1 tablet by mouth daily      lisinopril (PRINIVIL;ZESTRIL) 20 MG tablet TAKE 1 TABLET BY MOUTH DAILY  Qty: 90 tablet, Refills: 1    Associated Diagnoses: Hypertension, unspecified type      meloxicam (MOBIC) 15 MG tablet TAKE 1 TABLET BY MOUTH DAILY  Qty: 90 tablet, Refills: 1      fenofibrate (TRICOR) 145 MG tablet TAKE 1 TABLET BY MOUTH

## 2025-07-31 NOTE — DISCHARGE INSTRUCTIONS
Antibiotics  Tylenol and motrin  Avoid water in ears  Follow up with PCP as needed    Meds should hopefully be less than $40.   If too expensive call us back and we can try to find a different option

## 2025-07-31 NOTE — ED TRIAGE NOTES
Alfonzo arrives to room with complaint of left ear pain, feels swollen with swelling into left cheek symptoms started 2 days ago.       Using old ear drops that he had at home.  Unsure if they are OTC or script.

## 2025-08-03 DIAGNOSIS — I10 HYPERTENSION, UNSPECIFIED TYPE: ICD-10-CM

## 2025-08-03 DIAGNOSIS — E11.9 CONTROLLED TYPE 2 DIABETES MELLITUS WITHOUT COMPLICATION, WITHOUT LONG-TERM CURRENT USE OF INSULIN (HCC): ICD-10-CM

## 2025-08-04 RX ORDER — METFORMIN HYDROCHLORIDE 500 MG/1
500 TABLET, EXTENDED RELEASE ORAL DAILY
Qty: 180 TABLET | Refills: 1 | Status: SHIPPED | OUTPATIENT
Start: 2025-08-04

## 2025-08-04 RX ORDER — LISINOPRIL 20 MG/1
20 TABLET ORAL DAILY
Qty: 90 TABLET | Refills: 1 | Status: SHIPPED | OUTPATIENT
Start: 2025-08-04

## 2025-08-04 RX ORDER — FENOFIBRATE 145 MG/1
145 TABLET, FILM COATED ORAL DAILY
Qty: 90 TABLET | Refills: 1 | Status: SHIPPED | OUTPATIENT
Start: 2025-08-04

## 2025-08-08 RX ORDER — ACETAMINOPHEN 160 MG
2000 TABLET,DISINTEGRATING ORAL DAILY
Qty: 90 CAPSULE | Refills: 1 | Status: SHIPPED | OUTPATIENT
Start: 2025-08-08

## 2025-08-08 RX ORDER — ASPIRIN 81 MG/1
81 TABLET, COATED ORAL DAILY
Qty: 90 TABLET | Refills: 1 | Status: SHIPPED | OUTPATIENT
Start: 2025-08-08